# Patient Record
Sex: FEMALE | Race: OTHER | HISPANIC OR LATINO | Employment: FULL TIME | URBAN - METROPOLITAN AREA
[De-identification: names, ages, dates, MRNs, and addresses within clinical notes are randomized per-mention and may not be internally consistent; named-entity substitution may affect disease eponyms.]

---

## 2017-01-06 ENCOUNTER — ALLSCRIPTS OFFICE VISIT (OUTPATIENT)
Dept: OTHER | Facility: OTHER | Age: 42
End: 2017-01-06

## 2017-01-06 ENCOUNTER — TRANSCRIBE ORDERS (OUTPATIENT)
Dept: ADMINISTRATIVE | Facility: HOSPITAL | Age: 42
End: 2017-01-06

## 2017-01-06 DIAGNOSIS — R31.29 OTHER MICROSCOPIC HEMATURIA: ICD-10-CM

## 2017-01-06 DIAGNOSIS — E11.29 TYPE 2 DIABETES MELLITUS WITH OTHER DIABETIC KIDNEY COMPLICATION (CODE): ICD-10-CM

## 2017-01-06 DIAGNOSIS — R31.29 MICROSCOPIC HEMATURIA: Primary | ICD-10-CM

## 2017-01-06 DIAGNOSIS — E03.9 HYPOTHYROIDISM: ICD-10-CM

## 2017-01-06 DIAGNOSIS — R77.8 OTHER SPECIFIED ABNORMALITIES OF PLASMA PROTEINS: ICD-10-CM

## 2017-01-06 DIAGNOSIS — R74.8 ABNORMAL LEVELS OF OTHER SERUM ENZYMES: ICD-10-CM

## 2017-01-06 LAB
BILIRUB UR QL STRIP: NORMAL
CLARITY UR: NORMAL
COLOR UR: YELLOW
GLUCOSE (HISTORICAL): NORMAL
HGB UR QL STRIP.AUTO: NORMAL
KETONES UR STRIP-MCNC: NORMAL MG/DL
LEUKOCYTE ESTERASE UR QL STRIP: NORMAL
NITRITE UR QL STRIP: NORMAL
PH UR STRIP.AUTO: 6 [PH]
PROT UR STRIP-MCNC: NORMAL MG/DL
SP GR UR STRIP.AUTO: 1.01

## 2017-01-09 ENCOUNTER — APPOINTMENT (OUTPATIENT)
Dept: LAB | Facility: HOSPITAL | Age: 42
End: 2017-01-09
Attending: UROLOGY
Payer: COMMERCIAL

## 2017-01-09 DIAGNOSIS — R31.29 OTHER MICROSCOPIC HEMATURIA: ICD-10-CM

## 2017-01-09 PROCEDURE — 87086 URINE CULTURE/COLONY COUNT: CPT

## 2017-01-10 LAB — BACTERIA UR CULT: NORMAL

## 2017-01-12 ENCOUNTER — APPOINTMENT (OUTPATIENT)
Dept: LAB | Facility: CLINIC | Age: 42
End: 2017-01-12
Payer: COMMERCIAL

## 2017-01-12 ENCOUNTER — TRANSCRIBE ORDERS (OUTPATIENT)
Dept: LAB | Facility: CLINIC | Age: 42
End: 2017-01-12

## 2017-01-12 DIAGNOSIS — E66.09 OTHER OBESITY DUE TO EXCESS CALORIES: ICD-10-CM

## 2017-01-12 DIAGNOSIS — Z79.4 ENCOUNTER FOR LONG-TERM (CURRENT) USE OF INSULIN (HCC): ICD-10-CM

## 2017-01-12 DIAGNOSIS — E78.2 MIXED HYPERLIPIDEMIA: ICD-10-CM

## 2017-01-12 DIAGNOSIS — E55.9 UNSPECIFIED VITAMIN D DEFICIENCY: ICD-10-CM

## 2017-01-12 DIAGNOSIS — E03.9 UNSPECIFIED HYPOTHYROIDISM: ICD-10-CM

## 2017-01-12 DIAGNOSIS — E11.649 DIABETIC HYPOGLYCEMIA (HCC): ICD-10-CM

## 2017-01-12 DIAGNOSIS — E11.649 DIABETIC HYPOGLYCEMIA (HCC): Primary | ICD-10-CM

## 2017-01-12 LAB
25(OH)D3 SERPL-MCNC: 29.2 NG/ML (ref 30–100)
ALBUMIN SERPL BCP-MCNC: 3.9 G/DL (ref 3.5–5)
ALP SERPL-CCNC: 120 U/L (ref 46–116)
ALT SERPL W P-5'-P-CCNC: 42 U/L (ref 12–78)
ANION GAP SERPL CALCULATED.3IONS-SCNC: 7 MMOL/L (ref 4–13)
AST SERPL W P-5'-P-CCNC: 11 U/L (ref 5–45)
BILIRUB SERPL-MCNC: 0.44 MG/DL (ref 0.2–1)
BUN SERPL-MCNC: 13 MG/DL (ref 5–25)
CALCIUM SERPL-MCNC: 9.9 MG/DL (ref 8.3–10.1)
CHLORIDE SERPL-SCNC: 101 MMOL/L (ref 100–108)
CO2 SERPL-SCNC: 27 MMOL/L (ref 21–32)
CREAT SERPL-MCNC: 0.62 MG/DL (ref 0.6–1.3)
EST. AVERAGE GLUCOSE BLD GHB EST-MCNC: 120 MG/DL
GFR SERPL CREATININE-BSD FRML MDRD: >60 ML/MIN/1.73SQ M
GLUCOSE SERPL-MCNC: 107 MG/DL (ref 65–140)
HBA1C MFR BLD: 5.8 % (ref 4.2–6.3)
POTASSIUM SERPL-SCNC: 4 MMOL/L (ref 3.5–5.3)
PROT SERPL-MCNC: 8.1 G/DL (ref 6.4–8.2)
SODIUM SERPL-SCNC: 135 MMOL/L (ref 136–145)

## 2017-01-12 PROCEDURE — 82306 VITAMIN D 25 HYDROXY: CPT

## 2017-01-12 PROCEDURE — 83036 HEMOGLOBIN GLYCOSYLATED A1C: CPT

## 2017-01-12 PROCEDURE — 36415 COLL VENOUS BLD VENIPUNCTURE: CPT

## 2017-01-12 PROCEDURE — 80053 COMPREHEN METABOLIC PANEL: CPT

## 2017-01-17 ENCOUNTER — GENERIC CONVERSION - ENCOUNTER (OUTPATIENT)
Dept: OTHER | Facility: OTHER | Age: 42
End: 2017-01-17

## 2017-01-24 ENCOUNTER — ALLSCRIPTS OFFICE VISIT (OUTPATIENT)
Dept: OTHER | Facility: OTHER | Age: 42
End: 2017-01-24

## 2017-01-27 ENCOUNTER — ALLSCRIPTS OFFICE VISIT (OUTPATIENT)
Dept: OTHER | Facility: OTHER | Age: 42
End: 2017-01-27

## 2017-02-23 ENCOUNTER — HOSPITAL ENCOUNTER (OUTPATIENT)
Dept: CT IMAGING | Facility: CLINIC | Age: 42
Discharge: HOME | End: 2017-02-23
Payer: COMMERCIAL

## 2017-02-23 DIAGNOSIS — R31.29 OTHER MICROSCOPIC HEMATURIA: ICD-10-CM

## 2017-02-23 DIAGNOSIS — R31.29 MICROSCOPIC HEMATURIA: ICD-10-CM

## 2017-02-23 PROCEDURE — 74178 CT ABD&PLV WO CNTR FLWD CNTR: CPT

## 2017-02-23 RX ADMIN — IOHEXOL 120 ML: 350 INJECTION, SOLUTION INTRAVENOUS at 10:43

## 2017-03-09 ENCOUNTER — APPOINTMENT (OUTPATIENT)
Dept: LAB | Facility: HOSPITAL | Age: 42
End: 2017-03-09
Attending: UROLOGY
Payer: COMMERCIAL

## 2017-03-09 ENCOUNTER — ALLSCRIPTS OFFICE VISIT (OUTPATIENT)
Dept: OTHER | Facility: OTHER | Age: 42
End: 2017-03-09

## 2017-03-09 PROCEDURE — 88112 CYTOPATH CELL ENHANCE TECH: CPT

## 2017-03-09 PROCEDURE — 87086 URINE CULTURE/COLONY COUNT: CPT | Performed by: UROLOGY

## 2017-03-10 ENCOUNTER — LAB REQUISITION (OUTPATIENT)
Dept: LAB | Facility: HOSPITAL | Age: 42
End: 2017-03-10
Payer: COMMERCIAL

## 2017-03-10 ENCOUNTER — TRANSCRIBE ORDERS (OUTPATIENT)
Dept: LAB | Facility: HOSPITAL | Age: 42
End: 2017-03-10

## 2017-03-10 DIAGNOSIS — R31.9 HEMATURIA SYNDROME: Primary | ICD-10-CM

## 2017-03-10 DIAGNOSIS — R31.9 HEMATURIA: ICD-10-CM

## 2017-03-10 DIAGNOSIS — N32.81 OVERACTIVE BLADDER: ICD-10-CM

## 2017-03-11 LAB — BACTERIA UR CULT: NORMAL

## 2017-03-16 ENCOUNTER — APPOINTMENT (OUTPATIENT)
Dept: LAB | Facility: HOSPITAL | Age: 42
End: 2017-03-16
Attending: UROLOGY
Payer: COMMERCIAL

## 2017-03-16 ENCOUNTER — TRANSCRIBE ORDERS (OUTPATIENT)
Dept: ADMINISTRATIVE | Facility: HOSPITAL | Age: 42
End: 2017-03-16

## 2017-03-16 DIAGNOSIS — Z79.899 ENCOUNTER FOR LONG-TERM (CURRENT) USE OF OTHER MEDICATIONS: ICD-10-CM

## 2017-03-16 DIAGNOSIS — E78.2 MIXED HYPERLIPIDEMIA: ICD-10-CM

## 2017-03-16 DIAGNOSIS — N32.81 OVERACTIVE BLADDER: ICD-10-CM

## 2017-03-16 DIAGNOSIS — E03.9 UNSPECIFIED HYPOTHYROIDISM: Primary | ICD-10-CM

## 2017-03-16 LAB
ALBUMIN SERPL BCP-MCNC: 4.3 G/DL (ref 3.5–5)
ANION GAP SERPL CALCULATED.3IONS-SCNC: 10 MMOL/L (ref 4–13)
APTT PPP: 31 SECONDS (ref 24–36)
BASOPHILS # BLD AUTO: 0.07 THOUSANDS/ΜL (ref 0–0.1)
BASOPHILS NFR BLD AUTO: 1 % (ref 0–1)
BUN SERPL-MCNC: 18 MG/DL (ref 5–25)
CALCIUM ALBUM COR SERPL-MCNC: 10.2 MG/DL (ref 8.3–10.1)
CALCIUM SERPL-MCNC: 10.4 MD/DL (ref 8.3–10.1)
CALCIUM SERPL-MCNC: 10.4 MG/DL (ref 8.3–10.1)
CHLORIDE SERPL-SCNC: 99 MMOL/L (ref 100–108)
CO2 SERPL-SCNC: 25 MMOL/L (ref 21–32)
CREAT SERPL-MCNC: 0.7 MG/DL (ref 0.6–1.3)
EOSINOPHIL # BLD AUTO: 0.18 THOUSAND/ΜL (ref 0–0.61)
EOSINOPHIL NFR BLD AUTO: 2 % (ref 0–6)
ERYTHROCYTE [DISTWIDTH] IN BLOOD BY AUTOMATED COUNT: 13.2 % (ref 11.6–15.1)
GFR SERPL CREATININE-BSD FRML MDRD: >60 ML/MIN/1.73SQ M
GLUCOSE P FAST SERPL-MCNC: 110 MG/DL (ref 65–99)
HCT VFR BLD AUTO: 47.1 % (ref 34.8–46.1)
HGB BLD-MCNC: 15.5 G/DL (ref 11.5–15.4)
INR PPP: 1 (ref 0.86–1.16)
LYMPHOCYTES # BLD AUTO: 2.22 THOUSANDS/ΜL (ref 0.6–4.47)
LYMPHOCYTES NFR BLD AUTO: 24 % (ref 14–44)
MCH RBC QN AUTO: 31.3 PG (ref 26.8–34.3)
MCHC RBC AUTO-ENTMCNC: 32.9 G/DL (ref 31.4–37.4)
MCV RBC AUTO: 95 FL (ref 82–98)
MONOCYTES # BLD AUTO: 0.53 THOUSAND/ΜL (ref 0.17–1.22)
MONOCYTES NFR BLD AUTO: 6 % (ref 4–12)
NEUTROPHILS # BLD AUTO: 6.37 THOUSANDS/ΜL (ref 1.85–7.62)
NEUTS SEG NFR BLD AUTO: 68 % (ref 43–75)
NRBC BLD AUTO-RTO: 0 /100 WBCS
PLATELET # BLD AUTO: 365 THOUSANDS/UL (ref 149–390)
PMV BLD AUTO: 10.6 FL (ref 8.9–12.7)
POTASSIUM SERPL-SCNC: 4.6 MMOL/L (ref 3.5–5.3)
PROTHROMBIN TIME: 13.1 SECONDS (ref 12–14.3)
RBC # BLD AUTO: 4.95 MILLION/UL (ref 3.81–5.12)
SODIUM SERPL-SCNC: 134 MMOL/L (ref 136–145)
WBC # BLD AUTO: 9.41 THOUSAND/UL (ref 4.31–10.16)

## 2017-03-16 PROCEDURE — 85610 PROTHROMBIN TIME: CPT

## 2017-03-16 PROCEDURE — 85025 COMPLETE CBC W/AUTO DIFF WBC: CPT

## 2017-03-16 PROCEDURE — 85730 THROMBOPLASTIN TIME PARTIAL: CPT

## 2017-03-16 PROCEDURE — 82040 ASSAY OF SERUM ALBUMIN: CPT

## 2017-03-16 PROCEDURE — 80048 BASIC METABOLIC PNL TOTAL CA: CPT

## 2017-03-16 PROCEDURE — 36415 COLL VENOUS BLD VENIPUNCTURE: CPT

## 2017-03-17 RX ORDER — ATORVASTATIN CALCIUM 10 MG/1
10 TABLET, FILM COATED ORAL DAILY
COMMUNITY
End: 2018-02-28 | Stop reason: SDUPTHER

## 2017-03-17 RX ORDER — LISINOPRIL 2.5 MG/1
2.5 TABLET ORAL DAILY
COMMUNITY
End: 2018-07-17 | Stop reason: SDUPTHER

## 2017-03-17 RX ORDER — GLIMEPIRIDE 2 MG/1
1 TABLET ORAL
COMMUNITY
End: 2018-02-23 | Stop reason: ALTCHOICE

## 2017-03-17 RX ORDER — METFORMIN HYDROCHLORIDE 750 MG/1
750 TABLET, EXTENDED RELEASE ORAL
COMMUNITY
End: 2018-02-28 | Stop reason: SDUPTHER

## 2017-03-17 RX ORDER — INSULIN GLARGINE 100 [IU]/ML
20 INJECTION, SOLUTION SUBCUTANEOUS DAILY
COMMUNITY
End: 2018-02-23 | Stop reason: ALTCHOICE

## 2017-03-17 RX ORDER — LEVOTHYROXINE SODIUM 0.07 MG/1
75 TABLET ORAL DAILY
COMMUNITY
End: 2021-02-23 | Stop reason: SDUPTHER

## 2017-03-17 RX ORDER — ALBUTEROL SULFATE 90 UG/1
2 AEROSOL, METERED RESPIRATORY (INHALATION) EVERY 6 HOURS PRN
COMMUNITY
End: 2020-01-13 | Stop reason: SDUPTHER

## 2017-03-23 ENCOUNTER — ANESTHESIA (OUTPATIENT)
Dept: PERIOP | Facility: HOSPITAL | Age: 42
End: 2017-03-23
Payer: COMMERCIAL

## 2017-03-23 ENCOUNTER — HOSPITAL ENCOUNTER (OUTPATIENT)
Facility: HOSPITAL | Age: 42
Setting detail: OUTPATIENT SURGERY
Discharge: HOME | End: 2017-03-23
Attending: UROLOGY | Admitting: UROLOGY
Payer: COMMERCIAL

## 2017-03-23 ENCOUNTER — ANESTHESIA EVENT (OUTPATIENT)
Dept: PERIOP | Facility: HOSPITAL | Age: 42
End: 2017-03-23
Payer: COMMERCIAL

## 2017-03-23 VITALS
HEART RATE: 64 BPM | SYSTOLIC BLOOD PRESSURE: 123 MMHG | HEIGHT: 63 IN | DIASTOLIC BLOOD PRESSURE: 72 MMHG | RESPIRATION RATE: 18 BRPM | OXYGEN SATURATION: 100 % | BODY MASS INDEX: 32.34 KG/M2 | WEIGHT: 182.5 LBS | TEMPERATURE: 96.7 F

## 2017-03-23 DIAGNOSIS — N32.89 BLADDER MASS: ICD-10-CM

## 2017-03-23 LAB
GLUCOSE SERPL-MCNC: 127 MG/DL (ref 65–140)
GLUCOSE SERPL-MCNC: 156 MG/DL (ref 65–140)

## 2017-03-23 PROCEDURE — 88305 TISSUE EXAM BY PATHOLOGIST: CPT | Performed by: UROLOGY

## 2017-03-23 PROCEDURE — C1769 GUIDE WIRE: HCPCS | Performed by: UROLOGY

## 2017-03-23 PROCEDURE — 82948 REAGENT STRIP/BLOOD GLUCOSE: CPT

## 2017-03-23 RX ORDER — FENTANYL CITRATE 50 UG/ML
INJECTION, SOLUTION INTRAMUSCULAR; INTRAVENOUS AS NEEDED
Status: DISCONTINUED | OUTPATIENT
Start: 2017-03-23 | End: 2017-03-23 | Stop reason: SURG

## 2017-03-23 RX ORDER — ONDANSETRON 2 MG/ML
INJECTION INTRAMUSCULAR; INTRAVENOUS AS NEEDED
Status: DISCONTINUED | OUTPATIENT
Start: 2017-03-23 | End: 2017-03-23 | Stop reason: SURG

## 2017-03-23 RX ORDER — MIDAZOLAM HYDROCHLORIDE 1 MG/ML
INJECTION INTRAMUSCULAR; INTRAVENOUS AS NEEDED
Status: DISCONTINUED | OUTPATIENT
Start: 2017-03-23 | End: 2017-03-23 | Stop reason: SURG

## 2017-03-23 RX ORDER — ONDANSETRON 2 MG/ML
4 INJECTION INTRAMUSCULAR; INTRAVENOUS ONCE AS NEEDED
Status: DISCONTINUED | OUTPATIENT
Start: 2017-03-23 | End: 2017-03-23 | Stop reason: HOSPADM

## 2017-03-23 RX ORDER — GLYCOPYRROLATE 0.2 MG/ML
INJECTION INTRAMUSCULAR; INTRAVENOUS AS NEEDED
Status: DISCONTINUED | OUTPATIENT
Start: 2017-03-23 | End: 2017-03-23 | Stop reason: SURG

## 2017-03-23 RX ORDER — LIDOCAINE HYDROCHLORIDE 10 MG/ML
INJECTION, SOLUTION INFILTRATION; PERINEURAL AS NEEDED
Status: DISCONTINUED | OUTPATIENT
Start: 2017-03-23 | End: 2017-03-23 | Stop reason: SURG

## 2017-03-23 RX ORDER — FENTANYL CITRATE/PF 50 MCG/ML
25 SYRINGE (ML) INJECTION
Status: DISCONTINUED | OUTPATIENT
Start: 2017-03-23 | End: 2017-03-23 | Stop reason: HOSPADM

## 2017-03-23 RX ORDER — SCOLOPAMINE TRANSDERMAL SYSTEM 1 MG/1
PATCH, EXTENDED RELEASE TRANSDERMAL
Status: DISCONTINUED
Start: 2017-03-23 | End: 2017-03-23 | Stop reason: HOSPADM

## 2017-03-23 RX ORDER — SODIUM CHLORIDE 9 MG/ML
50 INJECTION, SOLUTION INTRAVENOUS CONTINUOUS
Status: DISCONTINUED | OUTPATIENT
Start: 2017-03-23 | End: 2017-03-23 | Stop reason: HOSPADM

## 2017-03-23 RX ORDER — DOCUSATE SODIUM 100 MG/1
100 CAPSULE, LIQUID FILLED ORAL 2 TIMES DAILY
Qty: 30 CAPSULE | Refills: 0 | Status: SHIPPED | OUTPATIENT
Start: 2017-03-23 | End: 2018-02-23 | Stop reason: ALTCHOICE

## 2017-03-23 RX ORDER — PROPOFOL 10 MG/ML
INJECTION, EMULSION INTRAVENOUS AS NEEDED
Status: DISCONTINUED | OUTPATIENT
Start: 2017-03-23 | End: 2017-03-23 | Stop reason: SURG

## 2017-03-23 RX ORDER — CIPROFLOXACIN 2 MG/ML
400 INJECTION, SOLUTION INTRAVENOUS ONCE
Status: COMPLETED | OUTPATIENT
Start: 2017-03-23 | End: 2017-03-23

## 2017-03-23 RX ORDER — MAGNESIUM HYDROXIDE 1200 MG/15ML
LIQUID ORAL AS NEEDED
Status: DISCONTINUED | OUTPATIENT
Start: 2017-03-23 | End: 2017-03-23 | Stop reason: HOSPADM

## 2017-03-23 RX ORDER — HYDROCODONE BITARTRATE AND ACETAMINOPHEN 5; 325 MG/1; MG/1
1 TABLET ORAL EVERY 6 HOURS PRN
Qty: 5 TABLET | Refills: 0 | Status: SHIPPED | OUTPATIENT
Start: 2017-03-23 | End: 2017-04-02

## 2017-03-23 RX ORDER — SODIUM CHLORIDE 9 MG/ML
INJECTION, SOLUTION INTRAVENOUS CONTINUOUS PRN
Status: DISCONTINUED | OUTPATIENT
Start: 2017-03-23 | End: 2017-03-23 | Stop reason: SURG

## 2017-03-23 RX ORDER — PHENAZOPYRIDINE HYDROCHLORIDE 200 MG/1
200 TABLET, FILM COATED ORAL 3 TIMES DAILY PRN
Qty: 10 TABLET | Refills: 0 | Status: SHIPPED | OUTPATIENT
Start: 2017-03-23 | End: 2017-03-26

## 2017-03-23 RX ADMIN — ONDANSETRON 4 MG: 2 INJECTION INTRAMUSCULAR; INTRAVENOUS at 09:22

## 2017-03-23 RX ADMIN — MIDAZOLAM HYDROCHLORIDE 2 MG: 1 INJECTION, SOLUTION INTRAMUSCULAR; INTRAVENOUS at 09:20

## 2017-03-23 RX ADMIN — DEXAMETHASONE SODIUM PHOSPHATE 10 MG: 10 INJECTION INTRAMUSCULAR; INTRAVENOUS at 09:29

## 2017-03-23 RX ADMIN — SODIUM CHLORIDE: 0.9 INJECTION, SOLUTION INTRAVENOUS at 09:20

## 2017-03-23 RX ADMIN — GLYCOPYRROLATE 0.1 MG: 0.2 INJECTION, SOLUTION INTRAMUSCULAR; INTRAVENOUS at 09:22

## 2017-03-23 RX ADMIN — FENTANYL CITRATE 50 MCG: 50 INJECTION, SOLUTION INTRAMUSCULAR; INTRAVENOUS at 09:23

## 2017-03-23 RX ADMIN — FENTANYL CITRATE 50 MCG: 50 INJECTION, SOLUTION INTRAMUSCULAR; INTRAVENOUS at 09:35

## 2017-03-23 RX ADMIN — LIDOCAINE HYDROCHLORIDE 50 MG: 10 INJECTION, SOLUTION INFILTRATION; PERINEURAL at 09:23

## 2017-03-23 RX ADMIN — PROPOFOL 200 MG: 10 INJECTION, EMULSION INTRAVENOUS at 09:23

## 2017-03-23 RX ADMIN — CIPROFLOXACIN: 2 INJECTION INTRAVENOUS at 09:25

## 2017-04-04 ENCOUNTER — ALLSCRIPTS OFFICE VISIT (OUTPATIENT)
Dept: OTHER | Facility: OTHER | Age: 42
End: 2017-04-04

## 2017-04-06 ENCOUNTER — ALLSCRIPTS OFFICE VISIT (OUTPATIENT)
Dept: OTHER | Facility: OTHER | Age: 42
End: 2017-04-06

## 2017-04-13 ENCOUNTER — APPOINTMENT (OUTPATIENT)
Dept: LAB | Facility: CLINIC | Age: 42
End: 2017-04-13
Payer: COMMERCIAL

## 2017-04-13 DIAGNOSIS — R55 SYNCOPE AND COLLAPSE: ICD-10-CM

## 2017-04-13 DIAGNOSIS — E11.9 TYPE 2 DIABETES MELLITUS WITHOUT COMPLICATIONS (HCC): ICD-10-CM

## 2017-04-13 DIAGNOSIS — E03.9 HYPOTHYROIDISM: ICD-10-CM

## 2017-04-13 DIAGNOSIS — R31.29 OTHER MICROSCOPIC HEMATURIA: ICD-10-CM

## 2017-04-13 DIAGNOSIS — E16.2 HYPOGLYCEMIA: ICD-10-CM

## 2017-04-13 DIAGNOSIS — R31.9 HEMATURIA: ICD-10-CM

## 2017-04-13 DIAGNOSIS — R74.01 NONSPECIFIC ELEVATION OF LEVELS OF TRANSAMINASE AND LACTIC ACID DEHYDROGENASE (LDH): ICD-10-CM

## 2017-04-13 DIAGNOSIS — E87.1 HYPO-OSMOLALITY AND HYPONATREMIA: ICD-10-CM

## 2017-04-13 DIAGNOSIS — R74.02 NONSPECIFIC ELEVATION OF LEVELS OF TRANSAMINASE AND LACTIC ACID DEHYDROGENASE (LDH): ICD-10-CM

## 2017-04-13 DIAGNOSIS — E03.9 UNSPECIFIED HYPOTHYROIDISM: ICD-10-CM

## 2017-04-13 DIAGNOSIS — E83.52 HYPERCALCEMIA: ICD-10-CM

## 2017-04-13 DIAGNOSIS — K76.0 FATTY (CHANGE OF) LIVER, NOT ELSEWHERE CLASSIFIED: ICD-10-CM

## 2017-04-13 DIAGNOSIS — J45.20 MILD INTERMITTENT ASTHMA, UNCOMPLICATED: ICD-10-CM

## 2017-04-13 DIAGNOSIS — E78.2 MIXED HYPERLIPIDEMIA: ICD-10-CM

## 2017-04-13 DIAGNOSIS — Z79.899 ENCOUNTER FOR LONG-TERM (CURRENT) USE OF OTHER MEDICATIONS: ICD-10-CM

## 2017-04-13 LAB
25(OH)D3 SERPL-MCNC: 34.8 NG/ML (ref 30–100)
ALBUMIN SERPL BCP-MCNC: 4 G/DL (ref 3.5–5)
ALP SERPL-CCNC: 112 U/L (ref 46–116)
ALT SERPL W P-5'-P-CCNC: 49 U/L (ref 12–78)
ANION GAP SERPL CALCULATED.3IONS-SCNC: 8 MMOL/L (ref 4–13)
AST SERPL W P-5'-P-CCNC: 15 U/L (ref 5–45)
BASOPHILS # BLD AUTO: 0.03 THOUSANDS/ΜL (ref 0–0.1)
BASOPHILS NFR BLD AUTO: 0 % (ref 0–1)
BILIRUB SERPL-MCNC: 0.29 MG/DL (ref 0.2–1)
BUN SERPL-MCNC: 11 MG/DL (ref 5–25)
CA-I BLD-SCNC: 1.27 MMOL/L (ref 1.12–1.32)
CALCIUM SERPL-MCNC: 9.5 MG/DL (ref 8.3–10.1)
CHLORIDE SERPL-SCNC: 100 MMOL/L (ref 100–108)
CHOLEST SERPL-MCNC: 143 MG/DL (ref 50–200)
CO2 SERPL-SCNC: 26 MMOL/L (ref 21–32)
CREAT SERPL-MCNC: 0.61 MG/DL (ref 0.6–1.3)
EOSINOPHIL # BLD AUTO: 0.16 THOUSAND/ΜL (ref 0–0.61)
EOSINOPHIL NFR BLD AUTO: 2 % (ref 0–6)
ERYTHROCYTE [DISTWIDTH] IN BLOOD BY AUTOMATED COUNT: 13.3 % (ref 11.6–15.1)
EST. AVERAGE GLUCOSE BLD GHB EST-MCNC: 126 MG/DL
FERRITIN SERPL-MCNC: 50 NG/ML (ref 8–388)
GFR SERPL CREATININE-BSD FRML MDRD: >60 ML/MIN/1.73SQ M
GLUCOSE P FAST SERPL-MCNC: 107 MG/DL (ref 65–99)
HBA1C MFR BLD: 6 % (ref 4.2–6.3)
HCT VFR BLD AUTO: 43.6 % (ref 34.8–46.1)
HDLC SERPL-MCNC: 40 MG/DL (ref 40–60)
HGB BLD-MCNC: 14.6 G/DL (ref 11.5–15.4)
IRON SATN MFR SERPL: 17 %
IRON SERPL-MCNC: 57 UG/DL (ref 50–170)
LDLC SERPL CALC-MCNC: 86 MG/DL (ref 0–100)
LDLC SERPL DIRECT ASSAY-MCNC: 92 MG/DL (ref 0–100)
LYMPHOCYTES # BLD AUTO: 2.17 THOUSANDS/ΜL (ref 0.6–4.47)
LYMPHOCYTES NFR BLD AUTO: 27 % (ref 14–44)
MCH RBC QN AUTO: 31.9 PG (ref 26.8–34.3)
MCHC RBC AUTO-ENTMCNC: 33.5 G/DL (ref 31.4–37.4)
MCV RBC AUTO: 95 FL (ref 82–98)
MONOCYTES # BLD AUTO: 0.45 THOUSAND/ΜL (ref 0.17–1.22)
MONOCYTES NFR BLD AUTO: 6 % (ref 4–12)
NEUTROPHILS # BLD AUTO: 5.28 THOUSANDS/ΜL (ref 1.85–7.62)
NEUTS SEG NFR BLD AUTO: 65 % (ref 43–75)
NRBC BLD AUTO-RTO: 0 /100 WBCS
PLATELET # BLD AUTO: 356 THOUSANDS/UL (ref 149–390)
PMV BLD AUTO: 11.5 FL (ref 8.9–12.7)
POTASSIUM SERPL-SCNC: 4 MMOL/L (ref 3.5–5.3)
PROT SERPL-MCNC: 8.1 G/DL (ref 6.4–8.2)
PTH-INTACT SERPL-MCNC: 50.3 PG/ML (ref 14–72)
RBC # BLD AUTO: 4.58 MILLION/UL (ref 3.81–5.12)
SODIUM SERPL-SCNC: 134 MMOL/L (ref 136–145)
T4 FREE SERPL-MCNC: 1.02 NG/DL (ref 0.76–1.46)
TIBC SERPL-MCNC: 332 UG/DL (ref 250–450)
TRIGL SERPL-MCNC: 84 MG/DL
TSH SERPL DL<=0.05 MIU/L-ACNC: 1.02 UIU/ML (ref 0.36–3.74)
WBC # BLD AUTO: 8.11 THOUSAND/UL (ref 4.31–10.16)

## 2017-04-13 PROCEDURE — 82330 ASSAY OF CALCIUM: CPT

## 2017-04-13 PROCEDURE — 84443 ASSAY THYROID STIM HORMONE: CPT

## 2017-04-13 PROCEDURE — 87340 HEPATITIS B SURFACE AG IA: CPT

## 2017-04-13 PROCEDURE — 86803 HEPATITIS C AB TEST: CPT

## 2017-04-13 PROCEDURE — 82390 ASSAY OF CERULOPLASMIN: CPT

## 2017-04-13 PROCEDURE — 83721 ASSAY OF BLOOD LIPOPROTEIN: CPT

## 2017-04-13 PROCEDURE — 85025 COMPLETE CBC W/AUTO DIFF WBC: CPT

## 2017-04-13 PROCEDURE — 82306 VITAMIN D 25 HYDROXY: CPT

## 2017-04-13 PROCEDURE — 83550 IRON BINDING TEST: CPT

## 2017-04-13 PROCEDURE — 83970 ASSAY OF PARATHORMONE: CPT

## 2017-04-13 PROCEDURE — 83036 HEMOGLOBIN GLYCOSYLATED A1C: CPT

## 2017-04-13 PROCEDURE — 86706 HEP B SURFACE ANTIBODY: CPT

## 2017-04-13 PROCEDURE — 36415 COLL VENOUS BLD VENIPUNCTURE: CPT

## 2017-04-13 PROCEDURE — 82103 ALPHA-1-ANTITRYPSIN TOTAL: CPT

## 2017-04-13 PROCEDURE — 80061 LIPID PANEL: CPT

## 2017-04-13 PROCEDURE — 86235 NUCLEAR ANTIGEN ANTIBODY: CPT

## 2017-04-13 PROCEDURE — 80053 COMPREHEN METABOLIC PANEL: CPT

## 2017-04-13 PROCEDURE — 86256 FLUORESCENT ANTIBODY TITER: CPT

## 2017-04-13 PROCEDURE — 86704 HEP B CORE ANTIBODY TOTAL: CPT

## 2017-04-13 PROCEDURE — 83540 ASSAY OF IRON: CPT

## 2017-04-13 PROCEDURE — 82728 ASSAY OF FERRITIN: CPT

## 2017-04-13 PROCEDURE — 84439 ASSAY OF FREE THYROXINE: CPT

## 2017-04-14 LAB
A1AT SERPL-MCNC: 121 MG/DL (ref 90–200)
ACTIN IGG SERPL-ACNC: 37 UNITS (ref 0–19)
CERULOPLASMIN SERPL-MCNC: 27.7 MG/DL (ref 19–39)
HBV CORE AB SER QL: NORMAL
HBV SURFACE AB SER-ACNC: <3.1 MIU/ML
HBV SURFACE AG SER QL: NORMAL
HCV AB SER QL: NORMAL
MITOCHONDRIA M2 IGG SER-ACNC: 5.8 UNITS (ref 0–20)

## 2017-04-19 DIAGNOSIS — R74.8 ABNORMAL LEVELS OF OTHER SERUM ENZYMES: ICD-10-CM

## 2017-04-19 DIAGNOSIS — R80.9 PROTEINURIA: ICD-10-CM

## 2017-04-27 ENCOUNTER — ALLSCRIPTS OFFICE VISIT (OUTPATIENT)
Dept: OTHER | Facility: OTHER | Age: 42
End: 2017-04-27

## 2017-06-16 ENCOUNTER — GENERIC CONVERSION - ENCOUNTER (OUTPATIENT)
Dept: OTHER | Facility: OTHER | Age: 42
End: 2017-06-16

## 2017-08-30 ENCOUNTER — TRANSCRIBE ORDERS (OUTPATIENT)
Dept: LAB | Facility: CLINIC | Age: 42
End: 2017-08-30

## 2017-08-30 ENCOUNTER — APPOINTMENT (OUTPATIENT)
Dept: LAB | Facility: CLINIC | Age: 42
End: 2017-08-30
Payer: COMMERCIAL

## 2017-08-30 DIAGNOSIS — K81.1 CHRONIC CHOLECYSTITIS: ICD-10-CM

## 2017-08-30 DIAGNOSIS — K81.1 CHRONIC CHOLECYSTITIS: Primary | ICD-10-CM

## 2017-08-30 LAB
BASOPHILS # BLD AUTO: 0.05 THOUSANDS/ΜL (ref 0–0.1)
BASOPHILS NFR BLD AUTO: 1 % (ref 0–1)
EOSINOPHIL # BLD AUTO: 0.43 THOUSAND/ΜL (ref 0–0.61)
EOSINOPHIL NFR BLD AUTO: 5 % (ref 0–6)
ERYTHROCYTE [DISTWIDTH] IN BLOOD BY AUTOMATED COUNT: 13.6 % (ref 11.6–15.1)
HCT VFR BLD AUTO: 41.5 % (ref 34.8–46.1)
HGB BLD-MCNC: 14 G/DL (ref 11.5–15.4)
LYMPHOCYTES # BLD AUTO: 2.58 THOUSANDS/ΜL (ref 0.6–4.47)
LYMPHOCYTES NFR BLD AUTO: 27 % (ref 14–44)
MCH RBC QN AUTO: 32 PG (ref 26.8–34.3)
MCHC RBC AUTO-ENTMCNC: 33.7 G/DL (ref 31.4–37.4)
MCV RBC AUTO: 95 FL (ref 82–98)
MONOCYTES # BLD AUTO: 0.64 THOUSAND/ΜL (ref 0.17–1.22)
MONOCYTES NFR BLD AUTO: 7 % (ref 4–12)
NEUTROPHILS # BLD AUTO: 5.75 THOUSANDS/ΜL (ref 1.85–7.62)
NEUTS SEG NFR BLD AUTO: 60 % (ref 43–75)
NRBC BLD AUTO-RTO: 0 /100 WBCS
PLATELET # BLD AUTO: 346 THOUSANDS/UL (ref 149–390)
PMV BLD AUTO: 11.3 FL (ref 8.9–12.7)
RBC # BLD AUTO: 4.38 MILLION/UL (ref 3.81–5.12)
WBC # BLD AUTO: 9.49 THOUSAND/UL (ref 4.31–10.16)

## 2017-08-30 PROCEDURE — 36415 COLL VENOUS BLD VENIPUNCTURE: CPT

## 2017-08-30 PROCEDURE — 85025 COMPLETE CBC W/AUTO DIFF WBC: CPT

## 2017-09-06 ENCOUNTER — GENERIC CONVERSION - ENCOUNTER (OUTPATIENT)
Dept: OTHER | Facility: OTHER | Age: 42
End: 2017-09-06

## 2017-10-16 ENCOUNTER — APPOINTMENT (OUTPATIENT)
Dept: LAB | Facility: CLINIC | Age: 42
End: 2017-10-16
Payer: COMMERCIAL

## 2017-10-16 DIAGNOSIS — R74.8 ABNORMAL LEVELS OF OTHER SERUM ENZYMES: ICD-10-CM

## 2017-10-16 LAB
ALBUMIN SERPL BCP-MCNC: 4.3 G/DL (ref 3.5–5)
ALP SERPL-CCNC: 108 U/L (ref 46–116)
ALT SERPL W P-5'-P-CCNC: 46 U/L (ref 12–78)
AST SERPL W P-5'-P-CCNC: 19 U/L (ref 5–45)
BILIRUB DIRECT SERPL-MCNC: 0.17 MG/DL (ref 0–0.2)
BILIRUB SERPL-MCNC: 0.68 MG/DL (ref 0.2–1)
GGT SERPL-CCNC: 94 U/L (ref 5–85)
PROT SERPL-MCNC: 8.6 G/DL (ref 6.4–8.2)

## 2017-10-16 PROCEDURE — 36415 COLL VENOUS BLD VENIPUNCTURE: CPT

## 2017-10-16 PROCEDURE — 84080 ASSAY ALKALINE PHOSPHATASES: CPT

## 2017-10-16 PROCEDURE — 86235 NUCLEAR ANTIGEN ANTIBODY: CPT

## 2017-10-16 PROCEDURE — 84075 ASSAY ALKALINE PHOSPHATASE: CPT

## 2017-10-16 PROCEDURE — 82977 ASSAY OF GGT: CPT

## 2017-10-16 PROCEDURE — 80076 HEPATIC FUNCTION PANEL: CPT

## 2017-10-17 ENCOUNTER — ALLSCRIPTS OFFICE VISIT (OUTPATIENT)
Dept: OTHER | Facility: OTHER | Age: 42
End: 2017-10-17

## 2017-10-17 LAB
ACTIN IGG SERPL-ACNC: 37 UNITS (ref 0–19)
HBA1C MFR BLD HPLC: 6.2 %

## 2017-10-18 LAB
ALP BONE CFR SERPL: 28 % (ref 14–68)
ALP INTEST CFR SERPL: 20 % (ref 0–18)
ALP LIVER CFR SERPL: 52 % (ref 18–85)
ALP SERPL-CCNC: 100 IU/L (ref 39–117)

## 2017-10-18 NOTE — PROGRESS NOTES
Assessment  1  Family history of alcoholism (V17 0) (Z81 1) : Father   2  Family history of substance abuse (V17 0) (Z81 4) : Cousin   3  Family history of Anxiety : Daughter, Mother   3  Family history of depression (V17 0) (Z81 8) : Child, Mother, Son, Maternal Aunt   5  Type 2 diabetes mellitus (250 00) (E11 9)   6  Hypothyroidism (244 9) (E03 9)   7  Vitamin D deficiency (268 9) (E55 9)   8  Diabetes mellitus with proteinuria (250 40,791 0) (E11 29,R80 9)   9  Elevated serum GGT level (790 5) (R74 8)   10  Elevated total protein (790 99) (R77 8)   11  History of Cholecystectomy Laparoscopic   12  Cystocele with uterine prolapse (618 4) (N81 4)   13  Rectocele (618 04) (N81 6)   14  Abnormal liver enzymes (790 5) (R74 8)   15  Hypoglycemia (251 2) (E16 2)   16  Obesity (278 00) (E66 9)    Plan  Abnormal liver enzymes    · (1) ALKALINE PHOSPHATASE ISOENZYMES; Status:Canceled;    · (1) SMOOTH MUSCLE ANTIBODY; Status:Canceled;   Diabetes mellitus with proteinuria, Elevated serum GGT level, Elevated total protein    · (1) CBC/PLT/DIFF; Status:Active; Requested QAM:02EFM1329;    · (1) COMPREHENSIVE METABOLIC PANEL; Status:Active; Requested KHQ:77KBK3412;    · (1) GGT; Status:Active; Requested PTQ:83ULM1448;    · (1) HEMOGLOBIN A1C; Status:Active; Requested PJK:54SWW9906;    · (1) MICROALBUMIN CREATININE RATIO, RANDOM URINE; Status:Active; Requested WXR:43TSW2093;   Diabetes mellitus with proteinuria, Microscopic hematuria    · (1) LIPID PANEL FASTING W DIRECT LDL REFLEX; Status:Active;  Requested ASK:32PTB1474;   Flu vaccine need    · Fluzone Quadrivalent Intramuscular Suspension  Hypoglycemia    · Begin a limited exercise program ; Status:Complete;   Done: 29QDP2988 11:02AM   · Cut your nails straight across ; Status:Complete;   Done: 84JCH5365 11:02AM   · If you have symptoms of being hypoglycemic or your blood sugar is less than 60, you need to eat or  drink a source of sugar ; Status:Complete;   Done: 71RTK7490 11: 02AM   · Inspect your feet and legs daily if you have vascular disease ; Status:Complete;   Done: 50LXY8476  11:02AM   · Inspect your feet daily ; Status:Complete;   Done: 27HGO7274 11:02AM   · It is important to take good care of your feet if you have diabetes ; Status:Complete;   Done:  52BNZ8272 11:02AM   · Limit your use of alcohol to 2 drinks or cans of beer a day ; Status:Complete;   Done: 89OXP9293  11:02AM   · Start eating more fiber ; Status:Complete;   Done: 62UEE7075 11:02AM   · There are many exercise options for seniors ; Status:Complete;   Done: 28RRN6393 11:02AM   · We recommend that you bring your body mass index down to 26 ; Status:Complete;   Done:  29CNS6162 11:02AM   · We recommend that you follow the Mediterranean diet ; Status:Complete;   Done: 40IJK0231  11:02AM   · Wear shoes that give your toes plenty of room ; Status:Complete;   Done: 66PGG2010 11:02AM   · Call [phone] if: Your blood sugar is steadily becoming higher ; Status:Complete;   Done:  64ATL4622 11:02AM   · Call [phone] if: Your blood sugar is still over 300 after taking insulin ; Status:Complete;    Done: 85IBL3735 11:02AM   · Call 911 if: There are symptoms of ketoacidosis  ; Status:Complete;   Done: 35NOV8981 11:02AM   · Call 911 if: You have a seizure ; Status:Complete;   Done: 32XNF8045 11:02AM   · Call 911 if: You have any symptoms of a stroke ; Status:Complete;   Done: 83DHE7729 11:02AM   · Call 911 if: You have fainted or passed out ; Status:Complete;   Done: 41RJA8494 11:02AM   · Seek Immediate Medical Attention if: There are signs that the blood sugar is too high  (hyperglycemia) ; Status:Complete;   Done: 09INN2281 11:02AM   · Seek Immediate Medical Attention if: You become dehydrated ; Status:Complete;   Done: 02ONB4661  11:02AM   · Seek Immediate Medical Attention if: You experience a new kind of chest pain (angina) or pressure ;  Status:Complete;   Done: 02MCA0529 11:02AM   · Seek Immediate Medical Attention if: You notice that breathing is rapid, more than 40 times a minute ;  Status:Complete;   Done: 08KBK0058 11:02AM   · Seek Immediate Medical Attention if: Your blood sugar is higher than 400 ; Status:Complete;   Done:  42IGC5587 11:02AM   · Seek Immediate Medical Attention if: Your eyesight becomes blurry or you have difficulty seeing ;  Status:Complete;   Done: 58ZUR4690 11:02AM  Hypothyroidism    · Levothyroxine Sodium 75 MCG Oral Tablet; take 1 tablet by mouth every day   · (1) T4, FREE; Status:Active; Requested ZNQ:99BIY9144;    · (1) TSH; Status:Active; Requested [id number];   Microscopic hematuria    · (1) URINALYSIS w URINE C/S REFLEX (will reflex a microscopy if leukocytes, occult blood, or  nitrites are not within normal limits); Status:Active; Requested LKP:56IYA2581;    · Follow-up visit in 4 Months Evaluation and Treatment  Follow-up  Status: Hold For - Scheduling   Requested for: 26XUL5550    Discussion/Summary  Discussion Summary:   1 patient's A1c has been under excellent control medications were recently decreased has had episodes of blood sugars under 70 patient was told to call her endocrinologist or myself with any blood sugars under 70 to expedite weaning her off on Lantus and other medicationsobesity patient is doing well with that diet and lifestyle is taking generic Topamax as well as for phentermine for the next few monthspatient with elevated GGTP hepatic enzymes were normal otherwise recent gallbladder surgery recheck in  E Nabil Ayala is at goal recheck in 4 monthspatient with symptomatic rectocele and cystocele does have appointment with Dr Kruger Keys will have surgical revisionhyperlipidemia check lipid panel on her next visit     Counseling Documentation With Imm: The patient was counseled regarding diagnostic results,-- instructions for management,-- risk factor reductions,-- prognosis,-- impressions,-- risks and benefits of treatment options,-- importance of compliance with treatment  total time of encounter was 40 minutes-- and-- 25 minutes was spent counseling  Chief Complaint  Chief Complaint Free Text Note Form: pt presents for f/u and review of labs  History of Present Illness  Hypoglycemia Concerns: The patient is being seen for a routine clinic follow-up of and Has not had a hypoglycemia episode in several months hypoglycemia concerns  The patient is currently asymptomatic  Hypothyroidism (Follow-Up): The patient is being seen for follow-up of hypothyroidism of undetermined etiology  The patient is currently asymptomatic  Associated symptoms: no myalgias,-- no arthralgias,-- no paresthesias,-- no depression,-- no leg swelling,-- no weight loss-- and-- no palpitations  Medications:  the patient is adherent to her medication regimen, but-- she denies medication side effects  Diabetes Type II (Follow-Up): The patient states she has been doing well with her Type II Diabetes control since the last visit  She has no known diabetic complications  Symptoms:   Vitamin D Deficiency: Disease type: vitamin D deficiency  Review of Systems  Complete-Female:   Constitutional: No fever, no chills, feels well, no tiredness, no recent weight gain or weight loss  Eyes: No complaints of eye pain, no red eyes, no eyesight problems, no discharge, no dry eyes, no itching of eyes  ENT: no complaints of earache, no loss of hearing, no nose bleeds, no nasal discharge, no sore throat, no hoarseness  Cardiovascular: No complaints of slow heart rate, no fast heart rate, no chest pain, no palpitations, no leg claudication, no lower extremity edema  Respiratory: No complaints of shortness of breath, no wheezing, no cough, no SOB on exertion, no orthopnea, no PND  Gastrointestinal: as noted in HPI  Genitourinary: as noted in HPI  Musculoskeletal: No complaints of arthralgias, no myalgias, no joint swelling or stiffness, no limb pain or swelling  Integumentary: No complaints of skin rash or lesions, no itching, no skin wounds, no breast pain or lump  Decreased distal   Neurological: as noted in HPI  Active Problems  1  Abnormal liver enzymes (790 5) (R74 8)   2  Abnormal mammogram (793 80) (R92 8)   3  Allergy to sulfa drugs (V14 2) (Z88 2)   4  Asthma, mild intermittent (493 90) (J45 20)   5  Benign essential hypertension (401 1) (I10)   6  Borderline abnormal thyroid function test (794 5) (R94 6)   7  Depression (311) (F32 9)   8  Diabetes (250 00) (E11 9)   9  Dizziness (780 4) (R42)   10  Dysplastic nevi (216 9) (D23 9)   11  Encounter for screening mammogram for malignant neoplasm of breast (V76 12) (Z12 31)   12  Flu vaccine need (V04 81) (Z23)   13  Frontal headache (784 0) (R51)   14  Headache (784 0) (R51)   15  Hematuria (599 70) (R31 9)   16  History of hypercalcemia (V12 29) (Z86 39)   17  History of metrorrhagia (V13 29) (Z87 42)   18  Hyperlipidemia, unspecified (272 4) (E78 5)   19  Hyperproteinemia (273 8) (E88 09)   20  Hypoglycemia (251 2) (E16 2)   21  Hypothyroidism (244 9) (E03 9)   22  Impacted cerumen of right ear (380 4) (H61 21)   23  Lactic acidosis (276 2) (E87 2)   24  Microscopic hematuria (599 72) (R31 29)   25  Multiple benign nevi (216 9) (D22 9)   26  Neuropathy, peripheral (356 9) (G62 9)   27  Night sweats (780 8) (R61)   28  Nonalcoholic fatty liver disease (571 8) (K76 0)   29  Numbness and tingling in right hand (782 0) (R20 0,R20 2)   30  Numbness in feet (782 0) (R20 0)   31  OAB (overactive bladder) (596 51) (N32 81)   32  Obesity (278 00) (E66 9)   33  Pancreas, 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)   34  Paresthesia (782 0) (R20 2)   35  Prolapse of female pelvic organs (618 9) (N81 9)   36  Screening for skin condition (V82 0) (Z13 89)   37  Slurred speech (784 59) (R47 81)   38  Syncope (780 2) (R55)   39  Transient ischemic attack (435 9) (G45 9)   40  Type 2 diabetes mellitus (250 00) (E11 9)   41   Type 2 diabetes mellitus with microalbuminuria (250 40,791 0) (E11 29,R80 9)   42  Urinary, incontinence, stress female (625 6) (N39 3)   43  Vagina bleeding (623 8) (N93 9)   44  Vitamin D deficiency (268 9) (E55 9)   45  Word finding difficulty (V40 1) (R47 89)    Past Medical History  1  History of Abdominal pain, LLQ (left lower quadrant) (789 04) (R10 32)   2  History of Abdominal tenderness, right upper quadrant (789 61) (R10 811)   3  History of Acute UTI (599 0) (N39 0)   4  History of Atypical chest pain (786 59) (R07 89)   5  History of Cut of finger (883 0) (S61 209A)   6  History of Foreign body in ear, left, initial encounter (436 2743) (T16 2XXA)   7  History of endometriosis (V13 29) (Z87 42)   8  History of hypercalcemia (V12 29) (Z86 39)   9  History of metrorrhagia (V13 29) (Z87 42)   10  History of syncope (V15 89) (Z87 898)   11  History of Hyponatremia (276 1) (E87 1)   12  History of Hyponatremia (276 1) (E87 1)   13  History of Microscopic hematuria (599 72) (R31 29)  Active Problems And Past Medical History Reviewed: The active problems and past medical history were reviewed and updated today  Surgical History  1  History of Cholecystectomy Laparoscopic   2  History of Hysterectomy   3  History of Oophorectomy - Unilateral (Removal Of One Ovary)  Surgical History Reviewed: The surgical history was reviewed and updated today  Family History  Mother    1  Family history of Anxiety   2  Family history of arthritis (V17 7) (Z82 61)   3  Family history of depression (V17 0) (Z81 8)   4  Family history of diabetes mellitus (V18 0) (Z83 3)   5  Family history of hypertension (V17 49) (Z82 49)   6  Family history of myocardial infarction (V17 3) (Z82 49)  Father    7  Family history of hypertension (V17 49) (Z82 49)  Child    8  Family history of depression (V17 0) (Z81 8)  Daughter    5  Family history of Anxiety  Son    10  Family history of depression (V17 0) (Z81 8)  Sister    6   Family history of asthma (V17 5) (Z82 5)   12  Family history of diabetes mellitus (V18 0) (Z83 3)   13  Family history of malignant neoplasm of breast (V16 3) (Z80 3)   14  Family history of melanoma (V16 8) (Z80 8)  Maternal Grandmother    13  Family history of colon cancer (V16 0) (Z80 0)  Paternal Grandmother    12  Family history of colon cancer (V16 0) (Z80 0)  Maternal Aunt    16  Family history of arthritis (V17 7) (Z82 61)   18  Family history of depression (V17 0) (Z81 8)   19  Family history of diabetes mellitus (V18 0) (Z83 3)   20  Family history of hypertension (V17 49) (Z82 49)   21  Family history of malignant neoplasm of breast (V16 3) (Z80 3)  Paternal Aunt    25  Family history of arthritis (V17 7) (Z82 61)   23  Family history of diabetes mellitus (V18 0) (Z83 3)   24  Family history of hypertension (V17 49) (Z82 49)   25  Family history of malignant neoplasm of breast (V16 3) (Z80 3)  Maternal Uncle    26  Family history of diabetes mellitus (V18 0) (Z83 3)  Paternal Uncle    32  Family history of diabetes mellitus (V18 0) (Z83 3)  Cousin    29  Family history of arthritis (V17 7) (Z82 61)   29  Family history of hypertension (V17 49) (Z82 49)  Family History Reviewed: The family history was reviewed and updated today  Social History   · Caffeine use (V49 89) (F15 90)   ·    · Never a smoker   · No drug use   · Social alcohol use (Z78 9)  Social History Reviewed: The social history was reviewed and updated today  The social history was reviewed and is unchanged  Current Meds   1  Accu-Chek Melisa Plus In Vitro Strip; TEST 4 TIMES A DAY; Therapy: 87Rey0761 to (Evaluate:64Kty3960)  Requested for: 08Xtf7077; Last Rx:84Pxo6442   Ordered   2  Atorvastatin Calcium 10 MG Oral Tablet; TAKE 1 TABLET DAILY; Therapy: (Recorded:67Suu4828) to Recorded   3  BD Pen Needle Marina U/F 32G X 4 MM Miscellaneous; USE AS DIRECTED FOR INJECTING INSULIN- 2   TIMES DAILY;    Therapy: 58CDZ3246 to (HMRBNQFH:48XUP5761)  Requested for: 00ATL2747; Last Rx:41Gei8151   Ordered   4  Glucagon Emergency 1 MG Injection Kit; USE AS DIRECTED; Therapy: 64KZO9618 to (Last Rx:27Onu8560)  Requested for: 58JUY9476 Ordered   5  Jardiance 10 MG Oral Tablet; TAKE 1 TABLET BY MOUTH ONCE DAILY; Therapy: 12GDV4981 to Recorded   6  Lantus SoloStar 100 UNIT/ML Subcutaneous Solution Pen-injector; INJECT 18 UNIT Daily; Therapy: (Recorded:67Krx7997) to Recorded   7  Levothyroxine Sodium 75 MCG Oral Tablet; take 1 tablet by mouth every day; Therapy: 36QCW9826 to (Aishwarya Perez)  Requested for: 74Dwu3632; Last Rx:34Fhy5719   Ordered   8  Lisinopril 2 5 MG Oral Tablet; TAKE 1 TABLET BY MOUTH ONCE A DAY; Therapy: 59FPH6224 to (Evaluate:22Apr2018)  Requested for: 27Apr2017; Last Rx:57Ujv7443   Ordered   9  MetFORMIN HCl  MG Oral Tablet Extended Release 24 Hour; Take 1 tablet twice daily; Therapy: (Recorded:31Czx6155) to Recorded   10  Ventolin  (90 Base) MCG/ACT Inhalation Aerosol Solution; USE 2 PUFFS EVERY 6 HOURS    AS DIRECTED  Requested for: 76Tda2289; Last PU:29DWZ4585 Ordered   11  Victoza 18 MG/3ML Subcutaneous Solution Pen-injector; INJECT 1 8 MG Daily; Therapy: (Recorded:10Aug2016) to Recorded  Medication List Reviewed: The medication list was reviewed and updated today  Allergies  1  Cephalosporins   2  Norflex   3  Ultracet TABS   4  Rocephin SOLR   5  Sulfa Drugs    Vitals  Vital Signs    Recorded: 96MUN5662 10:51AM Recorded: 94KLB6175 09:59AM   Temperature  98 7 F   Heart Rate  090   Systolic 482, LUE, Sitting    Diastolic 70, LUE, Sitting    Height  5 ft 3 in   Weight  176 lb 8 oz   BMI Calculated  31 27   BSA Calculated  1 83   O2 Saturation  98     Physical Exam    Constitutional   General appearance: Abnormal   appears healthy-- and-- overweight  Neck   Neck: Supple, symmetric, trachea midline, no masses  Thyroid: Normal, no thyromegaly      Pulmonary   Respiratory effort: No increased work of breathing or signs of respiratory distress  Auscultation of lungs: Clear to auscultation  Cardiovascular   Auscultation of heart: Normal rate and rhythm, normal S1 and S2, no murmurs  Carotid pulses: 2+ bilaterally  Examination of extremities for edema and/or varicosities: Normal     Abdomen   Abdomen: Non-tender, no masses  Liver and spleen: No hepatomegaly or splenomegaly  Skin Multiple dysplastic nevi on neck and back  Neurologic   Cranial nerves: Cranial nerves II-XII intact  Cortical function: Normal mental status  Reflexes: 2+ and symmetric  Sensation: No sensory loss  Coordination: Normal finger to nose and heel to shin  Psychiatric   Judgment and insight: Normal     Orientation to person, place, and time: Normal     Recent and remote memory: Intact  Mood and affect: Normal        Results/Data  (1) GGT 16Oct2017 10:15AM [facility] Order Number: [id number]     Test Name Result Flag Reference   GGT 94 U/L H 5-85       Future Appointments    Date/Time Provider Specialty Site   05/03/2018 11:00 AM STELLA Granados  Nephrology [address]   11/16/2017 09:15 AM STELLA [place]   Dermatology [facility]     Signatures   Electronically signed by : Ronaldo Najera DO; Oct 17 2017 11:02AM EST                       (Author)

## 2017-11-07 ENCOUNTER — ALLSCRIPTS OFFICE VISIT (OUTPATIENT)
Dept: OTHER | Facility: OTHER | Age: 42
End: 2017-11-07

## 2017-11-07 ENCOUNTER — TRANSCRIBE ORDERS (OUTPATIENT)
Dept: MAMMOGRAPHY | Facility: CLINIC | Age: 42
End: 2017-11-07

## 2017-11-07 ENCOUNTER — APPOINTMENT (OUTPATIENT)
Dept: LAB | Facility: CLINIC | Age: 42
End: 2017-11-07
Payer: COMMERCIAL

## 2017-11-07 DIAGNOSIS — E78.5 HYPERLIPIDEMIA: ICD-10-CM

## 2017-11-07 DIAGNOSIS — R74.8 ACID PHOSPHATASE ELEVATED: ICD-10-CM

## 2017-11-07 DIAGNOSIS — R74.8 ABNORMAL LEVELS OF OTHER SERUM ENZYMES: ICD-10-CM

## 2017-11-07 DIAGNOSIS — R74.8 ACID PHOSPHATASE ELEVATED: Primary | ICD-10-CM

## 2017-11-07 LAB
ALBUMIN SERPL BCP-MCNC: 4.3 G/DL (ref 3.5–5)
ALP SERPL-CCNC: 112 U/L (ref 46–116)
ALT SERPL W P-5'-P-CCNC: 42 U/L (ref 12–78)
ANION GAP SERPL CALCULATED.3IONS-SCNC: 8 MMOL/L (ref 4–13)
AST SERPL W P-5'-P-CCNC: 13 U/L (ref 5–45)
BILIRUB SERPL-MCNC: 0.38 MG/DL (ref 0.2–1)
BUN SERPL-MCNC: 13 MG/DL (ref 5–25)
CALCIUM ALBUM COR SERPL-MCNC: 10.2 MG/DL (ref 8.3–10.1)
CALCIUM SERPL-MCNC: 10.4 MG/DL (ref 8.3–10.1)
CHLORIDE SERPL-SCNC: 106 MMOL/L (ref 100–108)
CO2 SERPL-SCNC: 22 MMOL/L (ref 21–32)
CREAT SERPL-MCNC: 0.68 MG/DL (ref 0.6–1.3)
FERRITIN SERPL-MCNC: 71 NG/ML (ref 8–388)
GFR SERPL CREATININE-BSD FRML MDRD: 109 ML/MIN/1.73SQ M
GLUCOSE P FAST SERPL-MCNC: 108 MG/DL (ref 65–99)
IRON SATN MFR SERPL: 17 %
IRON SERPL-MCNC: 55 UG/DL (ref 50–170)
POTASSIUM SERPL-SCNC: 3.9 MMOL/L (ref 3.5–5.3)
PROT SERPL-MCNC: 8.7 G/DL (ref 6.4–8.2)
SODIUM SERPL-SCNC: 136 MMOL/L (ref 136–145)
TIBC SERPL-MCNC: 332 UG/DL (ref 250–450)
TSH SERPL DL<=0.05 MIU/L-ACNC: 2.76 UIU/ML (ref 0.36–3.74)

## 2017-11-07 PROCEDURE — 83516 IMMUNOASSAY NONANTIBODY: CPT

## 2017-11-07 PROCEDURE — 83550 IRON BINDING TEST: CPT

## 2017-11-07 PROCEDURE — 86704 HEP B CORE ANTIBODY TOTAL: CPT

## 2017-11-07 PROCEDURE — 86235 NUCLEAR ANTIGEN ANTIBODY: CPT

## 2017-11-07 PROCEDURE — 86803 HEPATITIS C AB TEST: CPT

## 2017-11-07 PROCEDURE — 82784 ASSAY IGA/IGD/IGG/IGM EACH: CPT | Performed by: INTERNAL MEDICINE

## 2017-11-07 PROCEDURE — 87340 HEPATITIS B SURFACE AG IA: CPT

## 2017-11-07 PROCEDURE — 84443 ASSAY THYROID STIM HORMONE: CPT

## 2017-11-07 PROCEDURE — 80053 COMPREHEN METABOLIC PANEL: CPT

## 2017-11-07 PROCEDURE — 82728 ASSAY OF FERRITIN: CPT

## 2017-11-07 PROCEDURE — 86256 FLUORESCENT ANTIBODY TITER: CPT

## 2017-11-07 PROCEDURE — 86038 ANTINUCLEAR ANTIBODIES: CPT

## 2017-11-07 PROCEDURE — 86705 HEP B CORE ANTIBODY IGM: CPT

## 2017-11-07 PROCEDURE — 83540 ASSAY OF IRON: CPT

## 2017-11-07 PROCEDURE — 36415 COLL VENOUS BLD VENIPUNCTURE: CPT

## 2017-11-08 LAB
ACTIN IGG SERPL-ACNC: 32 UNITS (ref 0–19)
HBV CORE AB SER QL: NORMAL
HBV CORE IGM SER QL: NORMAL
HBV SURFACE AG SER QL: NORMAL
HCV AB SER QL: NORMAL
IGA SERPL-MCNC: 278 MG/DL (ref 70–400)
IGG SERPL-MCNC: 1270 MG/DL (ref 700–1600)
IGM SERPL-MCNC: 101 MG/DL (ref 40–230)
MITOCHONDRIA M2 IGG SER-ACNC: 7.3 UNITS (ref 0–20)
RYE IGE QN: NEGATIVE
TTG IGA SER-ACNC: <2 U/ML (ref 0–3)

## 2017-11-08 NOTE — CONSULTS
Assessment  1  Abnormal liver enzymes (790 5) (R74 8)   2  Abdominal pain, chronic, epigastric (277 10,323 14) (R10 13,G89 29)    Plan  Abnormal liver enzymes    · Pantoprazole Sodium 40 MG Oral Tablet Delayed Release; TAKE 1 TABLET DAILY   Rx By: Imelda Mccollum; Dispense: 30 Days ; #:30 Tablet Delayed Release; Refill: 3;For: Abnormal liver enzymes; ANIBAL = N; Verified Transmission to [facility]Y #[id number] Last Updated By: System, Sure[employer]; 2017 10:28:10 AM   · (1) CARL SCREEN W/REFLEX TO TITER/PATTERN; Status:Active; Requested  [id number];    Perform:[facility]; DVM:25SQY9713; Ordered; For:Abnormal liver enzymes; Ordered By:Don Houston;   · (1) CHRONIC HEPATITIS PANEL; Status:Active; Requested JVO:10JUP9486;    Perform:[facility]; MAP:33OAF0055; Ordered; For:Abnormal liver enzymes; Ordered By:Don Houston;   · (1) COMPREHENSIVE METABOLIC PANEL; Status:Active; Requested SDC:00USX6118;    Perform:[facility]; VXY:51TSR7426; Ordered; For:Abnormal liver enzymes; Ordered By:Don Houston;   · (1) FERRITIN; Status:Active; Requested IBR:[id number];    Perform:[facility]; LZA:12PYC7827; Ordered; For:Abnormal liver enzymes; Ordered By:Don Houston;   · (1) IRON SATURATION %, TIBC; Status:Active; Requested UWO:[id number];    Perform:[facility]; YYS:85GER1022; Ordered; For:Abnormal liver enzymes; Ordered By:Don Houston;   · (1) MITOCHONDRIAL ANTIBODY; Status:Active; Requested UAJ:[id number];    Perform:[facility]; VCW:57PPL5622; Ordered; For:Abnormal liver enzymes; Ordered By:Don Houston;   · (1) SMOOTH MUSCLE ANTIBODY; Status:Active; Requested RVC:[id number];    Perform:[facility]; SQV:63GXR0400; Ordered; For:Abnormal liver enzymes; Ordered By:Don Houston;   · (1) TISSUE TRANSGLUTAMINASE IGA; Status:Active; Requested NURYS:[id number];    Perform:[facility]; ZSX:76WJL6587; Ordered; For:Abnormal liver enzymes;  Ordered By:Cris Houston;   · (Q) IMMUNOGLOBULIN G; Status:Active; Requested GVB:16DGR1185;    Perform:[facility]; DYH:72UXM2348; Ordered; For:Abnormal liver enzymes; Ordered By:Cris Houston;   · (Q) IMMUNOGLOBULINS; Status:Active; Requested PPJ:72FFJ3639;    Perform:[facility]; JLN:98OWW1020; Ordered; For:Abnormal liver enzymes; Ordered By:Cris Houston;   · EGD; Status:Hold For - Scheduling; Requested YOS:43CWS0964;    Perform:[facility]; RGO:47BYH6312;CBKQANU; For:Abnormal liver enzymes; Ordered By:Cris Houston; Abnormal liver enzymes, Hyperlipidemia, unspecified    · (1) TSH; Status:Active; Requested RLZ:[id number];    Perform:[facility]; BCW:09QQY1529; Ordered; For:Abnormal liver enzymes, Hyperlipidemia, unspecified; Ordered By:Don Houston; Discussion/Summary  Discussion Summary:   She is a 60-year-old female with abnormal LFTs  She was told that she has fatty liver disease  She has a elevated alkaline phosphatase and total protein with a smooth muscle antibody of 39  addition he has postprandial epigastric abdominal pain which did not improve after gallbladder removal a few months ago  She's never had an antiacid trial and she had endoscopy colonoscopy at [facility] with no biopsy of her stomach   we'll then send lab work for abnormal LFTs  She may need a liver biopsy if the diagnosis of autoimmune hepatitis is in question  it is acceptable risk for her to continue on her atorvastatin at present  she will have an endoscopy scheduled  will give PPI trial       Counseling Documentation With Imm: The patient was counseled regarding diagnostic results,-- prognosis,-- risks and benefits of treatment options  History of Present Illness  HPI: She is a 60-year-old female with abnormal LFTs  She is a slightly elevated alkaline phosphatase and her smooth muscle antibody is 39  Her total protein is elevated as well   The patient has been told that she has fatty liver she has other manifestations of the metabolic syndrome she has diabetes and central adiposity  She reports some weight loss  She does not drink alcohol  She is no family history of liver diseases or autoimmune diseases  She 6 no herbal medications  She's been on atorvastatin for 2 years without problems  she's been having postprandial epigastric abdominal pain intermittently  She was told this was from her gallbladder in September at [facility] and she got her gallbladder removed but her pain is persisted  She reports that she had endoscopy colonoscopy with no biopsy for H  pylori prior to this  She is no heartburn no nausea vomiting no regurgitation  Review of Systems  Complete-Female GI Adult:   Constitutional: No fever, no chills, feels well, no tiredness, no recent weight gain or weight loss  Eyes: No complaints of eye pain, no red eyes, no eyesight problems, no discharge, no dry eyes, no itching of eyes  ENT: no complaints of earache, no loss of hearing, no nose bleeds, no nasal discharge, no sore throat, no hoarseness  Cardiovascular: No complaints of slow heart rate, no fast heart rate, no chest pain, no palpitations, no leg claudication, no lower extremity edema  Respiratory: No complaints of shortness of breath, no wheezing, no cough, no SOB on exertion, no orthopnea, no PND  Gastrointestinal: No complaints of abdominal pain, no constipation, no nausea or vomiting, no diarrhea, no bloody stools  Genitourinary: No complaints of dysuria, no incontinence, no pelvic pain, no dysmenorrhea, no vaginal discharge or bleeding  Musculoskeletal: No complaints of arthralgias, no myalgias, no joint swelling or stiffness, no limb pain or swelling  Integumentary: No complaints of skin rash or lesions, no itching, no skin wounds, no breast pain or lump  Neurological: No complaints of headache, no confusion, no convulsions, no numbness, no dizziness or fainting, no tingling, no limb weakness, no difficulty walking  Psychiatric: Not suicidal, no sleep disturbance, no anxiety or depression, no change in personality, no emotional problems  Endocrine: No complaints of proptosis, no hot flashes, no muscle weakness, no deepening of the voice, no feelings of weakness  Hematologic/Lymphatic: No complaints of swollen glands, no swollen glands in the neck, does not bleed easily, does not bruise easily  ROS Reviewed:   ROS reviewed  Active Problems  1  Abnormal liver enzymes (790 5) (R74 8)   2  Abnormal mammogram (793 80) (R92 8)   3  Allergy to sulfa drugs (V14 2) (Z88 2)   4  Asthma, mild intermittent (493 90) (J45 20)   5  Benign essential hypertension (401 1) (I10)   6  Borderline abnormal thyroid function test (794 5) (R94 6)   7  Cystocele with uterine prolapse (618 4) (N81 4)   8  Depression (311) (F32 9)   9  Diabetes (250 00) (E11 9)   10  Diabetes mellitus with proteinuria (250 40,791 0) (E11 29,R80 9)   11  Dizziness (780 4) (R42)   12  Dysplastic nevi (216 9) (D23 9)   13  Elevated serum GGT level (790 5) (R74 8)   14  Elevated total protein (790 99) (R77 8)   15  Encounter for screening mammogram for malignant neoplasm of breast (V76 12)    (Z12 31)   16  Flu vaccine need (V04 81) (Z23)   17  Frontal headache (784 0) (R51)   18  Headache (784 0) (R51)   19  Hematuria (599 70) (R31 9)   20  History of hypercalcemia (V12 29) (Z86 39)   21  History of metrorrhagia (V13 29) (Z87 42)   22  Hyperlipidemia, unspecified (272 4) (E78 5)   23  Hyperproteinemia (273 8) (E88 09)   24  Hypoglycemia (251 2) (E16 2)   25  Hypothyroidism (244 9) (E03 9)   26  Impacted cerumen of right ear (380 4) (H61 21)   27  Lactic acidosis (276 2) (E87 2)   28  Microscopic hematuria (599 72) (R31 29)   29  Multiple benign nevi (216 9) (D22 9)   30  Neuropathy, peripheral (356 9) (G62 9)   31  Night sweats (780 8) (R61)   32  Nonalcoholic fatty liver disease (571 8) (K76 0)   33   Numbness and tingling in right hand (782 0) (R20 0,R20 2) 34  Numbness in feet (782 0) (R20 0)   35  OAB (overactive bladder) (596 51) (N32 81)   36  Obesity (278 00) (E66 9)   37  Pancreas, 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)   38  Paresthesia (782 0) (R20 2)   39  Prolapse of female pelvic organs (618 9) (N81 9)   40  Rectocele (618 04) (N81 6)   41  Screening for skin condition (V82 0) (Z13 89)   42  Slurred speech (784 59) (R47 81)   43  Syncope (780 2) (R55)   44  Transient ischemic attack (435 9) (G45 9)   45  Type 2 diabetes mellitus (250 00) (E11 9)   46  Type 2 diabetes mellitus with microalbuminuria (250 40,791 0) (E11 29,R80 9)   47  Urinary, incontinence, stress female (625 6) (N39 3)   48  Vagina bleeding (623 8) (N93 9)   49  Vitamin D deficiency (268 9) (E55 9)   50  Word finding difficulty (V40 1) (R47 89)    Past Medical History  1  History of Abdominal pain, LLQ (left lower quadrant) (789 04) (R10 32)   2  History of Abdominal tenderness, right upper quadrant (789 61) (R10 811)   3  History of Acute UTI (599 0) (N39 0)   4  History of Atypical chest pain (786 59) (R07 89)   5  History of Cut of finger (883 0) (S61 209A)   6  History of Foreign body in ear, left, initial encounter (436 5978) (T16 2XXA)   7  History of endometriosis (V13 29) (Z87 42)   8  History of hypercalcemia (V12 29) (Z86 39)   9  History of metrorrhagia (V13 29) (Z87 42)   10  History of syncope (V15 89) (Z87 898)   11  History of Hyponatremia (276 1) (E87 1)   12  History of Hyponatremia (276 1) (E87 1)   13  History of Microscopic hematuria (599 72) (R31 29)  Active Problems And Past Medical History Reviewed: The active problems and past medical history were reviewed and updated today  Surgical History  1  History of Cholecystectomy Laparoscopic   2  History of Hysterectomy   3  History of Oophorectomy - Unilateral (Removal Of One Ovary)  Surgical History Reviewed: The surgical history was reviewed and updated today  Family History  Mother    1  Family history of Anxiety   2  Family history of arthritis (V17 7) (Z82 61)   3  Family history of depression (V17 0) (Z81 8)   4  Family history of diabetes mellitus (V18 0) (Z83 3)   5  Family history of hypertension (V17 49) (Z82 49)   6  Family history of myocardial infarction (V17 3) (Z82 49)  Father    7  Family history of alcoholism (V17 0) (Z81 1)   8  Family history of hypertension (V17 49) (Z82 49)  Child    9  Family history of depression (V17 0) (Z81 8)  Daughter    8  Family history of Anxiety  Son    6  Family history of depression (V17 0) (Z81 8)  Sister    15  Family history of asthma (V17 5) (Z82 5)   13  Family history of diabetes mellitus (V18 0) (Z83 3)   14  Family history of malignant neoplasm of breast (V16 3) (Z80 3)   15  Family history of melanoma (V16 8) (Z80 8)  Maternal Grandmother    12  Family history of colon cancer (V16 0) (Z80 0)  Paternal Grandmother    16  Family history of colon cancer (V16 0) (Z80 0)  Maternal Aunt    25  Family history of arthritis (V17 7) (Z82 61)   19  Family history of depression (V17 0) (Z81 8)   20  Family history of diabetes mellitus (V18 0) (Z83 3)   21  Family history of hypertension (V17 49) (Z82 49)   22  Family history of malignant neoplasm of breast (V16 3) (Z80 3)  Paternal Aunt    21  Family history of arthritis (V17 7) (Z82 61)   24  Family history of diabetes mellitus (V18 0) (Z83 3)   25  Family history of hypertension (V17 49) (Z82 49)   26  Family history of malignant neoplasm of breast (V16 3) (Z80 3)  Maternal Uncle    32  Family history of diabetes mellitus (V18 0) (Z83 3)  Paternal Uncle    29  Family history of diabetes mellitus (V18 0) (Z83 3)  Cousin    34  Family history of arthritis (V17 7) (Z82 61)   30  Family history of hypertension (V17 49) (Z82 49)   31  Family history of substance abuse (V17 0) (Z81 4)  Family History Reviewed: The family history was reviewed and updated today         Social History   · Caffeine use (V49 89) (F15 90)   ·    · Never a smoker   · No drug use   · Social alcohol use (Z78 9)  Social History Reviewed: The social history was reviewed and updated today  The social history was reviewed and is unchanged  Current Meds   1  Accu-Chek Melisa Plus In Vitro Strip; TEST 4 TIMES A DAY; Therapy: 28Abw0261 to (Evaluate:12Ywj9879)  Requested for: 25Adr6279; Last   Rx:95Njq2096 Ordered   2  Atorvastatin Calcium 10 MG Oral Tablet; TAKE 1 TABLET DAILY; Therapy: (Recorded:10Aug2016) to Recorded   3  BD Pen Needle Marina U/F 32G X 4 MM Miscellaneous; USE AS DIRECTED FOR   INJECTING INSULIN- 2 TIMES DAILY; Therapy: 46HIX1506 to ([phone])  Requested for: 04Apr2017; Last   Rx:04Apr2017 Ordered   4  Glucagon Emergency 1 MG Injection Kit; USE AS DIRECTED; Therapy: 17XUB4888 to (Last Rx:18Xxj4290)  Requested for: 88PKI9441 Ordered   5  Jardiance 10 MG Oral Tablet; TAKE 1 TABLET BY MOUTH ONCE DAILY; Therapy: 44JGH4986 to Recorded   6  Lantus SoloStar 100 UNIT/ML Subcutaneous Solution Pen-injector; INJECT 18 UNIT   Daily; Therapy: (Recorded:17Oct2017) to Recorded   7  Levothyroxine Sodium 75 MCG Oral Tablet; take 1 tablet by mouth every day; Therapy: 12GGF1062 to (Evaluate:15Jan2018)  Requested for: 34EVB9477; Last   Rx:17Oct2017 Ordered   8  Lisinopril 2 5 MG Oral Tablet; TAKE 1 TABLET BY MOUTH ONCE A DAY; Therapy: 34KQR9186 to (Evaluate:22Apr2018)  Requested for: 27Apr2017; Last   Rx:27Apr2017 Ordered   9  MetFORMIN HCl  MG Oral Tablet Extended Release 24 Hour; Take 1 tablet twice   daily; Therapy: (Recorded:10Aug2016) to Recorded   10  Oxybutynin Chloride 5 MG Oral Tablet; Therapy: (679 738 973) to Recorded   11  Phentermine HCl - 37 5 MG Oral Tablet; Therapy: (679 738 973) to Recorded   12  Topamax 25 MG Oral Tablet; Therapy: (679 738 973) to Recorded   13   Ventolin  (90 Base) MCG/ACT Inhalation Aerosol Solution; USE 2 PUFFS EVERY    6 HOURS AS DIRECTED  Requested for: 02QMY0103; Last Rx:38Fpy9685 Ordered   14  Victoza 18 MG/3ML Subcutaneous Solution Pen-injector; INJECT 1 8 MG Daily; Therapy: (Recorded:10Aug2016) to Recorded  Medication List Reviewed: The medication list was reviewed and updated today  Allergies  1  Cephalosporins   2  Norflex   3  Ultracet TABS   4  Rocephin SOLR   5  Sulfa Drugs    Vitals  Vital Signs    Recorded: 20OSO0548 10:08AM   Heart Rate 84   Systolic 552   Diastolic 84   Height 5 ft 3 in   Weight 169 lb 6 oz   BMI Calculated 30   BSA Calculated 1 8     Physical Exam    Constitutional   General appearance: No acute distress, well appearing and well nourished  Eyes   Conjunctiva and lids: No swelling, erythema or discharge  Pupils and irises: Equal, round and reactive to light  Ears, Nose, Mouth, and Throat   External inspection of ears and nose: Normal     Otoscopic examination: Tympanic membranes translucent with normal light reflex  Canals patent without erythema  Nasal mucosa, septum, and turbinates: Normal without edema or erythema  Oropharynx: Normal with no erythema, edema, exudate or lesions  Pulmonary   Respiratory effort: No increased work of breathing or signs of respiratory distress  Auscultation of lungs: Clear to auscultation  Cardiovascular   Palpation of heart: Normal PMI, no thrills  Auscultation of heart: Normal rate and rhythm, normal S1 and S2, without murmurs  Examination of extremities for edema and/or varicosities: Normal     Carotid pulses: Normal     Abdomen   Abdomen: Non-tender, no masses  Liver and spleen: No hepatomegaly or splenomegaly  Lymphatic   Palpation of lymph nodes in neck: No lymphadenopathy  Musculoskeletal   Gait and station: Normal     Digits and nails: Normal without clubbing or cyanosis  Inspection/palpation of joints, bones, and muscles: Normal     Skin   Skin and subcutaneous tissue: Normal without rashes or lesions      Neurologic   Cranial nerves: Cranial nerves 2-12 intact  Reflexes: 2+ and symmetric  Sensation: No sensory loss  Future Appointments    Date/Time Provider Specialty Site   05/03/2018 11:00 AM STELLA Corrigan  Nephrology  [address] [facility] [place]   02/28/2018 10:30 AM Mercedes Figueroa DO Internal Medicine [facility] INTERNAL MED [facility]   11/16/2017 09:15 AM STELLA Cyr   Dermatology [facility]PATIENT RE[facility]     Signatures   Electronically signed by : John Cisse MD; Nov 7 2017 10:35AM EST                       (Author)

## 2017-11-09 ENCOUNTER — GENERIC CONVERSION - ENCOUNTER (OUTPATIENT)
Dept: OTHER | Facility: OTHER | Age: 42
End: 2017-11-09

## 2017-11-09 ENCOUNTER — APPOINTMENT (OUTPATIENT)
Dept: LAB | Facility: CLINIC | Age: 42
End: 2017-11-09
Payer: COMMERCIAL

## 2017-11-09 DIAGNOSIS — R74.8 ABNORMAL LEVELS OF OTHER SERUM ENZYMES: ICD-10-CM

## 2017-11-09 PROCEDURE — 83883 ASSAY NEPHELOMETRY NOT SPEC: CPT

## 2017-11-09 PROCEDURE — 36415 COLL VENOUS BLD VENIPUNCTURE: CPT

## 2017-11-10 LAB
KAPPA LC FREE SER-MCNC: 17.8 MG/L (ref 3.3–19.4)
KAPPA LC FREE/LAMBDA FREE SER: 1.06 {RATIO} (ref 0.26–1.65)
LAMBDA LC FREE SERPL-MCNC: 16.8 MG/L (ref 5.7–26.3)

## 2017-11-29 ENCOUNTER — ALLSCRIPTS OFFICE VISIT (OUTPATIENT)
Dept: OTHER | Facility: OTHER | Age: 42
End: 2017-11-29

## 2017-11-30 NOTE — PROGRESS NOTES
Assessment    1  Abdominal pain, chronic, epigastric (401 19,766 13) (R10 13,G89 29)   2  Abnormal liver enzymes (790 5) (R74 8)   3  Hyperproteinemia (273 8) (E88 09)    Discussion/Summary  Discussion Summary:   Ms Reshma Coombs is a 39 yo F presenting for follow up of epigastric pain and elevated antismooth muscle antibody  Epigastric Pain- Mostly resolved since starting PPI course 3 weeks ago  Continue once daily protonix for 8 weeks at least  Discussed long term PPI use and side effects  No evidence of Maki's, H  pylori, Celiac on EGD biopsies  Will transition to H2 blocker after 8-12 weeks of PPI  Elevated Anti-smooth muscle antibody- Persistently elevated on repeated bloodwork over the past year  Her LFTs are normal  Her other liver testing is negative including AMA, CARL, chronic hep panel, ferritin, TSH, celiac, immunoglobulins  She will have repeat LFT testing in January with her PCP  We discussed need for possible liver biopsy to determine the significance of the elevated anti-smooth muscle antibody  We also may need a rheumatology evaluation  She doesn't have any family history of autoimmune disease except for a cousin who has lupus  Will discuss case further with Dr Ganga Julian regarding need for further testing/liver biopsy at this time  May need to consider anti-smooth muscle ab titer  Counseling Documentation With Imm: The patient was counseled regarding  Medication SE Review and Pt Understands Tx: The treatment plan was reviewed with the patient/guardian  The patient/guardian understands and agrees with the treatment plan      Chief Complaint  Chief Complaint Free Text Note Form: Follow-up for epigastric pain and positive anti smooth muscle antibody      History of Present Illness  HPI: Ms Reshma Coombs is a 28-year-old female presenting for follow-up of epigastric pain as well as a mildly elevated alk-phos earlier this year with persistently elevated anti smooth muscle antibody   In terms of the liver testing, she had extensive laboratory workup which showed normal immunoglobulins, negative AMA, negative celiac, negative CARL, negative hepatitis panel, negative iron panel, normal TSH, and persistently elevated anti smooth muscle antibody in the 30s  she was tested for free light chains due to elevated calcium and protein her blood work and this was negative  She is not having any jaundice or itching  In terms of the epigastric pain, she had an EGD on November 9th the mucosa appeared normal biopsies were taken and were negative for celiac or H pylori  She has been on Protonix for 3-4 weeks and the pain is mostly resolved  She denies any nausea, vomiting, reflux, diarrhea, constipation  She is very happy with the results  She is losing weight but this is intentional and is due to diet modification due to her diabetes and trying to get off of insulin  History Reviewed: The history was obtained today from the patient and I agree with the documented history  Review of Systems  Complete-Female GI Adult:  Constitutional: no fever,-- no chills-- and-- no recent weight loss  Cardiovascular: no chest pain-- and-- no palpitations  Respiratory: no shortness of breath-- and-- no cough  Gastrointestinal: no abdominal pain,-- no nausea,-- no vomiting,-- no constipation,-- no diarrhea-- and-- no blood in stools  Genitourinary: no dysuria  Musculoskeletal: no arthralgias  Integumentary: no rashes  Neurological: no headache-- and-- no confusion  Other Symptoms: The remainder of the ten ROS was negative  ROS Reviewed:   ROS reviewed  Active Problems    1  Abdominal pain, chronic, epigastric (544 55,276 64) (R10 13,G89 29)   2  Abnormal liver enzymes (790 5) (R74 8)   3  Abnormal mammogram (793 80) (R92 8)   4  Allergy to sulfa drugs (V14 2) (Z88 2)   5  Asthma, mild intermittent (493 90) (J45 20)   6  Benign essential hypertension (401 1) (I10)   7   Borderline abnormal thyroid function test (794 5) (R94 6) 8  Cystocele with uterine prolapse (618 4) (N81 4)   9  Depression (311) (F32 9)   10  Diabetes (250 00) (E11 9)   11  Diabetes mellitus with proteinuria (250 40,791 0) (E11 29,R80 9)   12  Dizziness (780 4) (R42)   13  Dysplastic nevi (216 9) (D23 9)   14  Elevated serum GGT level (790 5) (R74 8)   15  Elevated total protein (790 99) (R77 8)   16  Encounter for screening mammogram for malignant neoplasm of breast (V76 12)  (Z12 31)   17  Flu vaccine need (V04 81) (Z23)   18  Frontal headache (784 0) (R51)   19  Headache (784 0) (R51)   20  Hematuria (599 70) (R31 9)   21  History of hypercalcemia (V12 29) (Z86 39)   22  History of metrorrhagia (V13 29) (Z87 42)   23  Hyperlipidemia, unspecified (272 4) (E78 5)   24  Hyperproteinemia (273 8) (E88 09)   25  Hypoglycemia (251 2) (E16 2)   26  Hypothyroidism (244 9) (E03 9)   27  Impacted cerumen of right ear (380 4) (H61 21)   28  Lactic acidosis (276 2) (E87 2)   29  Microscopic hematuria (599 72) (R31 29)   30  Multiple benign nevi (216 9) (D22 9)   31  Neuropathy, peripheral (356 9) (G62 9)   32  Night sweats (780 8) (R61)   33  Nonalcoholic fatty liver disease (571 8) (K76 0)   34  Numbness and tingling in right hand (782 0) (R20 0,R20 2)   35  Numbness in feet (782 0) (R20 0)   36  OAB (overactive bladder) (596 51) (N32 81)   37  Obesity (278 00) (E66 9)   38  Pancreas, 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)   39  Paresthesia (782 0) (R20 2)   40  Prolapse of female pelvic organs (618 9) (N81 9)   41  Rectocele (618 04) (N81 6)   42  Screening for skin condition (V82 0) (Z13 89)   43  Slurred speech (784 59) (R47 81)   44  Syncope (780 2) (R55)   45  Transient ischemic attack (435 9) (G45 9)   46  Type 2 diabetes mellitus (250 00) (E11 9)   47  Type 2 diabetes mellitus with microalbuminuria (250 40,791 0) (E11 29,R80 9)   48  Urinary, incontinence, stress female (625 6) (N39 3)   49  Vagina bleeding (623 8) (N93 9)   50  Vitamin D deficiency (268 9) (E55 9)   51   Word finding difficulty (V40 1) (R47 89)    Past Medical History  1  History of Abdominal pain, LLQ (left lower quadrant) (789 04) (R10 32)   2  History of Abdominal tenderness, right upper quadrant (789 61) (R10 811)   3  History of Acute UTI (599 0) (N39 0)   4  History of Atypical chest pain (786 59) (R07 89)   5  History of Cut of finger (883 0) (S61 209A)   6  History of Foreign body in ear, left, initial encounter (436 2743) (T16 2XXA)   7  History of endometriosis (V13 29) (Z87 42)   8  History of hypercalcemia (V12 29) (Z86 39)   9  History of metrorrhagia (V13 29) (Z87 42)   10  History of syncope (V15 89) (Z87 898)   11  History of Hyponatremia (276 1) (E87 1)   12  History of Hyponatremia (276 1) (E87 1)   13  History of Microscopic hematuria (599 72) (R31 29)  Active Problems And Past Medical History Reviewed: The active problems and past medical history were reviewed and updated today  Surgical History    1  History of Cholecystectomy Laparoscopic   2  History of Hysterectomy   3  History of Oophorectomy - Unilateral (Removal Of One Ovary)  Surgical History Reviewed: The surgical history was reviewed and updated today  Family History  Mother    1  Family history of Anxiety   2  Family history of arthritis (V17 7) (Z82 61)   3  Family history of depression (V17 0) (Z81 8)   4  Family history of diabetes mellitus (V18 0) (Z83 3)   5  Family history of hypertension (V17 49) (Z82 49)   6  Family history of myocardial infarction (V17 3) (Z82 49)  Father    7  Family history of alcoholism (V17 0) (Z81 1)   8  Family history of hypertension (V17 49) (Z82 49)  Child    9  Family history of depression (V17 0) (Z81 8)  Daughter    8  Family history of Anxiety  Son    6  Family history of depression (V17 0) (Z81 8)  Sister    15  Family history of asthma (V17 5) (Z82 5)   13  Family history of diabetes mellitus (V18 0) (Z83 3)   14  Family history of malignant neoplasm of breast (V16 3) (Z80 3)   15   Family history of melanoma (V16 8) (Z80 8)  Maternal Grandmother    16  Family history of colon cancer (V16 0) (Z80 0)  Paternal Grandmother    16  Family history of colon cancer (V16 0) (Z80 0)  Maternal Aunt    25  Family history of arthritis (V17 7) (Z82 61)   19  Family history of depression (V17 0) (Z81 8)   20  Family history of diabetes mellitus (V18 0) (Z83 3)   21  Family history of hypertension (V17 49) (Z82 49)   22  Family history of malignant neoplasm of breast (V16 3) (Z80 3)  Paternal Aunt    21  Family history of arthritis (V17 7) (Z82 61)   24  Family history of diabetes mellitus (V18 0) (Z83 3)   25  Family history of hypertension (V17 49) (Z82 49)   26  Family history of malignant neoplasm of breast (V16 3) (Z80 3)  Maternal Uncle    32  Family history of diabetes mellitus (V18 0) (Z83 3)  Paternal Uncle    29  Family history of diabetes mellitus (V18 0) (Z83 3)  Cousin    34  Family history of arthritis (V17 7) (Z82 61)   30  Family history of hypertension (V17 49) (Z82 49)   31  Family history of substance abuse (V17 0) (Z81 4)  Family History Reviewed: The family history was reviewed and updated today  Social History     · Caffeine use (V49 89) (F15 90)   ·    · Never a smoker   · No drug use   · Social alcohol use (Z78 9)  Social History Reviewed: The social history was reviewed and updated today  The social history was reviewed and is unchanged  Current Meds   1  Accu-Chek Melisa Plus In Vitro Strip; TEST 4 TIMES A DAY; Therapy: 68Ycx6719 to (Evaluate:39Stg1150)  Requested for: 11Ufk1526; Last Rx:35Gec1575 Ordered   2  Atorvastatin Calcium 10 MG Oral Tablet; TAKE 1 TABLET DAILY; Therapy: (Recorded:15Non0943) to Recorded   3  BD Pen Needle Marina U/F 32G X 4 MM Miscellaneous; USE AS DIRECTED FOR INJECTING INSULIN- 2 TIMES DAILY; Therapy: 04ZBD7362 to [phone])  Requested for: 04Apr2017; Last Rx:04Apr2017 Ordered   4   Glucagon Emergency 1 MG Injection Kit; USE AS DIRECTED; Therapy: 75PYV8098 to (Last Rx:58Ywd5921)  Requested for: 70CME1201 Ordered   5  Jardiance 10 MG Oral Tablet; TAKE 1 TABLET BY MOUTH ONCE DAILY; Therapy: 08CEQ4251 to Recorded   6  Lantus SoloStar 100 UNIT/ML Subcutaneous Solution Pen-injector; INJECT 18 UNIT Daily; Therapy: (Recorded:17Oct2017) to Recorded   7  Levothyroxine Sodium 75 MCG Oral Tablet; take 1 tablet by mouth every day; Therapy: 44XFM6728 to (Evaluate:15Jan2018)  Requested for: 28TQW9125; Last Rx:17Oct2017 Ordered   8  Lisinopril 2 5 MG Oral Tablet; TAKE 1 TABLET BY MOUTH ONCE A DAY; Therapy: 28MII3615 to (Evaluate:22Apr2018)  Requested for: 27Apr2017; Last Rx:63Kqf0723 Ordered   9  MetFORMIN HCl  MG Oral Tablet Extended Release 24 Hour; Take 1 tablet twice daily; Therapy: (Recorded:10Aug2016) to Recorded   10  Oxybutynin Chloride 5 MG Oral Tablet; Therapy: (450 45 295) to Recorded   11  Pantoprazole Sodium 40 MG Oral Tablet Delayed Release; TAKE 1 TABLET DAILY; Therapy: 42WKV9923 to (Eugene Yoo)  Requested for: 28XYF4554; Last  Rx:07Nov2017 Ordered   12  Phentermine HCl - 37 5 MG Oral Tablet; Therapy: (450 45 295) to Recorded   13  Topamax 25 MG Oral Tablet; Therapy: (450 45 295) to Recorded   14  Ventolin  (90 Base) MCG/ACT Inhalation Aerosol Solution; USE 2 PUFFS EVERY  6 HOURS AS DIRECTED  Requested for: 70Rqc5852; Last YO:88HJL0077 Ordered   15  Victoza 18 MG/3ML Subcutaneous Solution Pen-injector; INJECT 1 8 MG Daily; Therapy: (Recorded:10Aug2016) to Recorded  Medication List Reviewed: The medication list was reviewed and updated today  Allergies  1  Cephalosporins   2  Norflex   3  Ultracet TABS   4  Rocephin SOLR   5   Sulfa Drugs    Vitals  Vital Signs    Recorded: 02BPT7915 08:20AM   Heart Rate 72   Systolic 363   Diastolic 70   Height 5 ft 3 in   Weight 166 lb 2 08 oz   BMI Calculated 29 43   BSA Calculated 1 79       Physical Exam   Constitutional  General appearance: No acute distress, well appearing and well nourished  Eyes  Conjunctiva and lids: No swelling, erythema or discharge  Ears, Nose, Mouth, and Throat  Oropharynx: Normal with no erythema, edema, exudate or lesions  Pulmonary  Respiratory effort: No increased work of breathing or signs of respiratory distress  Auscultation of lungs: Clear to auscultation  Cardiovascular  Auscultation of heart: Normal rate and rhythm, normal S1 and S2, without murmurs  Abdomen  Abdomen: Non-tender, no masses  Skin  Skin and subcutaneous tissue: Normal without rashes or lesions  Psychiatric  Orientation to person, place, and time: Normal          Future Appointments    Date/Time Provider Specialty Site   05/03/2018 11:00 AM STELLA Rob   Nephrology [address]   02/28/2018 10:30 AM Juanpablo Kellogg DO Internal Medicine [address]   Electronically signed by : Sonny Barragan, [facility]; Nov 29 2017  9:25AM EST                       (Author)    Electronically signed by : Ca Perez MD; Nov 29 2017  1:53PM EST                       (Author)

## 2017-12-05 ENCOUNTER — ALLSCRIPTS OFFICE VISIT (OUTPATIENT)
Dept: OTHER | Facility: OTHER | Age: 42
End: 2017-12-05

## 2017-12-06 NOTE — PROGRESS NOTES
Assessment    1  Type 2 diabetes mellitus (250 00) (E11 9)   2  Asthma, mild intermittent (493 90) (J45 20)    Plan  Asthma, mild intermittent    · Montelukast Sodium 10 MG Oral Tablet (Singulair); TAKE 1 TABLET AT BEDTIME   · Follow Up if Not Better Evaluation and Treatment  Follow-up  Status: Complete  Done: [id number]    Discussion/Summary  Discussion Summary:   1  Mild exacerbation of asthma will add generic singular to take at night 1-2 weeks use her Ventolin inhaler 2 puffs 4 times a day until cough has resolved exam did not reveal any wheezing uses her rescue inhaler has flare ups about 2 times per year will see her back if not improved may consider pulmonary function testsPatient with rectocele and cystocele going for surgery with Dr Long Medrano in [facility] is under control  Counseling Documentation With [facility]: The patient was counseled regarding diagnostic results,-- instructions for management,-- risk factor reductions,-- prognosis,-- impressions,-- risks and benefits of treatment options,-- importance of compliance with treatment  total time of encounter was minutes-- and-- minutes was spent counseling  Chief Complaint  Chief Complaint Free Text Note Form: Patient presents for f/u offers c/o wheezing, severe cough  History of Present Illness  HPI: 3 days cough, no fever wheezing using rescue 2 puffs bid mostly sx improved since moved from [place],flare ups asthma 2 times per year  surgery Dr Long Medrano      Review of Systems  Complete-Female:  Constitutional: No fever, no chills, feels well, no tiredness, no recent weight gain or weight loss  Eyes: No complaints of eye pain, no red eyes, no eyesight problems, no discharge, no dry eyes, no itching of eyes  ENT: no complaints of earache, no loss of hearing, no nose bleeds, no nasal discharge, no sore throat, no hoarseness    Cardiovascular: No complaints of slow heart rate, no fast heart rate, no chest pain, no palpitations, no leg claudication, no lower extremity edema  Respiratory: No complaints of shortness of breath, no wheezing, no cough, no SOB on exertion, no orthopnea, no PND  Gastrointestinal: as noted in HPI  Genitourinary: as noted in HPI  Musculoskeletal: No complaints of arthralgias, no myalgias, no joint swelling or stiffness, no limb pain or swelling  Integumentary: No complaints of skin rash or lesions, no itching, no skin wounds, no breast pain or lump  Decreased distal  Neurological: as noted in HPI  Active Problems  1  Abdominal pain, chronic, epigastric (680 17,599 12) (R10 13,G89 29)   2  Abnormal liver enzymes (790 5) (R74 8)   3  Abnormal mammogram (793 80) (R92 8)   4  Allergy to sulfa drugs (V14 2) (Z88 2)   5  Asthma, mild intermittent (493 90) (J45 20)   6  Benign essential hypertension (401 1) (I10)   7  Borderline abnormal thyroid function test (794 5) (R94 6)   8  Cystocele with uterine prolapse (618 4) (N81 4)   9  Depression (311) (F32 9)   10  Diabetes (250 00) (E11 9)   11  Diabetes mellitus with proteinuria (250 40,791 0) (E11 29,R80 9)   12  Dizziness (780 4) (R42)   13  Dysplastic nevi (216 9) (D23 9)   14  Elevated serum GGT level (790 5) (R74 8)   15  Elevated total protein (790 99) (R77 8)   16  Encounter for screening mammogram for malignant neoplasm of breast (V76 12) (Z12 31)   17  Flu vaccine need (V04 81) (Z23)   18  Frontal headache (784 0) (R51)   19  Headache (784 0) (R51)   20  Hematuria (599 70) (R31 9)   21  History of hypercalcemia (V12 29) (Z86 39)   22  History of metrorrhagia (V13 29) (Z87 42)   23  Hyperlipidemia, unspecified (272 4) (E78 5)   24  Hyperproteinemia (273 8) (E88 09)   25  Hypoglycemia (251 2) (E16 2)   26  Hypothyroidism (244 9) (E03 9)   27  Impacted cerumen of right ear (380 4) (H61 21)   28  Lactic acidosis (276 2) (E87 2)   29  Microscopic hematuria (599 72) (R31 29)   30  Multiple benign nevi (216 9) (D22 9)   31  Neuropathy, peripheral (356 9) (G62 9)   32   Night sweats (780  8) (R61)   33  Nonalcoholic fatty liver disease (571 8) (K76 0)   34  Numbness and tingling in right hand (782 0) (R20 0,R20 2)   35  Numbness in feet (782 0) (R20 0)   36  OAB (overactive bladder) (596 51) (N32 81)   37  Obesity (278 00) (E66 9)   38  Pancreas, 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)   39  Paresthesia (782 0) (R20 2)   40  Prolapse of female pelvic organs (618 9) (N81 9)   41  Rectocele (618 04) (N81 6)   42  Screening for skin condition (V82 0) (Z13 89)   43  Slurred speech (784 59) (R47 81)   44  Syncope (780 2) (R55)   45  Transient ischemic attack (435 9) (G45 9)   46  Type 2 diabetes mellitus (250 00) (E11 9)   47  Type 2 diabetes mellitus with microalbuminuria (250 40,791 0) (E11 29,R80 9)   48  Urinary, incontinence, stress female (625 6) (N39 3)   49  Vagina bleeding (623 8) (N93 9)   50  Vitamin D deficiency (268 9) (E55 9)   51  Word finding difficulty (V40 1) (R47 89)    Past Medical History  1  History of Abdominal pain, LLQ (left lower quadrant) (789 04) (R10 32)   2  History of Abdominal tenderness, right upper quadrant (789 61) (R10 811)   3  History of Acute UTI (599 0) (N39 0)   4  History of Atypical chest pain (786 59) (R07 89)   5  History of Cut of finger (883 0) (S61 209A)   6  History of Foreign body in ear, left, initial encounter (436 2543) (T16 2XXA)   7  History of endometriosis (V13 29) (Z87 42)   8  History of hypercalcemia (V12 29) (Z86 39)   9  History of metrorrhagia (V13 29) (Z87 42)   10  History of syncope (V15 89) (Z87 898)   11  History of Hyponatremia (276 1) (E87 1)   12  History of Hyponatremia (276 1) (E87 1)   13  History of Microscopic hematuria (599 72) (R35 28)  Active Problems And Past Medical History Reviewed: The active problems and past medical history were reviewed and updated today  Surgical History    1  History of Cholecystectomy Laparoscopic   2  History of Hysterectomy   3   History of Oophorectomy - Unilateral (Removal Of One Ovary)  Surgical History Reviewed: The surgical history was reviewed and updated today  Family History  Mother    1  Family history of Anxiety   2  Family history of arthritis (V17 7) (Z82 61)   3  Family history of depression (V17 0) (Z81 8)   4  Family history of diabetes mellitus (V18 0) (Z83 3)   5  Family history of hypertension (V17 49) (Z82 49)   6  Family history of myocardial infarction (V17 3) (Z82 49)  Father    7  Family history of alcoholism (V17 0) (Z81 1)   8  Family history of hypertension (V17 49) (Z82 49)  Child    9  Family history of depression (V17 0) (Z81 8)  Daughter    8  Family history of Anxiety  Son    6  Family history of depression (V17 0) (Z81 8)  Sister    15  Family history of asthma (V17 5) (Z82 5)   13  Family history of diabetes mellitus (V18 0) (Z83 3)   14  Family history of malignant neoplasm of breast (V16 3) (Z80 3)   15  Family history of melanoma (V16 8) (Z80 8)  Maternal Grandmother    12  Family history of colon cancer (V16 0) (Z80 0)  Paternal Grandmother    16  Family history of colon cancer (V16 0) (Z80 0)  Maternal Aunt    25  Family history of arthritis (V17 7) (Z82 61)   19  Family history of depression (V17 0) (Z81 8)   20  Family history of diabetes mellitus (V18 0) (Z83 3)   21  Family history of hypertension (V17 49) (Z82 49)   22  Family history of malignant neoplasm of breast (V16 3) (Z80 3)  Paternal Aunt    21  Family history of arthritis (V17 7) (Z82 61)   24  Family history of diabetes mellitus (V18 0) (Z83 3)   25  Family history of hypertension (V17 49) (Z82 49)   26  Family history of malignant neoplasm of breast (V16 3) (Z80 3)  Maternal Uncle    32  Family history of diabetes mellitus (V18 0) (Z83 3)  Paternal Uncle    29  Family history of diabetes mellitus (V18 0) (Z83 3)  Cousin    34  Family history of arthritis (V17 7) (Z82 61)   30  Family history of hypertension (V17 49) (Z82 49)   31   Family history of substance abuse (V17 0) (Z81 4)  Family History Reviewed: The family history was reviewed and updated today  Social History     · Caffeine use (V49 89) (F15 90)   ·    · Never a smoker   · No drug use   · Social alcohol use (Z78 9)  Social History Reviewed: The social history was reviewed and updated today  The social history was reviewed and is unchanged  Current Meds   1  Accu-Chek Melisa Plus In Vitro Strip; TEST 4 TIMES A DAY; Therapy: 79Nxn8114 to (Evaluate:71Edj1121)  Requested for: 66Ajt2409; Last Rx:24Gdm1324 Ordered   2  Atorvastatin Calcium 10 MG Oral Tablet; TAKE 1 TABLET DAILY; Therapy: (Recorded:10Aug2016) to Recorded   3  BD Pen Needle Marina U/F 32G X 4 MM Miscellaneous; USE AS DIRECTED FOR INJECTING INSULIN- 2 TIMES DAILY; Therapy: 58YEW0811 to (97 099227)  Requested for: 04Apr2017; Last Rx:04Apr2017 Ordered   4  Glucagon Emergency 1 MG Injection Kit; USE AS DIRECTED; Therapy: 80BSG5988 to (Last Rx:94Tip7531)  Requested for: 58LQZ4738 Ordered   5  Jardiance 10 MG Oral Tablet; TAKE 1 TABLET BY MOUTH ONCE DAILY; Therapy: 19FMP5685 to Recorded   6  Lantus SoloStar 100 UNIT/ML Subcutaneous Solution Pen-injector; INJECT 18 UNIT Daily; Therapy: (Recorded:17Oct2017) to Recorded   7  Levothyroxine Sodium 75 MCG Oral Tablet; take 1 tablet by mouth every day; Therapy: 28GQJ9195 to (Evaluate:15Jan2018)  Requested for: 73PUV0031; Last Rx:17Oct2017 Ordered   8  Lisinopril 2 5 MG Oral Tablet; TAKE 1 TABLET BY MOUTH ONCE A DAY; Therapy: 00OME1451 to (Evaluate:22Apr2018)  Requested for: 27Apr2017; Last Rx:27Apr2017 Ordered   9  MetFORMIN HCl  MG Oral Tablet Extended Release 24 Hour; Take 1 tablet twice daily; Therapy: (Recorded:10Aug2016) to Recorded   10  Oxybutynin Chloride 5 MG Oral Tablet; Therapy: (0472 51 11 42) to Recorded   11  Pantoprazole Sodium 40 MG Oral Tablet Delayed Release; TAKE 1 TABLET DAILY; Therapy: 28GEP6051 to (Dominique Prost)  Requested for: 61PUE9853; Last Rx:07Nov2017  Ordered   12   Phentermine HCl - 37 5 MG Oral Tablet; Therapy: (0472 51 11 42) to Recorded   13  Topamax 25 MG Oral Tablet; Therapy: (0472 51 11 42) to Recorded   14  Ventolin  (90 Base) MCG/ACT Inhalation Aerosol Solution; USE 2 PUFFS EVERY 6 HOURS  AS DIRECTED  Requested for: 07Sep2017; Last FP:90AXQ7583 Ordered   15  Victoza 18 MG/3ML Subcutaneous Solution Pen-injector; INJECT 1 8 MG Daily; Therapy: (Recorded:10Aug2016) to Recorded  Medication List Reviewed: The medication list was reviewed and updated today  Allergies  1  Cephalosporins   2  Norflex   3  Ultracet TABS   4  Rocephin SOLR   5  Sulfa Drugs    Vitals  Vital Signs    Recorded: 33PNO0897 09:45AM Recorded: 58WBO2927 09:31AM   Temperature  98 6 F   Heart Rate  96   Systolic 786, LUE, Sitting    Diastolic 65, LUE, Sitting    Height  5 ft 3 in   Weight  165 lb    BMI Calculated  29 23   BSA Calculated  1 78   O2 Saturation  99       Physical Exam   Constitutional  General appearance: Abnormal   appears healthy-- and-- overweight  Neck  Neck: Supple, symmetric, trachea midline, no masses  Thyroid: Normal, no thyromegaly  Pulmonary  Respiratory effort: No increased work of breathing or signs of respiratory distress  Auscultation of lungs: Clear to auscultation  Cardiovascular  Auscultation of heart: Normal rate and rhythm, normal S1 and S2, no murmurs  Carotid pulses: 2+ bilaterally  Examination of extremities for edema and/or varicosities: Normal    Abdomen  Abdomen: Non-tender, no masses  Liver and spleen: No hepatomegaly or splenomegaly  Skin Multiple dysplastic nevi on neck and back  Neurologic  Cranial nerves: Cranial nerves II-XII intact  Cortical function: Normal mental status  Reflexes: 2+ and symmetric  Sensation: No sensory loss  Coordination: Normal finger to nose and heel to shin  Psychiatric  Judgment and insight: Normal    Orientation to person, place, and time: Normal    Recent and remote memory: Intact     Mood and affect: Normal        Future Appointments    Date/Time Provider Specialty Site   05/03/2018 11:00 AM STELLA Burks   Nephrology [address] [facility]   02/28/2018 10:30 AM Rubi Martines DO Internal Medicine [facility] [address]       Signatures   Electronically signed by : Prince Dionicio DO; Dec  5 2017  9:55AM EST                       (Author)

## 2018-01-09 NOTE — PROCEDURES
Results/Data    Procedure: Electromyogram and Nerve Conduction Study  Indication: Left Upper and Lower Extremity   Referred by Dr Adalgisa Hill   The procedure's were discussed with the patient  Written consent was obtained prior to the procedure and is detailed in the patient's record  Prior to the start of the procedure a time out was taken and the identity of the patient was confirmed via name and date of birth with the patient  The correct site and the procedure to be performed were confirmed  The correct side was confirmed if applicable  The positioning of the patient was verified  The availability of the correct equipment was verified  Procedure Start Time: 9:00    Technique: A sterile concentric needle electrode was used  The patient tolerated the procedure well  There were no complications  Results EMG LEFT UPPER/LOWER EXTREMITY    Motor and sensory conduction studies were performed on the left median, ulnar, peroneal, tibial and sural nerves  The distal motor latencies were normal  The motor action potential amplitudes were normal  Motor conduction velocity of the peroneal nerve below the fibular head was slow at 41 m/s  The other motor conduction velocities were normal including conduction velocity of the ulnar nerve across the elbow and peroneal nerve across the fibular head  Peroneal F wave was prolonged while the other F waves were normal     The left median, ulnar and sural distal sensory latencies were normal with normal palmar conduction with median stimulation  Action potential amplitudes were normal     H  reflexes were not obtainable  Concentric needle EMG was performed in the left APB, FDI, EDC, brachial radialis, biceps, triceps, EDB, tibialis anterior, gastrocnemius medius, vastus lateralis, biceps femoralis short head in the  low cervical and lumbar paraspinal regions  There was no evidence of spontaneous activity seen   The compound motor unit potentials were of normal configuration and interference patterns were full were full for effort  IMPRESSION: This is an abnormal EMG of the left upper and lower extremity due to a localized neuropathic process involving the left peroneal nerve between the fibular head and ankle with predominantly demyelinative change  A more diffuse small fiber peripheral neuropathy cannot be excluded  STELLA Adler        Signatures   Electronically signed by : Tremayne Guadarrama MD; Aug 18 2016  9:51AM EST                       (Author)

## 2018-01-11 NOTE — PROCEDURES
Assessment    1  Microscopic hematuria (599 72) (R31 29)   2  Urinary, incontinence, stress female (625 6) (N39 3)   3  OAB (overactive bladder) (596 51) (N32 81)    Plan  Depression, Health Maintenance, Foreign body in ear, left, initial encounter, Impacted  cerumen of right ear    · Debrox 6 5 % Otic Solution   Rx By: Yvonne Evans; Dispense: 0 Days ; #:15 ML; Refill: 0; For: Depression, Health Maintenance, Foreign body in ear, left, initial encounter, Impacted cerumen of right ear; ANIBAL = N; Record; Last Updated By: Nury De Leon; 3/9/2017 2:16:26 PM  OAB (overactive bladder)    · Myrbetriq 25 MG Oral Tablet Extended Release 24 Hour; Take 1 tablet daily   Rx By: Bri Wilkes; Dispense: 30 Days ; #:30 Tablet Extended Release 24 Hour; Refill: 6; For: OAB (overactive bladder); ANIBAL = N; Sent To: [facility]/PHARMACY #[id number]  · Schedule Surgery Treatment  Procedure: cysto bladder biopsy  miranda    no med  clearance needed  Status: Hold For - Scheduling  Requested for: 63BXZ5033   Ordered; For: OAB (overactive bladder); Ordered By: Bri Wilkes Performed:  Due: 14BSQ6490    Discussion/Summary  Discussion Summary:   Asif Flores is a 42-year-old female with microscopic hematuria, mixed urinary incontinence as well as an abnormal lesion present on cystoscopy by the trigone of the bladder near the left ureteral orifice  We discussed management options for the bladder lesion  I recommended proceeding to the operating room for a bladder biopsy  We discussed that this is the standard procedure and is both diagnostic, and therapeutic  We discussed the procedure in detail and the normal postoperative course was presented  The patient understood the main risks to be bleeding, infection, bladder perforation, and the need for a catheter or stent  The patient asked questions and all of them were answered        After this discussion the patient signed informed consented to Bladder biopsy This will be scheduled in the near future  We will address her mixed urinary incontinence by focusing on the urge component first  I have prescribed a beta 3 agonists and given samples  Assuming the biopsy is negative, once her urgency symptoms are improved with the medication we will discuss a mid urethral sling  I suspect that this will be required in order to fully treat her incontinence and she is a good candidate for this, once her bladder overactivity can be treated medically first       Chief Complaint  Chief Complaint Free Text Note Form: Patient presents for cysto h/o microhematuria and urinary urgency  History of Present Illness  HPI: Mrs Kirit Gaines returns in consultation for microscopic hematuria and mixed urinary incontinence  She is completed a contrasted CT scan which is negative for any upper tract lesions  She presents today for cystoscopy having previously been counseled by me on the procedure in detail  She continues to have significant mixed urinary incontinence, leaking with cough Leverson these requiring pads  She also has bothersome urinary frequency and urgency and occasional urge urinary incontinence  Review of Systems  Complete-Female Urology:   Constitutional: No fever, no chills, feels well, no tiredness, no recent weight gain or weight loss  Respiratory: No complaints of shortness of breath, no wheezing, no cough, no SOB on exertion, no orthopnea, no PND  Cardiovascular: No complaints of slow heart rate, no fast heart rate, no chest pain, no palpitations, no leg claudication, no lower extremity edema  Gastrointestinal: No complaints of abdominal pain, no constipation, no nausea or vomiting, no diarrhea, no bloody stools  Genitourinary: feelings of urinary urgency, Empty sensation, incontinence, nocturia and stream quality good, but as noted in HPI, no dysuria, no urinary hesitancy and no hematuria     Musculoskeletal: No complaints of arthralgias, no myalgias, no joint swelling or stiffness, no limb pain or swelling  Integumentary: No complaints of skin rash or lesions, no itching, no skin wounds, no breast pain or lump  Hematologic/Lymphatic: No complaints of swollen glands, no swollen glands in the neck, does not bleed easily, does not bruise easily  Neurological: No complaints of headache, no confusion, no convulsions, no numbness, no dizziness or fainting, no tingling, no limb weakness, no difficulty walking  Active Problems    1  Abdominal pain, LLQ (left lower quadrant) (789 04) (R10 32)   2  Abdominal tenderness, right upper quadrant (789 61) (R10 811)   3  Abnormal mammogram (793 80) (R92 8)   4  Acute UTI (599 0) (N39 0)   5  Allergy to sulfa drugs (V14 2) (Z88 2)   6  Asthma, mild intermittent (493 90) (J45 20)   7  Atypical chest pain (786 59) (R07 89)   8  Benign essential hypertension (401 1) (I10)   9  Borderline abnormal thyroid function test (794 5) (R94 6)   10  Depression (311) (F32 9)   11  Diabetes (250 00) (E11 9)   12  Dizziness (780 4) (R42)   13  Dysplastic nevi (216 9) (D23 9)   14  Elevated ALT measurement (790 4) (R74 0)   15  Encounter for screening mammogram for malignant neoplasm of breast (V76 12)    (Z12 31)   16  Flu vaccine need (V04 81) (Z23)   17  Foreign body in ear, left, initial encounter (436 2743) (T16 2XXA)   18  Frontal headache (784 0) (R51)   19  Headache (784 0) (R51)   20  Hematuria (599 70) (R31 9)   21  History of metrorrhagia (V13 29) (Z87 42)   22  Hypercalcemia (275 42) (E83 52)   23  Hyperlipidemia, unspecified (272 4) (E78 5)   24  Hyperproteinemia (273 8) (E88 09)   25  Hypoglycemia (251 2) (E16 2)   26  Hypothyroidism (244 9) (E03 9)   27  Impacted cerumen of right ear (380 4) (H61 21)   28  Lactic acidosis (276 2) (E87 2)   29  Microscopic hematuria (599 72) (R31 29)   30  Multiple benign nevi (216 9) (D22 9)   31  Neuropathy, peripheral (356 9) (G62 9)   32  Night sweats (780 8) (R61)   33   Nonalcoholic fatty liver disease (571 8) (K76 0)   34  Numbness and tingling in right hand (782 0) (R20 2)   35  Numbness in feet (782 0) (R20 0)   36  Obesity (278 00) (E66 9)   37  Pancreas, 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)   38  Paresthesia (782 0) (R20 2)   39  Proteinuria (791 0) (R80 9)   40  Screening for skin condition (V82 0) (Z13 89)   41  Slurred speech (784 59) (R47 81)   42  Syncope (780 2) (R55)   43  Transient ischemic attack (435 9) (G45 9)   44  Type 2 diabetes mellitus (250 00) (E11 9)   45  Type 2 diabetes mellitus with microalbuminuria (250 40,791 0) (E11 29,R80 9)   46  Urinary, incontinence, stress female (625 6) (N39 3)   47  Vagina bleeding (623 8) (N93 9)   48  Vitamin D deficiency (268 9) (E55 9)   49  Word finding difficulty (V40 1) (R47 89)    Past Medical History    1  History of Cut of finger (883 0) (S61 209A)   2  History of endometriosis (V13 29) (Z87 42)   3  History of metrorrhagia (V13 29) (Z87 42)   4  History of syncope (V15 89) (Z87 898)   5  History of Hyponatremia (276 1) (E87 1)   6  History of Hyponatremia (276 1) (E87 1)   7  History of Microscopic hematuria (599 72) (R31 29)  Active Problems And Past Medical History Reviewed: The active problems and past medical history were reviewed and updated today  Surgical History    1  History of Hysterectomy   2  History of Oophorectomy - Unilateral (Removal Of One Ovary)  Surgical History Reviewed: The surgical history was reviewed and updated today  Family History  Mother    1  Family history of arthritis (V17 7) (Z82 61)   2  Family history of diabetes mellitus (V18 0) (Z83 3)   3  Family history of hypertension (V17 49) (Z82 49)   4  Family history of myocardial infarction (V17 3) (Z82 49)  Father    5  Family history of hypertension (V17 49) (Z82 49)  Child    6  Family history of depression (V17 0) (Z81 8)  Daughter    9  Family history of Anxiety  Sister    8  Family history of asthma (V17 5) (Z82 5)   9   Family history of diabetes mellitus (V18 0) (Z83 3) 10  Family history of malignant neoplasm of breast (V16 3) (Z80 3)   11  Family history of melanoma (V16 8) (Z80 8)  Maternal Grandmother    15  Family history of colon cancer (V16 0) (Z80 0)  Paternal Grandmother    15  Family history of colon cancer (V16 0) (Z80 0)  Maternal Aunt    15  Family history of arthritis (V17 7) (Z82 61)   15  Family history of diabetes mellitus (V18 0) (Z83 3)   16  Family history of hypertension (V17 49) (Z82 49)   17  Family history of malignant neoplasm of breast (V16 3) (Z80 3)  Paternal Aunt    25  Family history of arthritis (V17 7) (Z82 61)   19  Family history of diabetes mellitus (V18 0) (Z83 3)   20  Family history of hypertension (V17 49) (Z82 49)   21  Family history of malignant neoplasm of breast (V16 3) (Z80 3)  Maternal Uncle    25  Family history of diabetes mellitus (V18 0) (Z83 3)  Paternal Uncle    21  Family history of diabetes mellitus (V18 0) (Z83 3)  Cousin    25  Family history of arthritis (V17 7) (Z82 61)   25  Family history of hypertension (V17 49) (Z82 49)  Family History Reviewed: The family history was reviewed and updated today  Social History    · Caffeine use (V49 89) (F15 90)   ·    · Never a smoker   · No drug use   · Social alcohol use (Z78 9)  Social History Reviewed: The social history was reviewed and updated today  Current Meds   1  Aspirin 81 MG TABS; Take 1 tablet daily; Therapy: (Recorded:24Jan2017) to Recorded   2  Atorvastatin Calcium 10 MG Oral Tablet; TAKE 1 TABLET DAILY; Therapy: (Recorded:10Aug2016) to Recorded   3  Debrox 6 5 % Otic Solution; Instill 5-10 drops twice daily for up to 4 days; Therapy: 39BXY8192 to (Last Rx:27Jan2017) Ordered   4  Glimepiride 2 MG Oral Tablet; TAKE 1 TABLET DAILY AS DIRECTED; Therapy: (Recorded:10Aug2016) to Recorded   5  Glucagon Emergency 1 MG Injection Kit; USE AS DIRECTED; Therapy: 88WJI0622 to (Last Rx:62Hul5088)  Requested for: 85FHI9549 Ordered   6   Erasmo Schofield 100 UNIT/ML Subcutaneous Solution Pen-injector; INJECT 28 UNIT   Daily; Therapy: (Shiva Gomez) to Recorded   7  Levothyroxine Sodium 75 MCG Oral Tablet; TAKE 1 TABLET DAILY; Therapy: 52ONI4388 to (Evaluate:04Oct2016)  Requested for: 16FBL2922; Last   Rx:49Mek5624 Ordered   8  Lisinopril 5 MG Oral Tablet; TAKE 1 TABLET DAILY; Therapy: 84RDC6946 to (Evaluate:12Mar2017)  Requested for: 04Grd6304; Last   Rx:36Nzj7936 Ordered   9  MetFORMIN HCl  MG Oral Tablet Extended Release 24 Hour; Take 1 tablet twice   daily; Therapy: (Recorded:88Lvk4445) to Recorded   10  Ventolin  (90 Base) MCG/ACT Inhalation Aerosol Solution; As needed; Therapy: (Recorded:38Suq4601) to Recorded   11  Victoza 18 MG/3ML Subcutaneous Solution Pen-injector; INJECT 1 8 MG Daily; Therapy: (Recorded:22Ezx9929) to Recorded   12  Vitamin D3 5000 UNIT Oral Capsule; TAKE AS DIRECTED; Therapy: 53QJC0246 to (Last Rx:89Cqn6442)  Requested for: 21ZBP6465 Ordered  Medication List Reviewed: The medication list was reviewed and updated today  Allergies    1  Cephalosporins   2  Norflex   3  Ultracet TABS   4  Rocephin SOLR   5  Sulfa Drugs    Vitals  Vital Signs    Recorded: 67HQI8354 74:53IE   Systolic 308   Diastolic 80   Height 5 ft 3 in   Weight 177 lb    BMI Calculated 31 35   BSA Calculated 1 84     Physical Exam    Constitutional   General appearance: No acute distress, well appearing and well nourished  Pulmonary   Respiratory effort: No increased work of breathing or signs of respiratory distress  Cardiovascular   Examination of extremities for edema and/or varicosities: Normal     Abdomen   Abdomen: Non-tender, no masses  Genitourinary Negative CVA tenderness  Low back pain tender to palpation on the right  Musculoskeletal   Gait and station: Normal     Skin   Skin and subcutaneous tissue: Normal without rashes or lesions  Neurologic   Cranial nerves: Cranial nerves 2-12 intact  Results/Data  Urine Dip Non-Automated- POC 47UIX8542 02:22PM Mammshannen Rump     Test Name Result Flag Reference   Color Yellow     Clarity Transparent     Leukocytes neg     Nitrite neg     Blood large     Bilirubin neg     Protein large     Ph 6 0     Specific Gravity 1 010     Ketone neg     Glucose large         Procedure    Procedure: Diagnostic cystourethroscopy  Indications for the procedure include hematuria  Risks, benefits, alternatives, risk of bleeding, infection risks and possible injury to the urethra, bladder and/or ureters were discussed with the patient  Consent was obtained prior to the procedure and is detailed in the patient's record  Anesthesia: the cystoscope was lubricated with 2% lidocaine gel  Procedure Note:     The patient was prepped and draped in the usual sterile fashion using betadine  The periurethral area was exposed and a lubricated 16 [place] flexible cystoscope was introduced into the urethral meatus  The urethra was normal  The cystoscope was advanced to the urethrovesical junction and the bladder was distended with saline  All regions of the bladder were systematically inspected and the bladder appeared abnormal and There is some abnormal-appearing urothelium overlying the left ureteral orifice  This may be nonspecific trigonitis but carcinoma in situ is not excluded  Biopsies required  Post-Procedure: The bladder was drained and the cystoscope was removed      Future Appointments    Date/Time Provider Specialty Site   04/27/2017 10:00 AM STELLA Bae  Nephrology [address]   04/04/2017 09:30 AM Trinity Evans DO Internal Medicine [facility] INTERNAL MED Steven Parra   11/16/2017 09:15 AM STELLA Metcalf   Dermatology [facility]     Signatures   Electronically signed by : Kathy Flores MD; Mar  9 2017  2:51PM EST                       (Author)

## 2018-01-13 VITALS
OXYGEN SATURATION: 98 % | HEART RATE: 85 BPM | SYSTOLIC BLOOD PRESSURE: 130 MMHG | HEIGHT: 63 IN | WEIGHT: 180 LBS | DIASTOLIC BLOOD PRESSURE: 78 MMHG | TEMPERATURE: 98.4 F | BODY MASS INDEX: 31.89 KG/M2

## 2018-01-13 VITALS
SYSTOLIC BLOOD PRESSURE: 90 MMHG | HEART RATE: 88 BPM | WEIGHT: 178.5 LBS | DIASTOLIC BLOOD PRESSURE: 60 MMHG | RESPIRATION RATE: 16 BRPM | HEIGHT: 63 IN | TEMPERATURE: 98 F | BODY MASS INDEX: 31.63 KG/M2

## 2018-01-13 VITALS
HEIGHT: 63 IN | SYSTOLIC BLOOD PRESSURE: 124 MMHG | BODY MASS INDEX: 31.36 KG/M2 | DIASTOLIC BLOOD PRESSURE: 80 MMHG | WEIGHT: 177 LBS

## 2018-01-13 VITALS
WEIGHT: 176 LBS | RESPIRATION RATE: 16 BRPM | HEART RATE: 74 BPM | HEIGHT: 64 IN | SYSTOLIC BLOOD PRESSURE: 92 MMHG | DIASTOLIC BLOOD PRESSURE: 70 MMHG | BODY MASS INDEX: 30.05 KG/M2

## 2018-01-13 VITALS
BODY MASS INDEX: 30.73 KG/M2 | HEART RATE: 80 BPM | DIASTOLIC BLOOD PRESSURE: 64 MMHG | TEMPERATURE: 97.7 F | WEIGHT: 180 LBS | SYSTOLIC BLOOD PRESSURE: 110 MMHG | HEIGHT: 64 IN | OXYGEN SATURATION: 97 %

## 2018-01-13 VITALS
HEIGHT: 63 IN | DIASTOLIC BLOOD PRESSURE: 70 MMHG | HEART RATE: 72 BPM | SYSTOLIC BLOOD PRESSURE: 112 MMHG | WEIGHT: 166.13 LBS | BODY MASS INDEX: 29.44 KG/M2

## 2018-01-13 VITALS
BODY MASS INDEX: 30.01 KG/M2 | WEIGHT: 169.38 LBS | HEIGHT: 63 IN | DIASTOLIC BLOOD PRESSURE: 84 MMHG | HEART RATE: 84 BPM | SYSTOLIC BLOOD PRESSURE: 130 MMHG

## 2018-01-13 NOTE — RESULT NOTES
Verified Results  (1) CARL SCREEN W/REFLEX TO TITER/PATTERN 66EVN6867 10:56AM [employer] Order Number: [id number]     Test Name Result Flag Reference   CARL SCREEN  Negative  Negative     (1) CHRONIC HEPATITIS PANEL 60UML8084 10:56AM [employer] Order Number: [id number]     Test Name Result Flag Reference   HEPATITIS B SURFACE ANTIGEN Non-reactive  Non-reactive, NonReactive - Confirmed   HEPATITIS C ANTIBODY Non-reactive  Non-reactive   HEPATITIS B CORE IGM ANTIBODY Non-reactive  Non-reactive   HEPATITIS B CORE TOTAL ANTIBODY Non-reactive  Non-reactive     (1) COMPREHENSIVE METABOLIC PANEL 83LCF0583 22:27YN [employer] Order Number: [id number]     Test Name Result Flag Reference   SODIUM 136 mmol/L  136-145   POTASSIUM 3 9 mmol/L  3 5-5 3   CHLORIDE 106 mmol/L  100-108   CARBON DIOXIDE 22 mmol/L  21-32   ANION GAP (CALC) 8 mmol/L  4-13   BLOOD UREA NITROGEN 13 mg/dL  5-25   CREATININE 0 68 mg/dL  0 60-1 30   Standardized to IDMS reference method   CALCIUM 10 4 mg/dL H 8 3-10 1   BILI, TOTAL 0 38 mg/dL  0 20-1 00   ALK PHOSPHATAS 112 U/L     ALT (SGPT) 42 U/L  12-78   Specimen collection should occur prior to Sulfasalazine and/or Sulfapyridine administration due to the potential for falsely depressed results  AST(SGOT) 13 U/L  5-45   Specimen collection should occur prior to Sulfasalazine administration due to the potential for falsely depressed results  ALBUMIN 4 3 g/dL  3 5-5 0   TOTAL PROTEIN 8 7 g/dL H 6 4-8 2   CORRECTED CALCIUM 10 2 mg/dL H 8 3-10 1   eGFR 109 ml/min/1 73sq m     [facility] Disease Education Program recommendations are as follows:  GFR calculation is accurate only with a steady state creatinine  Chronic Kidney disease less than 60 ml/min/1 73 sq  meters  Kidney failure less than 15 ml/min/1 73 sq  meters     GLUCOSE FASTING 108 mg/dL H 65-99   Specimen collection should occur prior to Sulfasalazine administration due to the potential for falsely depressed results  Specimen collection should occur prior to Sulfapyridine administration due to the potential for falsely elevated results  (1) FERRITIN 07PRA6389 10:56[facility] Order Number: [id number]     Test Name Result Flag Reference   FERRITIN 71 ng/mL  8-388     (1) IRON SATURATION %, TIBC 43NTV9995 10:56[facility] Order Number: [id number]     Test Name Result Flag Reference   IRON SATURATION 17 %     TOTAL IRON BINDING CAPACITY 332 ug/dL  250-450   IRON 55 ug/dL     Patients treated with metal-binding drugs (ie  Deferoxamine) may have depressed iron values  (1) TSH 91SBL7414 10:56[facility] Order Number: [id number]     Test Name Result Flag Reference   TSH 2 760 uIU/mL  0 358-3 740   Patients undergoing fluorescein dye angiography may retain small amounts of fluorescein in the body for 48-72 hours post procedure  Samples containing fluorescein can produce falsely depressed TSH values  If the patient had this procedure,a specimen should be resubmitted post fluorescein clearance  The recommended reference ranges for TSH during pregnancy are as follows:  First trimester 0 1 to 2 5 uIU/mL  Second trimester  0 2 to 3 0 uIU/mL  Third trimester 0 3 to 3 0 uIU/m       Plan  Abnormal liver enzymes    · (1) FREE LIGHT CHAINS, SERUM; Status:Active; Requested for:08Nov2017;    · PTH, INTACT WITHOUT CALCIUM; Status:Active;  Requested FLW:59YDU5247;

## 2018-01-14 VITALS
DIASTOLIC BLOOD PRESSURE: 80 MMHG | BODY MASS INDEX: 31.89 KG/M2 | SYSTOLIC BLOOD PRESSURE: 122 MMHG | HEIGHT: 63 IN | WEIGHT: 180 LBS

## 2018-01-14 VITALS
TEMPERATURE: 98.7 F | BODY MASS INDEX: 31.27 KG/M2 | OXYGEN SATURATION: 98 % | SYSTOLIC BLOOD PRESSURE: 140 MMHG | HEART RATE: 100 BPM | HEIGHT: 63 IN | WEIGHT: 176.5 LBS | DIASTOLIC BLOOD PRESSURE: 70 MMHG

## 2018-01-15 NOTE — MISCELLANEOUS
Discussion/Summary  Discussion Summary:   1 patient with syncope 6 or 7 times has not checked her blood sugar when she's had these symptoms we'll refer her to Dr Kashif Nj as well as Dr Javed Hodge to rule out neurologic and cardiac causes patient was given a glucometer importance of checking her blood sugar regularly stressed especially after any syncopal episodes to check her blood sugars 2 times per day but at different times of the day to get her blood sugars before every meal 2 hours after every meal and at bedtime to call me with any blood sugars less than 80 her A1c is 9 5, mild LFT abnormalities at [facility] continue to hold metformin will add Victoza and increase her Lantus to 15 units a day importance of a snack before bedtime stressed  2 hypertension is at goal  3 hypothyroidism TSH is elevated we'll increase levothyroxine to 75 Âµg recheck in 2 months  4 hyponatremia etiology uncertain we'll check blood work and urine now  5 patient with a UTI with Escherichia coli was on Cipro for 2 days with microscopic hematuria we'll recheck urine culture  6 hyperlipidemia her LDL was at goal at [facility] triglycerides were elevated  7 increased LFTs to avoid alcohol recheck in 2 months may consider ultrasound of the liver  slurred speech will check mra of brain, has not had any repeat symptoms possible TIA Dr Beatrice Corado help will be appreciated  We'll be referring her to diabetic education as well as dietitian  Due to syncopal events patient was advised not to drive until no syncopal events for 6 months  Counseling Documentation With Imm: The patient was counseled regarding diagnostic results, instructions for management, risk factor reductions, impressions, risks and benefits of treatment options, importance of compliance with treatment  total time of encounter was 40 minutes and 25 minutes was spent counseling     Medication SE Review and Pt Understands Tx: Possible side effects of new medications were reviewed with the patient/guardian today  The treatment plan was reviewed with the patient/guardian  The patient/guardian understands and agrees with the treatment plan      Chief Complaint  Chief Complaint Free Text Note Form: pt presents for f/u hospital      History of Present Illness  TCM Communication [facility] 8173 records were reviewed  She was hospitalized at [facility]  The date of admission: 06/30/2016, date of discharge: 07/01/2016  Diagnosis: b/s  She was discharged to home  Symptoms: dizziness and headache  Counseling was provided to the patient  Topics counseled included importance of compliance with treatment  Communication performed and completed by man   HPI: 3 weeks ago syncope 3 times episodes of slurred speech did not check her blood sugar at that time her A1c was elevated at the hospital her blood sugars by checking her, and her with her sisters has been 2 to 300s over the past several weeks      Review of Systems  Complete-Female:   Constitutional: No fever, no chills, feels well, no tiredness, no recent weight gain or weight loss  Eyes: No complaints of eye pain, no red eyes, no eyesight problems, no discharge, no dry eyes, no itching of eyes  ENT: no complaints of earache, no loss of hearing, no nose bleeds, no nasal discharge, no sore throat, no hoarseness  Cardiovascular: No complaints of slow heart rate, no fast heart rate, no chest pain, no palpitations, no leg claudication, no lower extremity edema  Respiratory: No complaints of shortness of breath, no wheezing, no cough, no SOB on exertion, no orthopnea, no PND  Gastrointestinal: as noted in HPI  Genitourinary: as noted in HPI  Musculoskeletal: No complaints of arthralgias, no myalgias, no joint swelling or stiffness, no limb pain or swelling  Integumentary: No complaints of skin rash or lesions, no itching, no skin wounds, no breast pain or lump  Neurological: as noted in HPI        Active Problems    1  Abdominal pain, LLQ (left lower quadrant) (789 04) (R10 32)   2  Abdominal tenderness, right upper quadrant (789 61) (R10 811)   3  Acute UTI (599 0) (N39 0)   4  Asthma, mild intermittent (493 90) (J45 20)   5  Atypical chest pain (786 59) (R07 89)   6  Benign essential hypertension (401 1) (I10)   7  Borderline abnormal thyroid function test (794 5) (R94 6)   8  Cut of finger (883 0) (S61 209A)   9  Diabetes (250 00) (E11 9)   10  Dizziness (780 4) (R42)   11  Dysplastic nevi (216 9) (D23 9)   12  Encounter for screening mammogram for malignant neoplasm of breast (V76 12)    (Z12 31)   13  Flu vaccine need (V04 81) (Z23)   14  Hyperglycemia (790 29) (R73 9)   15  Hyperlipidemia, unspecified (272 4) (E78 5)   16  Hyperproteinemia (273 8) (E88 09)   17  Hypoglycemia (251 2) (E16 2)   18  Hyponatremia (276 1) (E87 1)   19  Hypothyroidism (244 9) (E03 9)   20  Lactic acidosis (276 2) (E87 2)   21  Metrorrhagia (626 6) (N92 1)   22  Night sweats (780 8) (R61)   23  Nonalcoholic fatty liver disease (571 8) (K76 0)   24  Numbness and tingling in right hand (782 0) (R20 2)   25  Numbness in feet (782 0) (R20 0)   26  Obesity (278 00) (E66 9)   27  Pancreas, 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)   28  Slurred speech (784 59) (R47 81)   29  Vagina bleeding (623 8) (N93 9)   30  Vitamin D deficiency (268 9) (E55 9)    Surgical History    1  History of Hysterectomy  Surgical History Reviewed: The surgical history was reviewed and updated today  Family History  Mother    1  Family history of arthritis (V17 7) (Z82 61)   2  Family history of diabetes mellitus (V18 0) (Z83 3)   3  Family history of hypertension (V17 49) (Z82 49)   4  Family history of myocardial infarction (V17 3) (Z82 49)  Father    5  Family history of hypertension (V17 49) (Z82 49)  Sister    10  Family history of asthma (V17 5) (Z82 5)   7  Family history of diabetes mellitus (V18 0) (Z83 3)   8   Family history of malignant neoplasm of breast (V16 3) (Z80 3)   9  Family history of melanoma (V16 8) (Z80 8)  Maternal Grandmother    10  Family history of colon cancer (V16 0) (Z80 0)  Paternal Grandmother    6  Family history of colon cancer (V16 0) (Z80 0)  Maternal Aunt    12  Family history of arthritis (V17 7) (Z82 61)   13  Family history of diabetes mellitus (V18 0) (Z83 3)   14  Family history of hypertension (V17 49) (Z82 49)   15  Family history of malignant neoplasm of breast (V16 3) (Z80 3)  Paternal Aunt    12  Family history of arthritis (V17 7) (Z82 61)   17  Family history of diabetes mellitus (V18 0) (Z83 3)   18  Family history of hypertension (V17 49) (Z82 49)   19  Family history of malignant neoplasm of breast (V16 3) (Z80 3)  Maternal Uncle    20  Family history of diabetes mellitus (V18 0) (Z83 3)  Paternal Uncle    24  Family history of diabetes mellitus (V18 0) (Z83 3)  Cousin    25  Family history of arthritis (V17 7) (Z82 61)   23  Family history of hypertension (V17 49) (Z82 49)  Family History Reviewed: The family history was reviewed and updated today  Social History    · Caffeine use (V49 89) (F15 90)   · Never a smoker   · Social alcohol use (Z78 9)  Social History Reviewed: The social history was reviewed and updated today  The social history was reviewed and is unchanged  Current Meds   1  Aspirin 81 MG Oral Tablet Delayed Release; take 2 tablet daily; Therapy: 74SDO6857 to Recorded   2  Levothyroxine Sodium 50 MCG Oral Tablet; TAKE 1 TABLET DAILY AS DIRECTED; Therapy: 15LMG8144 to (Chrissy Prophet)  Requested for: 51ZDN0803; Last   Rx:[id number] Ordered   3  Lisinopril 5 MG Oral Tablet; TAKE 1 TABLET DAILY; Therapy: 92ALP5273 to (Chrissy Prophet)  Requested for: 61Ntm9215; Last   Rx:[id number] Ordered   4  Omega 3 1200 MG Oral Capsule; Take 1 capsule twice daily; Therapy: 75ZIE1118 to (Mykel Waite)  Requested for: 47PXX9112; Last   Rx:[id number] Ordered   5   ProAir  (90 Base) MCG/ACT Inhalation Aerosol Solution; INHALE 1 TO 2 PUFFS   EVERY 4 TO 6 HOURS AS NEEDED  Requested for: 41WJS4865; Last Rx:[id number]   Ordered   6  Simvastatin 5 MG Oral Tablet; TAKE 1 TABLET DAILY AT BEDTIME; Therapy: 78TUX6012 to (Baby Ralph)  Requested for: 25FCU1271; Last   Rx:[id number]; Status: ACTIVE - Renewal Denied Ordered  Medication List Reviewed: The medication list was reviewed and updated today  Allergies    1  Norflex   2  Sulfonamide Derivatives   3  Ultracet TABS    Physical Exam    Constitutional   General appearance: Abnormal   appears healthy and overweight  Neck   Neck: Supple, symmetric, trachea midline, no masses  Thyroid: Normal, no thyromegaly  Pulmonary   Respiratory effort: No increased work of breathing or signs of respiratory distress  Auscultation of lungs: Clear to auscultation  Cardiovascular   Auscultation of heart: Normal rate and rhythm, normal S1 and S2, no murmurs  Carotid pulses: 2+ bilaterally  Examination of extremities for edema and/or varicosities: Normal     Abdomen   Abdomen: Non-tender, no masses  Liver and spleen: No hepatomegaly or splenomegaly  Neurologic   Cranial nerves: Cranial nerves II-XII intact  Cortical function: Normal mental status  Reflexes: 2+ and symmetric  Sensation: No sensory loss  Coordination: Normal finger to nose and heel to shin  Psychiatric   Judgment and insight: Normal     Orientation to person, place, and time: Normal     Recent and remote memory: Intact      Mood and affect: Normal        Signatures   Electronically signed by : Gustavo Goncalves, ; Jul 5 2016 12:58PM EST                       (Author)    Electronically signed by : Aidee Garland DO; Jul 6 2016  4:39PM EST                       (Author)

## 2018-01-17 NOTE — PROCEDURES
Results/Data  Procedure: Electroencephalography (EEG)   Indications for the procedure include syncope  Were discussed with the patient and parent  Written consent was obtained prior to the procedure and is detailed in the patient's record  Prior to the start of the procedure, a time out was taken and the identity of the patient was confirmed via name and date of birth with the patient  The correct site(s) and the procedure to be performed were confirmed and the site(s) were marked appropriately  The positioning of the patient and the availabilty of the correct equipment were verified  Certain medications (such as anticonvulsants and tranquilizers), stimulants, and alcohol were avoided for at least 24-48 hours prior to the procedure  Procedure Note:   Performed by: Luis Choudhary  Start Time: 11:30   End Time: 12:00   Electrode(s) Placement: Fp1, Fp2, F7, F3, Fz, F4, F8, T3, C3, CZ, C4, T4, T5, T6, P3, PZ, P4, O1, O2, A1 and A2  They were placed in a bipolar montage, referential montage, average reference montage, laplacian montage  The EEG was performed while the patient was exposed to of photic stimulation and awake and drowsy, but not hyperventilated and not sedated  Findings: EEG    This is a routine 18 channel EEG recording performed on a 52-year-old woman with a history of syncope    Background activities during wakefulness consist of low to mid amplitude 10 cycle per second activities emanating from the posterior head region  These activities are reactive to eye opening  Intermingled with background activities are a moderate amount of lower amplitude beta activities emanating from the frontocentral head regions  Episodes of drowsiness are manifest by attenuation of background activities  Deeper stages of sleep are not seen    Photic stimulation was performed over a wide range of flash frequencies and produced a symmetrical driving response   Hyperventilation was not obtained    Concomitant EKG revealed a sinus rhythm  IMPRESSION:This is a normal routine EEG  If a seizure disorder is considered clinically a repeat tracing with sleep deprivation may be of additional diagnostic value    STELLA Yu  Impression:    Post-Procedure:   the patient tolerated the procedure well  Complications: There were no complications        Signatures   Electronically signed by : Harvey Gustafson MD; Aug 29 2016  1:28PM EST                       (Author)

## 2018-01-22 VITALS
OXYGEN SATURATION: 99 % | HEIGHT: 63 IN | WEIGHT: 165 LBS | DIASTOLIC BLOOD PRESSURE: 65 MMHG | BODY MASS INDEX: 29.23 KG/M2 | TEMPERATURE: 98.6 F | SYSTOLIC BLOOD PRESSURE: 100 MMHG | HEART RATE: 96 BPM

## 2018-02-20 ENCOUNTER — TELEPHONE (OUTPATIENT)
Dept: INTERNAL MEDICINE CLINIC | Facility: CLINIC | Age: 43
End: 2018-02-20

## 2018-02-20 ENCOUNTER — DOCUMENTATION (OUTPATIENT)
Dept: INTERNAL MEDICINE CLINIC | Facility: CLINIC | Age: 43
End: 2018-02-20

## 2018-02-20 NOTE — TELEPHONE ENCOUNTER
S/W  Pt stated she is SOB with dyspnea and chest pain since yesterday  Advised to go to ER for eval and tx  Pt verbalized understanding of same and agreed to go to ER  Will call for f/u  Dr Novoa aware

## 2018-02-21 ENCOUNTER — TELEPHONE (OUTPATIENT)
Dept: INTERNAL MEDICINE CLINIC | Facility: CLINIC | Age: 43
End: 2018-02-21

## 2018-02-21 NOTE — TELEPHONE ENCOUNTER
FYI    Patient was referred to ER yesterday for Chest Pain and SOB  Patient went to ER and waited 6 hrs and left without being seen  Patient was given an apt this Friday with Tianna  It was explained to patient that sx require ER visit and if worsen to return

## 2018-02-23 ENCOUNTER — OFFICE VISIT (OUTPATIENT)
Dept: INTERNAL MEDICINE CLINIC | Facility: CLINIC | Age: 43
End: 2018-02-23
Payer: COMMERCIAL

## 2018-02-23 VITALS
HEIGHT: 63 IN | BODY MASS INDEX: 28.7 KG/M2 | HEART RATE: 78 BPM | RESPIRATION RATE: 14 BRPM | TEMPERATURE: 98.7 F | WEIGHT: 162 LBS | DIASTOLIC BLOOD PRESSURE: 80 MMHG | OXYGEN SATURATION: 99 % | SYSTOLIC BLOOD PRESSURE: 120 MMHG

## 2018-02-23 DIAGNOSIS — R07.89 ATYPICAL CHEST PAIN: ICD-10-CM

## 2018-02-23 DIAGNOSIS — E66.3 OVERWEIGHT: Primary | ICD-10-CM

## 2018-02-23 DIAGNOSIS — F32.9 REACTIVE DEPRESSION (SITUATIONAL): ICD-10-CM

## 2018-02-23 DIAGNOSIS — J45.909 UNCOMPLICATED ASTHMA, UNSPECIFIED ASTHMA SEVERITY, UNSPECIFIED WHETHER PERSISTENT: ICD-10-CM

## 2018-02-23 DIAGNOSIS — J45.901 EXACERBATION OF ASTHMA, UNSPECIFIED ASTHMA SEVERITY, UNSPECIFIED WHETHER PERSISTENT: ICD-10-CM

## 2018-02-23 PROBLEM — R10.13 ABDOMINAL PAIN, CHRONIC, EPIGASTRIC: Status: ACTIVE | Noted: 2017-11-07

## 2018-02-23 PROBLEM — R74.8 ELEVATED SERUM GGT LEVEL: Status: ACTIVE | Noted: 2017-10-17

## 2018-02-23 PROBLEM — R31.0 GROSS HEMATURIA: Status: ACTIVE | Noted: 2017-12-05

## 2018-02-23 PROBLEM — N32.81 OVERACTIVE BLADDER: Status: ACTIVE | Noted: 2017-12-05

## 2018-02-23 PROBLEM — R10.32 ABDOMINAL PAIN, LEFT LOWER QUADRANT: Status: ACTIVE | Noted: 2017-12-05

## 2018-02-23 PROBLEM — G89.29 ABDOMINAL PAIN, CHRONIC, EPIGASTRIC: Status: ACTIVE | Noted: 2017-11-07

## 2018-02-23 PROBLEM — Z48.816 AFTERCARE FOLLOWING SURGERY OF THE GENITOURINARY SYSTEM: Status: ACTIVE | Noted: 2018-02-15

## 2018-02-23 PROBLEM — N39.3 URINARY, INCONTINENCE, STRESS FEMALE: Status: ACTIVE | Noted: 2017-01-06

## 2018-02-23 PROBLEM — R77.8 ELEVATED TOTAL PROTEIN: Status: ACTIVE | Noted: 2017-10-17

## 2018-02-23 PROBLEM — R74.8 ABNORMAL LIVER ENZYMES: Status: ACTIVE | Noted: 2017-04-19

## 2018-02-23 PROBLEM — E11.29 DIABETES MELLITUS WITH PROTEINURIA (HCC): Status: ACTIVE | Noted: 2017-10-17

## 2018-02-23 PROBLEM — R80.9 DIABETES MELLITUS WITH PROTEINURIA (HCC): Status: ACTIVE | Noted: 2017-10-17

## 2018-02-23 PROBLEM — N95.1 MENOPAUSAL SYMPTOMS: Status: ACTIVE | Noted: 2017-12-05

## 2018-02-23 PROCEDURE — 93000 ELECTROCARDIOGRAM COMPLETE: CPT | Performed by: NURSE PRACTITIONER

## 2018-02-23 PROCEDURE — 3008F BODY MASS INDEX DOCD: CPT | Performed by: NURSE PRACTITIONER

## 2018-02-23 PROCEDURE — 99214 OFFICE O/P EST MOD 30 MIN: CPT | Performed by: NURSE PRACTITIONER

## 2018-02-23 RX ORDER — PREDNISONE 10 MG/1
TABLET ORAL
Qty: 30 TABLET | Refills: 0 | Status: SHIPPED | OUTPATIENT
Start: 2018-02-23 | End: 2018-12-11

## 2018-02-23 RX ORDER — PANTOPRAZOLE SODIUM 40 MG/1
40 TABLET, DELAYED RELEASE ORAL DAILY
Qty: 30 TABLET | Refills: 0
Start: 2018-02-23 | End: 2018-02-28 | Stop reason: SDUPTHER

## 2018-02-23 RX ORDER — TOPIRAMATE 25 MG/1
25 CAPSULE, COATED PELLETS ORAL 2 TIMES DAILY
Qty: 30 CAPSULE | Refills: 0
Start: 2018-02-23 | End: 2018-12-11

## 2018-02-23 RX ORDER — MONTELUKAST SODIUM 10 MG/1
10 TABLET ORAL
Qty: 30 TABLET | Refills: 0
Start: 2018-02-23 | End: 2018-04-13 | Stop reason: SDUPTHER

## 2018-02-23 NOTE — PROGRESS NOTES
Assessment/Plan:      1  Asthma exacerbation-will start tapering doses of Prednisone and continue Ventolin inhaler as needed  2  Depression-not interested in medication at this time, will look into counseling  3  Hypertension at goal  4  Chest pain-EKG done in office today and NSR   5  Overweight-seeing weight management, on Topamax and Phentermine, doing well  Diagnoses and all orders for this visit:    Overweight  -     topiramate (TOPAMAX) 25 mg sprinkle capsule; Take 1 capsule (25 mg total) by mouth 2 (two) times a day  -     pantoprazole (PROTONIX) 40 mg tablet; Take 1 tablet (40 mg total) by mouth daily    Uncomplicated asthma, unspecified asthma severity, unspecified whether persistent  -     montelukast (SINGULAIR) 10 mg tablet; Take 1 tablet (10 mg total) by mouth daily at bedtime    Exacerbation of asthma, unspecified asthma severity, unspecified whether persistent  -     predniSONE 10 mg tablet; Take 4 tabs by mouth x 3 days, then 3 tabs by mouth x 3 days, then 2 tabs by mouth x 3 days, then 1 tab by mouth x 3 days    Reactive depression (situational)    Atypical chest pain  -     POCT ECG        The patient was counseled regarding instructions for management, risk factor reductions, patient and family education,impressions, risks and benefits of treatment options, side effects of medications, importance of compliance with treatment  The treatment plan was reviewed with the patient/guardian and patient/guardian understands and agrees with the treatment plan  Subjective:      Patient ID: Farzad Zayas is a 40 y o  female  Patient with asthma exacerbation started approx one week ago, was using Ventolin inhaler multiple times throughout the day (+) SOB, (+) wheezing  Is now down to using Ventolin twice per day, but si still wheezing at nighttime  Has dry cough  Had occasional chest tightness radiating to her back last week, when she took a deep breath   Went to [facility] ER at that time and ended up leaving as she waited for six hours  The following portions of the patient's history were reviewed and updated as appropriate:   She has a past medical history of Angina pectoris ([address] ); Anxiety; Diabetes mellitus ([address] ); Disease of thyroid gland; Hyperlipidemia; Hyponatremia; Obesity; PONV (postoperative nausea and vomiting); Proteinuria; Shortness of breath; and TIA (transient ischemic attack)  ,   does not have any pertinent problems on file  ,   has a past surgical history that includes Ovarian cyst removal (Right); Hysterectomy; and pr cystourethroscopy,fulgur <0 5 cm sarika (N/A, 3/23/2017)  ,  family history includes Diabetes in her mother and sister; Heart disease in her mother; Hyperlipidemia in her father and mother; Hypertension in her father and mother  ,   reports that she has never smoked  She has never used smokeless tobacco  She reports that she does not drink alcohol or use drugs  ,  is allergic to cephalosporins; norflex [orphenadrine]; rocephin [ceftriaxone]; sulfa antibiotics; and ultracet [tramadol-acetaminophen]       Review of Systems   Constitutional: Negative  Respiratory: Positive for chest tightness, shortness of breath and wheezing  Cardiovascular: Negative  Musculoskeletal: Negative  Psychiatric/Behavioral: Negative  Objective:     Physical Exam   Constitutional: She is oriented to person, place, and time  She appears well-developed and well-nourished  Cardiovascular: Normal rate, regular rhythm, normal heart sounds and intact distal pulses  Pulmonary/Chest: Effort normal and breath sounds normal    Musculoskeletal: Normal range of motion  Neurological: She is alert and oriented to person, place, and time  She has normal reflexes  Psychiatric: She has a normal mood and affect   Her behavior is normal  Judgment and thought content normal

## 2018-02-23 NOTE — PATIENT INSTRUCTIONS
1  Asthma exacerbation-will start tapering doses of Prednisone and continue Ventolin inhaler as needed  2  Depression-not interested in medication at this time, will look into counseling  3  Hypertension at goal  4  Chest pain-EKG done in office today and NSR   5  Overweight-seeing weight management, on Topamax and Phentermine, doing well

## 2018-02-26 ENCOUNTER — TRANSCRIBE ORDERS (OUTPATIENT)
Dept: LAB | Facility: CLINIC | Age: 43
End: 2018-02-26

## 2018-02-26 ENCOUNTER — APPOINTMENT (OUTPATIENT)
Dept: LAB | Facility: CLINIC | Age: 43
End: 2018-02-26
Payer: COMMERCIAL

## 2018-02-26 DIAGNOSIS — R31.29 MICROSCOPIC HEMATURIA: ICD-10-CM

## 2018-02-26 DIAGNOSIS — E11.22 TYPE 2 DIABETES MELLITUS WITH DIABETIC CHRONIC KIDNEY DISEASE, UNSPECIFIED CKD STAGE, UNSPECIFIED LONG TERM INSULIN USE STATUS: ICD-10-CM

## 2018-02-26 DIAGNOSIS — R77.8 ELEVATED TOTAL PROTEIN: ICD-10-CM

## 2018-02-26 DIAGNOSIS — R31.29 MICROSCOPIC HEMATURIA: Primary | ICD-10-CM

## 2018-02-26 LAB
ALBUMIN SERPL BCP-MCNC: 3.8 G/DL (ref 3.5–5)
ALP SERPL-CCNC: 117 U/L (ref 46–116)
ALT SERPL W P-5'-P-CCNC: 81 U/L (ref 12–78)
ANION GAP SERPL CALCULATED.3IONS-SCNC: 7 MMOL/L (ref 4–13)
AST SERPL W P-5'-P-CCNC: 28 U/L (ref 5–45)
BACTERIA UR QL AUTO: ABNORMAL /HPF
BASOPHILS # BLD AUTO: 0.05 THOUSANDS/ΜL (ref 0–0.1)
BASOPHILS NFR BLD AUTO: 0 % (ref 0–1)
BILIRUB SERPL-MCNC: 0.43 MG/DL (ref 0.2–1)
BILIRUB UR QL STRIP: NEGATIVE
BUN SERPL-MCNC: 6 MG/DL (ref 5–25)
CALCIUM SERPL-MCNC: 9.5 MG/DL (ref 8.3–10.1)
CHLORIDE SERPL-SCNC: 107 MMOL/L (ref 100–108)
CHOLEST SERPL-MCNC: 173 MG/DL (ref 50–200)
CLARITY UR: CLEAR
CO2 SERPL-SCNC: 26 MMOL/L (ref 21–32)
COLOR UR: YELLOW
CREAT SERPL-MCNC: 0.54 MG/DL (ref 0.6–1.3)
CREAT UR-MCNC: 193 MG/DL
EOSINOPHIL # BLD AUTO: 0.16 THOUSAND/ΜL (ref 0–0.61)
EOSINOPHIL NFR BLD AUTO: 1 % (ref 0–6)
ERYTHROCYTE [DISTWIDTH] IN BLOOD BY AUTOMATED COUNT: 13.4 % (ref 11.6–15.1)
EST. AVERAGE GLUCOSE BLD GHB EST-MCNC: 117 MG/DL
GFR SERPL CREATININE-BSD FRML MDRD: 118 ML/MIN/1.73SQ M
GGT SERPL-CCNC: 123 U/L (ref 5–85)
GLUCOSE P FAST SERPL-MCNC: 120 MG/DL (ref 65–99)
GLUCOSE UR STRIP-MCNC: ABNORMAL MG/DL
HBA1C MFR BLD: 5.7 % (ref 4.2–6.3)
HCT VFR BLD AUTO: 41.4 % (ref 34.8–46.1)
HDLC SERPL-MCNC: 45 MG/DL (ref 40–60)
HGB BLD-MCNC: 13.7 G/DL (ref 11.5–15.4)
HGB UR QL STRIP.AUTO: ABNORMAL
KETONES UR STRIP-MCNC: NEGATIVE MG/DL
LDLC SERPL CALC-MCNC: 112 MG/DL (ref 0–100)
LEUKOCYTE ESTERASE UR QL STRIP: ABNORMAL
LYMPHOCYTES # BLD AUTO: 2.43 THOUSANDS/ΜL (ref 0.6–4.47)
LYMPHOCYTES NFR BLD AUTO: 17 % (ref 14–44)
MCH RBC QN AUTO: 31.4 PG (ref 26.8–34.3)
MCHC RBC AUTO-ENTMCNC: 33.1 G/DL (ref 31.4–37.4)
MCV RBC AUTO: 95 FL (ref 82–98)
MICROALBUMIN UR-MCNC: 41 MG/L (ref 0–20)
MICROALBUMIN/CREAT 24H UR: 21 MG/G CREATININE (ref 0–30)
MONOCYTES # BLD AUTO: 0.98 THOUSAND/ΜL (ref 0.17–1.22)
MONOCYTES NFR BLD AUTO: 7 % (ref 4–12)
NEUTROPHILS # BLD AUTO: 10.65 THOUSANDS/ΜL (ref 1.85–7.62)
NEUTS SEG NFR BLD AUTO: 75 % (ref 43–75)
NITRITE UR QL STRIP: NEGATIVE
NON-SQ EPI CELLS URNS QL MICRO: ABNORMAL /HPF
NRBC BLD AUTO-RTO: 0 /100 WBCS
PH UR STRIP.AUTO: 6 [PH] (ref 4.5–8)
PLATELET # BLD AUTO: 388 THOUSANDS/UL (ref 149–390)
PMV BLD AUTO: 11 FL (ref 8.9–12.7)
POTASSIUM SERPL-SCNC: 3.8 MMOL/L (ref 3.5–5.3)
PROT SERPL-MCNC: 7.6 G/DL (ref 6.4–8.2)
PROT UR STRIP-MCNC: ABNORMAL MG/DL
RBC # BLD AUTO: 4.37 MILLION/UL (ref 3.81–5.12)
RBC #/AREA URNS AUTO: ABNORMAL /HPF
SODIUM SERPL-SCNC: 140 MMOL/L (ref 136–145)
SP GR UR STRIP.AUTO: 1.03 (ref 1–1.03)
T4 FREE SERPL-MCNC: 0.75 NG/DL (ref 0.76–1.46)
TRIGL SERPL-MCNC: 80 MG/DL
TSH SERPL DL<=0.05 MIU/L-ACNC: 3.35 UIU/ML (ref 0.36–3.74)
UROBILINOGEN UR QL STRIP.AUTO: 0.2 E.U./DL
WBC # BLD AUTO: 14.35 THOUSAND/UL (ref 4.31–10.16)
WBC #/AREA URNS AUTO: ABNORMAL /HPF

## 2018-02-26 PROCEDURE — 82570 ASSAY OF URINE CREATININE: CPT | Performed by: INTERNAL MEDICINE

## 2018-02-26 PROCEDURE — 80053 COMPREHEN METABOLIC PANEL: CPT

## 2018-02-26 PROCEDURE — 36415 COLL VENOUS BLD VENIPUNCTURE: CPT

## 2018-02-26 PROCEDURE — 81001 URINALYSIS AUTO W/SCOPE: CPT | Performed by: INTERNAL MEDICINE

## 2018-02-26 PROCEDURE — 84439 ASSAY OF FREE THYROXINE: CPT

## 2018-02-26 PROCEDURE — 3061F NEG MICROALBUMINURIA REV: CPT | Performed by: NURSE PRACTITIONER

## 2018-02-26 PROCEDURE — 85025 COMPLETE CBC W/AUTO DIFF WBC: CPT

## 2018-02-26 PROCEDURE — 82977 ASSAY OF GGT: CPT

## 2018-02-26 PROCEDURE — 84443 ASSAY THYROID STIM HORMONE: CPT

## 2018-02-26 PROCEDURE — 80061 LIPID PANEL: CPT | Performed by: INTERNAL MEDICINE

## 2018-02-26 PROCEDURE — 83036 HEMOGLOBIN GLYCOSYLATED A1C: CPT

## 2018-02-26 PROCEDURE — 82043 UR ALBUMIN QUANTITATIVE: CPT | Performed by: INTERNAL MEDICINE

## 2018-02-27 PROBLEM — R74.01 ALT (SGPT) LEVEL RAISED: Status: ACTIVE | Noted: 2018-02-27

## 2018-02-27 PROBLEM — R74.8 ELEVATED ALKALINE PHOSPHATASE LEVEL: Status: ACTIVE | Noted: 2018-02-27

## 2018-02-28 ENCOUNTER — OFFICE VISIT (OUTPATIENT)
Dept: INTERNAL MEDICINE CLINIC | Facility: CLINIC | Age: 43
End: 2018-02-28
Payer: COMMERCIAL

## 2018-02-28 VITALS
HEART RATE: 71 BPM | OXYGEN SATURATION: 99 % | HEIGHT: 63 IN | BODY MASS INDEX: 28.53 KG/M2 | SYSTOLIC BLOOD PRESSURE: 120 MMHG | DIASTOLIC BLOOD PRESSURE: 75 MMHG | WEIGHT: 161 LBS | TEMPERATURE: 96.9 F | RESPIRATION RATE: 18 BRPM

## 2018-02-28 DIAGNOSIS — R74.8 ELEVATED SERUM GGT LEVEL: ICD-10-CM

## 2018-02-28 DIAGNOSIS — J45.20 MILD INTERMITTENT ASTHMA, UNSPECIFIED WHETHER COMPLICATED: ICD-10-CM

## 2018-02-28 DIAGNOSIS — E03.9 HYPOTHYROIDISM, UNSPECIFIED TYPE: ICD-10-CM

## 2018-02-28 DIAGNOSIS — E78.5 HYPERLIPIDEMIA, UNSPECIFIED HYPERLIPIDEMIA TYPE: ICD-10-CM

## 2018-02-28 DIAGNOSIS — E11.29 DIABETES MELLITUS WITH PROTEINURIA (HCC): Primary | ICD-10-CM

## 2018-02-28 DIAGNOSIS — E66.3 OVERWEIGHT: ICD-10-CM

## 2018-02-28 DIAGNOSIS — R80.9 DIABETES MELLITUS WITH PROTEINURIA (HCC): Primary | ICD-10-CM

## 2018-02-28 DIAGNOSIS — D72.823 LEUKEMOID REACTION: ICD-10-CM

## 2018-02-28 DIAGNOSIS — R74.01 ALT (SGPT) LEVEL RAISED: ICD-10-CM

## 2018-02-28 DIAGNOSIS — R74.8 ELEVATED ALKALINE PHOSPHATASE LEVEL: ICD-10-CM

## 2018-02-28 DIAGNOSIS — G45.8 OTHER SPECIFIED TRANSIENT CEREBRAL ISCHEMIAS: ICD-10-CM

## 2018-02-28 PROBLEM — R07.89 ATYPICAL CHEST PAIN: Status: RESOLVED | Noted: 2018-02-23 | Resolved: 2018-02-28

## 2018-02-28 LAB — OTHER: 450

## 2018-02-28 PROCEDURE — 94150 VITAL CAPACITY TEST: CPT | Performed by: INTERNAL MEDICINE

## 2018-02-28 PROCEDURE — 99214 OFFICE O/P EST MOD 30 MIN: CPT | Performed by: INTERNAL MEDICINE

## 2018-02-28 RX ORDER — ATORVASTATIN CALCIUM 10 MG/1
20 TABLET, FILM COATED ORAL DAILY
Qty: 30 TABLET | Refills: 5 | Status: SHIPPED | OUTPATIENT
Start: 2018-02-28 | End: 2018-12-11 | Stop reason: SDUPTHER

## 2018-02-28 RX ORDER — METFORMIN HYDROCHLORIDE 750 MG/1
TABLET, EXTENDED RELEASE ORAL
Qty: 90 TABLET | Refills: 3 | Status: SHIPPED | OUTPATIENT
Start: 2018-02-28 | End: 2018-08-06 | Stop reason: SDUPTHER

## 2018-02-28 RX ORDER — PANTOPRAZOLE SODIUM 40 MG/1
TABLET, DELAYED RELEASE ORAL
Qty: 30 TABLET | Refills: 3 | Status: SHIPPED | OUTPATIENT
Start: 2018-02-28 | End: 2018-06-17 | Stop reason: SDUPTHER

## 2018-02-28 NOTE — PROGRESS NOTES
Assessment/Plan:    1  Asthma has had an exacerbation for the past 3 weeks patient is improving has not had a flare-up of her asthma for several years she does have cats patient was given a prescription for peak flow meter her estimated peak flow maximum is 477 which is not having any symptoms of asthma such as cough shortness of breath or wheezing she will use her rescue inhaler Ventolin 2 puffs 3 times a day for 3-4 days and find out what her personal maximum peak flow is and then occasionally check her peak flows but certainly start to check her peak flows if she develops cough shortness of breath or wheezing and to use her rescue inhaler until her peak flow was back at her maximum if she has more than 1 exacerbation of asthma per year or uses her rescue inhaler more than 8 times a month she should go on a preventative inhaler to take daily to prevent exacerbations  2  Patient with type 2 diabetes is followed by endocrinologist recently taken off of Lantus her A1c is 5 7 despite having been on prednisone for exacerbation of asthma continue to monitor with her endocrinologist Lawson Plan  3  Hyperlipidemia not at goal specially with history of TIA will increase atorvastatin to 20 mg a day has a history of hysterectomy pregnancy is not risk  4  Patient with hematuria has had this in the past has had extensive evaluation by urogynecologist including bladder biopsy continued follow-up with your gynecologist  5  Mild proteinuria continue lisinopril  6  Patient with elevated white count probably secondary to prednisone will recheck in a month  7  Patient with elevated alkaline phosphatase and GGTP minimally elevated ALT will recheck in a month as well as in 6 months  8  Patient with thyroid nodule recent ultrasound of the thyroid being followed by Endocrinology  9    Patient with hypothyroidism TSH and T4 are at goal recheck in 6 months     Diagnoses and all orders for this visit:    Diabetes mellitus with proteinuria (HCC)  -     metFORMIN (GLUCOPHAGE-XR) 750 mg 24 hr tablet; Three times a day after meals  -     HEMOGLOBIN A1C W/ EAG ESTIMATION; Future  -     Microalbumin / creatinine urine ratio; Future  -     Comprehensive metabolic panel; Future    Mild intermittent asthma, unspecified whether complicated  -     Peak Flow Meter  -     POCT peak flow  -     [facility] Allergy Panel, Adult; Future  -     Food Allergy Profile; Future    Hypothyroidism, unspecified type  -     TSH, 3rd generation; Future  -     T4, free; Future    Other specified transient cerebral ischemias    ALT (SGPT) level raised  -     Hepatic function panel; Future    Leukemoid reaction  -     CBC and differential; Future  -     CBC and differential; Future    Elevated alkaline phosphatase level  -     Hepatic function panel; Future    Elevated serum GGT level  -     Gamma GT; Future    Hyperlipidemia, unspecified hyperlipidemia type  -     Lipid Panel with Direct LDL reflex; Future  -     atorvastatin (LIPITOR) 10 mg tablet; Take 2 tablets (20 mg total) by mouth daily        The patient was counseled regarding instructions for management, risk factor reductions, patient and family education,impressions, risks and benefits of treatment options, side effects of medications, importance of compliance with treatment  The treatment plan was reviewed with the patient/guardian and patient/guardian understands and agrees with the treatment plan  Subjective:      Patient ID: Sheldon Moore is a 40 y o  female  Using rescue inhaler for 3 weeks cats        The following portions of the patient's history were reviewed and updated as appropriate:   She has a past medical history of Angina pectoris ([address] ); Anxiety; Atypical chest pain; Diabetes mellitus ([address] ); Disease of thyroid gland; Endometriosis; Hypercalcemia; Hyperlipidemia; Hyponatremia; Metrorrhagia;  Microscopic hematuria; Obesity; PONV (postoperative nausea and vomiting); Proteinuria; Shortness of breath; and TIA (transient ischemic attack)  ,   does not have any pertinent problems on file  ,   has a past surgical history that includes Ovarian cyst removal (Right); Hysterectomy; pr cystourethroscopy,fulgur <0 5 cm sarika (N/A, 3/23/2017); Laparoscopic cholecystectomy; and Oophorectomy  ,  family history includes Alcohol abuse in her father; Anxiety disorder in her daughter and mother; Arthritis in her cousin, maternal aunt, mother, and paternal aunt; Asthma in her sister; Breast cancer in her maternal aunt, paternal aunt, and sister; Colon cancer in her maternal grandmother and paternal grandmother; Depression in her child, maternal aunt, mother, and son; Diabetes in her maternal aunt, maternal uncle, mother, paternal aunt, paternal uncle, and sister; Heart attack in her mother; Heart disease in her mother; Hyperlipidemia in her father and mother; Hypertension in her cousin, father, maternal aunt, mother, and paternal aunt; Melanoma in her sister; Substance Abuse in her cousin  ,   reports that she has never smoked  She has never used smokeless tobacco  She reports that she does not drink alcohol or use drugs  ,  is allergic to cephalosporins; norflex [orphenadrine]; rocephin [ceftriaxone]; sulfa antibiotics; and ultracet [tramadol-acetaminophen]       Review of Systems   Constitutional: Negative for appetite change, chills, fatigue, fever and unexpected weight change  HENT: Negative for congestion, ear pain, facial swelling, hearing loss, mouth sores, nosebleeds, postnasal drip, rhinorrhea, sinus pain, sore throat, trouble swallowing and voice change  Eyes: Negative for pain, discharge, redness and visual disturbance  Respiratory: Positive for shortness of breath  Negative for apnea, chest tightness, wheezing and stridor  Cardiovascular: Negative for chest pain, palpitations and leg swelling     Gastrointestinal: Negative for abdominal distention, abdominal pain, blood in stool, constipation, diarrhea and vomiting  Endocrine: Negative for cold intolerance, heat intolerance, polydipsia, polyphagia and polyuria  Genitourinary: Negative for difficulty urinating, dysuria, flank pain, frequency, genital sores, hematuria and urgency  Musculoskeletal: Negative for arthralgias and back pain  Skin: Negative for rash and wound  Allergic/Immunologic: Negative for environmental allergies, food allergies and immunocompromised state  Neurological: Negative for dizziness, tremors, seizures, syncope, facial asymmetry, speech difficulty, weakness, light-headedness, numbness and headaches  Hematological: Negative for adenopathy  Does not bruise/bleed easily  Psychiatric/Behavioral: Negative for agitation, behavioral problems, dysphoric mood, hallucinations, self-injury, sleep disturbance and suicidal ideas  The patient is not hyperactive  Objective:     Physical Exam   Constitutional: She is oriented to person, place, and time  She appears well-developed  HENT:   Right Ear: External ear normal    Left Ear: External ear normal    Eyes: Right eye exhibits no discharge  Left eye exhibits no discharge  No scleral icterus  Neck: Carotid bruit is not present  No tracheal deviation present  No thyroid mass and no thyromegaly present  Cardiovascular: Normal rate, regular rhythm, normal heart sounds and intact distal pulses  Exam reveals no gallop and no friction rub  No murmur heard  Pulmonary/Chest: No respiratory distress  She has no wheezes  She has no rales  Musculoskeletal: She exhibits no edema  Lymphadenopathy:     She has no cervical adenopathy  Neurological: She is alert and oriented to person, place, and time  Coordination normal    Psychiatric: She has a normal mood and affect  Her behavior is normal  Judgment and thought content normal    Nursing note and vitals reviewed

## 2018-02-28 NOTE — PATIENT INSTRUCTIONS
Asthma   WHAT YOU NEED TO KNOW:   What is asthma? Asthma is a lung disease that makes breathing difficult  Chronic inflammation and reactions to triggers narrow the airways in the lungs  Asthma can become life-threatening if it is not managed  What is cough-variant asthma? Cough-variant asthma is a type of asthma that causes a dry cough that keeps coming back  A dry cough may be your only symptom, or you may also have chest tightness  These symptoms may be caused by exercise or exposure to odors, allergens, or respiratory tract infections  Cough-variant asthma is treated the same way as typical asthma  What are the signs and symptoms of asthma? · Coughing     · Wheezing     · Shortness of breath     · Chest tightness  What may trigger an asthma attack? · A cold, the flu, or a sinus infection     · Exercise     · Weather changes, especially cold, dry air    · Smoking or secondhand smoke    · Fumes from chemicals, dust, air pollution, or other small particles in the air    · Pets, pollen, dust mites, or cockroaches       How is asthma diagnosed? Your healthcare provider will examine you and listen to your lungs  He or she will ask how often you have symptoms and what makes them worse  Tell him or her if you have trouble sleeping, exercising, or doing other activities because of shortness of breath  Your provider will ask about your allergies and past colds, and if anyone in your family has allergies or asthma  Tell your healthcare provider about medicines you take, including over-the-counter drugs and herbal supplements  You may need a chest x-ray to check for lung problems, or a lung function test  Lung function tests show how well you can breathe  How is asthma treated? · Medicines  decrease inflammation, open airways, and make it easier to breathe  Medicines may be inhaled, taken as a pill, or injected  Short-term medicines relieve your symptoms quickly   Long-term medicines are used to prevent future attacks  You may also need medicine to help control your allergies  · Allergy testing  may find allergies that trigger an asthma attack  You may need allergy shots or medicine to control allergies that make your asthma worse  How can I manage my symptoms and prevent future attacks? · Follow your Asthma Action Plan (AAP)  This is a written plan that you and your healthcare provider create  It explains which medicine you need and when to change doses if necessary  It also explains how you can monitor symptoms and use a peak flow meter  The meter measures how well your lungs are working  · Manage other health conditions , such as allergies, acid reflux, and sleep apnea  · Identify and avoid triggers  These may include pets, dust mites, mold, and cockroaches  · Do not smoke or be around others who smoke  Nicotine and other chemicals in cigarettes and cigars can cause lung damage  Ask your healthcare provider for information if you currently smoke and need help to quit  E-cigarettes or smokeless tobacco still contain nicotine  Talk to your healthcare provider before you use these products  · Ask about the flu vaccine  The flu can make your asthma worse  You may need a yearly flu shot  When should I seek immediate care? · You have severe shortness of breath  · Your lips or nails turn blue or gray  · The skin around your neck and ribs pulls in with each breath  · You have shortness of breath, even after you take your short-term medicine as directed  · Your peak flow numbers are in the red zone of your AAP  When should I contact my healthcare provider? · You run out of medicine before your next refill is due  · Your symptoms get worse  · You need to take more medicine than usual to control your symptoms  · You have questions or concerns about your condition or care  CARE AGREEMENT:   You have the right to help plan your care   Learn about your health condition and how it may be treated  Discuss treatment options with your caregivers to decide what care you want to receive  You always have the right to refuse treatment  The above information is an  only  It is not intended as medical advice for individual conditions or treatments  Talk to your doctor, nurse or pharmacist before following any medical regimen to see if it is safe and effective for you  © 2017 [employer] Information is for End User's use only and may not be sold, redistributed or otherwise used for commercial purposes  All illustrations and images included in CareNotes® are the copyrighted property of A D A M , Inc  or Gregorio Lowery

## 2018-02-28 NOTE — LETTER
February 28, 2018     Javed David MD  [address] [address] [place]     Patient: Emilee Li   YOB: 1975   Date of Visit: 2/28/2018       Dear Dr Lakia Cosby: Thank you for referring Nayeli Roe to me for evaluation  Below are my notes for this consultation  If you have questions, please do not hesitate to call me  I look forward to following your patient along with you  Sincerely,        Stella Candelaria DO        CC: No Recipients  Stella Candelaria DO  2/28/2018 12:06 PM  Sign at close encounter  Assessment/Plan:    1  Asthma has had an exacerbation for the past 3 weeks patient is improving has not had a flare-up of her asthma for several years she does have cats patient was given a prescription for peak flow meter her estimated peak flow maximum is 477 which is not having any symptoms of asthma such as cough shortness of breath or wheezing she will use her rescue inhaler Ventolin 2 puffs 3 times a day for 3-4 days and find out what her personal maximum peak flow is and then occasionally check her peak flows but certainly start to check her peak flows if she develops cough shortness of breath or wheezing and to use her rescue inhaler until her peak flow was back at her maximum if she has more than 1 exacerbation of asthma per year or uses her rescue inhaler more than 8 times a month she should go on a preventative inhaler to take daily to prevent exacerbations  2  Patient with type 2 diabetes is followed by endocrinologist recently taken off of Lantus her A1c is 5 7 despite having been on prednisone for exacerbation of asthma continue to monitor with her endocrinologist Zander Arreola  3  Hyperlipidemia not at goal specially with history of TIA will increase atorvastatin to 20 mg a day has a history of hysterectomy pregnancy is not risk  4    Patient with hematuria has had this in the past has had extensive evaluation by urogynecologist including bladder biopsy continued follow-up with your gynecologist  5  Mild proteinuria continue lisinopril  6  Patient with elevated white count probably secondary to prednisone will recheck in a month  7  Patient with elevated alkaline phosphatase and GGTP minimally elevated ALT will recheck in a month as well as in 6 months  8  Patient with thyroid nodule recent ultrasound of the thyroid being followed by Endocrinology  9  Patient with hypothyroidism TSH and T4 are at goal recheck in 6 months     Diagnoses and all orders for this visit:    Diabetes mellitus with proteinuria ([address] )  -     metFORMIN (GLUCOPHAGE-XR) 750 mg 24 hr tablet; Three times a day after meals  -     HEMOGLOBIN A1C W/ EAG ESTIMATION; Future  -     Microalbumin / creatinine urine ratio; Future  -     Comprehensive metabolic panel; Future    Mild intermittent asthma, unspecified whether complicated  -     Peak Flow Meter  -     POCT peak flow  -     [facility] Allergy Panel, Adult; Future  -     Food Allergy Profile; Future    Hypothyroidism, unspecified type  -     TSH, 3rd generation; Future  -     T4, free; Future    Other specified transient cerebral ischemias    ALT (SGPT) level raised  -     Hepatic function panel; Future    Leukemoid reaction  -     CBC and differential; Future  -     CBC and differential; Future    Elevated alkaline phosphatase level  -     Hepatic function panel; Future    Elevated serum GGT level  -     Gamma GT; Future    Hyperlipidemia, unspecified hyperlipidemia type  -     Lipid Panel with Direct LDL reflex; Future  -     atorvastatin (LIPITOR) 10 mg tablet; Take 2 tablets (20 mg total) by mouth daily        The patient was counseled regarding instructions for management, risk factor reductions, patient and family education,impressions, risks and benefits of treatment options, side effects of medications, importance of compliance with treatment   The treatment plan was reviewed with the patient/guardian and patient/guardian understands and agrees with the treatment plan  Subjective:      Patient ID: Leonie Ritter is a 40 y o  female  Using rescue inhaler for 3 weeks cats        The following portions of the patient's history were reviewed and updated as appropriate:   She has a past medical history of Angina pectoris ([facility] [address] ); Anxiety; Atypical chest pain; Diabetes mellitus ([facility] [address] ); Disease of thyroid gland; Endometriosis; Hypercalcemia; Hyperlipidemia; Hyponatremia; Metrorrhagia; Microscopic hematuria; Obesity; PONV (postoperative nausea and vomiting); Proteinuria; Shortness of breath; and TIA (transient ischemic attack)  ,   does not have any pertinent problems on file  ,   has a past surgical history that includes Ovarian cyst removal (Right); Hysterectomy; pr cystourethroscopy,fulgur <0 5 cm lesn (N/A, 3/23/2017); Laparoscopic cholecystectomy; and Oophorectomy  ,  family history includes Alcohol abuse in her father; Anxiety disorder in her daughter and mother; Arthritis in her cousin, maternal aunt, mother, and paternal aunt; Asthma in her sister; Breast cancer in her maternal aunt, paternal aunt, and sister; Colon cancer in her maternal grandmother and paternal grandmother; Depression in her child, maternal aunt, mother, and son; Diabetes in her maternal aunt, maternal uncle, mother, paternal aunt, paternal uncle, and sister; Heart attack in her mother; Heart disease in her mother; Hyperlipidemia in her father and mother; Hypertension in her cousin, father, maternal aunt, mother, and paternal aunt; Melanoma in her sister; Substance Abuse in her cousin  ,   reports that she has never smoked  She has never used smokeless tobacco  She reports that she does not drink alcohol or use drugs  ,  is allergic to cephalosporins; norflex [orphenadrine]; rocephin [ceftriaxone]; sulfa antibiotics; and ultracet [tramadol-acetaminophen]       Review of Systems   Constitutional: Negative for appetite change, chills, fatigue, fever and unexpected weight change  HENT: Negative for congestion, ear pain, facial swelling, hearing loss, mouth sores, nosebleeds, postnasal drip, rhinorrhea, sinus pain, sore throat, trouble swallowing and voice change  Eyes: Negative for pain, discharge, redness and visual disturbance  Respiratory: Positive for shortness of breath  Negative for apnea, chest tightness, wheezing and stridor  Cardiovascular: Negative for chest pain, palpitations and leg swelling  Gastrointestinal: Negative for abdominal distention, abdominal pain, blood in stool, constipation, diarrhea and vomiting  Endocrine: Negative for cold intolerance, heat intolerance, polydipsia, polyphagia and polyuria  Genitourinary: Negative for difficulty urinating, dysuria, flank pain, frequency, genital sores, hematuria and urgency  Musculoskeletal: Negative for arthralgias and back pain  Skin: Negative for rash and wound  Allergic/Immunologic: Negative for environmental allergies, food allergies and immunocompromised state  Neurological: Negative for dizziness, tremors, seizures, syncope, facial asymmetry, speech difficulty, weakness, light-headedness, numbness and headaches  Hematological: Negative for adenopathy  Does not bruise/bleed easily  Psychiatric/Behavioral: Negative for agitation, behavioral problems, dysphoric mood, hallucinations, self-injury, sleep disturbance and suicidal ideas  The patient is not hyperactive  Objective:     Physical Exam   Constitutional: She is oriented to person, place, and time  She appears well-developed  HENT:   Right Ear: External ear normal    Left Ear: External ear normal    Eyes: Right eye exhibits no discharge  Left eye exhibits no discharge  No scleral icterus  Neck: Carotid bruit is not present  No tracheal deviation present  No thyroid mass and no thyromegaly present  Cardiovascular: Normal rate, regular rhythm, normal heart sounds and intact distal pulses    Exam reveals no gallop and no friction rub  No murmur heard  Pulmonary/Chest: No respiratory distress  She has no wheezes  She has no rales  Musculoskeletal: She exhibits no edema  Lymphadenopathy:     She has no cervical adenopathy  Neurological: She is alert and oriented to person, place, and time  Coordination normal    Psychiatric: She has a normal mood and affect  Her behavior is normal  Judgment and thought content normal    Nursing note and vitals reviewed

## 2018-03-05 ENCOUNTER — TELEPHONE (OUTPATIENT)
Dept: INTERNAL MEDICINE CLINIC | Facility: CLINIC | Age: 43
End: 2018-03-05

## 2018-03-05 NOTE — TELEPHONE ENCOUNTER
Metformin  mg needs to be prior auth per pharmacist, dosage is not in pt's forumlary    Prior auth# [id number]

## 2018-03-07 NOTE — PROCEDURES
Current Meds   1  Aspirin 81 MG Oral Tablet Delayed Release; take 2 tablet daily; Therapy: 10KFO0893 to (Last Rx:[id number])  Requested for: [id number] Ordered   2  Atorvastatin Calcium 10 MG Oral Tablet; TAKE 1 TABLET DAILY; Therapy: (Recorded:10Aug2016) to Recorded   3  Glimepiride 2 MG Oral Tablet; TAKE 1 TABLET DAILY AS DIRECTED; Therapy: (Recorded:10Aug2016) to Recorded   4  Glucagon Emergency 1 MG Injection Kit; USE AS DIRECTED; Therapy: 72MPV0302 to (Last Rx:[id number])  Requested for: 43NRV8617 Ordered   5  Lantus SoloStar 100 UNIT/ML Subcutaneous Solution Pen-injector; INJECT 30 UNIT   Daily; Therapy: (Recorded:10Aug2016) to Recorded   6  Levothyroxine Sodium 75 MCG Oral Tablet; TAKE 1 TABLET DAILY; Therapy: 32BDY8600 to ([phone])  Requested for: 43JBM9027; Last   Rx:[id number] Ordered   7  Lisinopril 5 MG Oral Tablet; TAKE 1 TABLET DAILY; Therapy: 39VGS8303 to (Elvie Fields)  Requested for: 04Zkr4087; Last   Rx:[id number] Ordered   8  MetFORMIN HCl  MG Oral Tablet Extended Release 24 Hour; Take 1 tablet twice   daily; Therapy: (Recorded:10Aug2016) to Recorded   9  ProAir  (90 Base) MCG/ACT Inhalation Aerosol Solution; INHALE 1 TO 2 PUFFS   EVERY 4 TO 6 HOURS AS NEEDED  Requested for: [id number]; Last Rx:08Jun2016   Ordered   10  Victoza 18 MG/3ML Subcutaneous Solution Pen-injector; INJECT 1 8 MG Daily; Therapy: (Recorded:10Aug2016) to Recorded   11  Vitamin D3 5000 UNIT Oral Capsule; TAKE AS DIRECTED; Therapy: 26HME8018 to (Last Rx:[id number])  Requested for: 10Aug2016 Ordered    Allergies    1  Norflex   2  Sulfonamide Derivatives   3  Ultracet TABS    Procedure    Procedure: Alexys Bullock was assessed with a Holter monitor  Indication: fainting  Symptoms during recording : Nausea, dizziness and vomiting  Duration: approximately 24 hours  Findings: This Holter monitoring and analysis was done for a period of approximately 24 hours  Rhythm is sinus  Average heart rate is 99 a minute, minimum heart rate 68 bpm  Maximum heart rate 138 bpm  There were 10 PVCs  No other ventricular events noted  There were 2 PACs and 2 episodes of supraventricular bigeminy  No other supraventricular events noted  There were a few periods of sinus tachycardia  No periods of bradycardia or pauses noted  Patient had episodes of nausea, dizziness, headache and vomiting which correlated with sinus tachycardia with rates between 113 bpm and 333 bpm    Complications:  the quality of the study allowed for adequate interpretation  Future Appointments    Date/Time Provider Specialty Site   08/17/2016 04:15 PM STELLA Torres   Nephrology  [facility]   09/13/2016 11:15 AM Horace Arnold DO Internal Medicine [address]   08/18/2016 09:30 AM Malini Carrington MD [facility]rology [facility] [address] [place] [address]   11/18/2016 09:10 AM Malini Carrington MD [facility]rology [facility] [address] [place] [address]   08/18/2016 09:00 AM [facility] [place] [place] Room Schedule  [facility] [address] [place] [address]   08/29/2016 11:00 AM [facility] [place] [place] Room Schedule  [facility] [address] [place] [address]     Signatures   Electronically signed by : STELLA Lord ; Aug 17 2016 10:33AM EST                       (Author)

## 2018-04-13 DIAGNOSIS — J45.909 UNCOMPLICATED ASTHMA, UNSPECIFIED ASTHMA SEVERITY, UNSPECIFIED WHETHER PERSISTENT: ICD-10-CM

## 2018-04-13 RX ORDER — MONTELUKAST SODIUM 10 MG/1
10 TABLET ORAL
Qty: 30 TABLET | Refills: 3
Start: 2018-04-13 | End: 2018-05-12 | Stop reason: SDUPTHER

## 2018-04-24 ENCOUNTER — TRANSCRIBE ORDERS (OUTPATIENT)
Dept: LAB | Facility: CLINIC | Age: 43
End: 2018-04-24

## 2018-04-24 ENCOUNTER — APPOINTMENT (OUTPATIENT)
Dept: LAB | Facility: CLINIC | Age: 43
End: 2018-04-24
Payer: COMMERCIAL

## 2018-04-24 DIAGNOSIS — E66.09 EXOGENOUS OBESITY: ICD-10-CM

## 2018-04-24 DIAGNOSIS — E11.21 DIABETIC GLOMERULOPATHY (HCC): ICD-10-CM

## 2018-04-24 DIAGNOSIS — E78.5 HYPERLIPIDEMIA, UNSPECIFIED HYPERLIPIDEMIA TYPE: ICD-10-CM

## 2018-04-24 DIAGNOSIS — E55.9 AVITAMINOSIS D: ICD-10-CM

## 2018-04-24 DIAGNOSIS — R74.8 ELEVATED SERUM GGT LEVEL: ICD-10-CM

## 2018-04-24 DIAGNOSIS — Z79.899 NEED FOR PROPHYLACTIC CHEMOTHERAPY: ICD-10-CM

## 2018-04-24 DIAGNOSIS — R74.8 ELEVATED ALKALINE PHOSPHATASE LEVEL: ICD-10-CM

## 2018-04-24 DIAGNOSIS — I51.9 MYXEDEMA HEART DISEASE: ICD-10-CM

## 2018-04-24 DIAGNOSIS — Z79.899 NEED FOR PROPHYLACTIC CHEMOTHERAPY: Primary | ICD-10-CM

## 2018-04-24 DIAGNOSIS — Z79.4 ENCOUNTER FOR LONG-TERM (CURRENT) USE OF INSULIN (HCC): ICD-10-CM

## 2018-04-24 DIAGNOSIS — J45.20 MILD INTERMITTENT ASTHMA, UNSPECIFIED WHETHER COMPLICATED: ICD-10-CM

## 2018-04-24 DIAGNOSIS — E03.9 MYXEDEMA HEART DISEASE: ICD-10-CM

## 2018-04-24 DIAGNOSIS — R74.01 ALT (SGPT) LEVEL RAISED: ICD-10-CM

## 2018-04-24 DIAGNOSIS — D72.823 LEUKEMOID REACTION: ICD-10-CM

## 2018-04-24 LAB
25(OH)D3 SERPL-MCNC: 10.6 NG/ML (ref 30–100)
ALBUMIN SERPL BCP-MCNC: 3.9 G/DL (ref 3.5–5)
ALP SERPL-CCNC: 96 U/L (ref 46–116)
ALT SERPL W P-5'-P-CCNC: 28 U/L (ref 12–78)
ANION GAP SERPL CALCULATED.3IONS-SCNC: 10 MMOL/L (ref 4–13)
AST SERPL W P-5'-P-CCNC: 13 U/L (ref 5–45)
BASOPHILS # BLD AUTO: 0.06 THOUSANDS/ΜL (ref 0–0.1)
BASOPHILS NFR BLD AUTO: 1 % (ref 0–1)
BILIRUB DIRECT SERPL-MCNC: 0.17 MG/DL (ref 0–0.2)
BILIRUB SERPL-MCNC: 0.65 MG/DL (ref 0.2–1)
BUN SERPL-MCNC: 12 MG/DL (ref 5–25)
CALCIUM SERPL-MCNC: 10.1 MG/DL (ref 8.3–10.1)
CHLORIDE SERPL-SCNC: 101 MMOL/L (ref 100–108)
CHOLEST SERPL-MCNC: 134 MG/DL (ref 50–200)
CO2 SERPL-SCNC: 26 MMOL/L (ref 21–32)
CREAT SERPL-MCNC: 0.71 MG/DL (ref 0.6–1.3)
CREAT UR-MCNC: 129 MG/DL
EOSINOPHIL # BLD AUTO: 0.34 THOUSAND/ΜL (ref 0–0.61)
EOSINOPHIL NFR BLD AUTO: 3 % (ref 0–6)
ERYTHROCYTE [DISTWIDTH] IN BLOOD BY AUTOMATED COUNT: 13.4 % (ref 11.6–15.1)
EST. AVERAGE GLUCOSE BLD GHB EST-MCNC: 134 MG/DL
GFR SERPL CREATININE-BSD FRML MDRD: 106 ML/MIN/1.73SQ M
GGT SERPL-CCNC: 44 U/L (ref 5–85)
GLUCOSE P FAST SERPL-MCNC: 107 MG/DL (ref 65–99)
HBA1C MFR BLD: 6.3 % (ref 4.2–6.3)
HCT VFR BLD AUTO: 46.4 % (ref 34.8–46.1)
HDLC SERPL-MCNC: 44 MG/DL (ref 40–60)
HGB BLD-MCNC: 15.3 G/DL (ref 11.5–15.4)
LDLC SERPL CALC-MCNC: 64 MG/DL (ref 0–100)
LYMPHOCYTES # BLD AUTO: 2.37 THOUSANDS/ΜL (ref 0.6–4.47)
LYMPHOCYTES NFR BLD AUTO: 22 % (ref 14–44)
MCH RBC QN AUTO: 31.5 PG (ref 26.8–34.3)
MCHC RBC AUTO-ENTMCNC: 33 G/DL (ref 31.4–37.4)
MCV RBC AUTO: 96 FL (ref 82–98)
MICROALBUMIN UR-MCNC: 14.6 MG/L (ref 0–20)
MICROALBUMIN/CREAT 24H UR: 11 MG/G CREATININE (ref 0–30)
MONOCYTES # BLD AUTO: 0.81 THOUSAND/ΜL (ref 0.17–1.22)
MONOCYTES NFR BLD AUTO: 8 % (ref 4–12)
NEUTROPHILS # BLD AUTO: 7.08 THOUSANDS/ΜL (ref 1.85–7.62)
NEUTS SEG NFR BLD AUTO: 66 % (ref 43–75)
NONHDLC SERPL-MCNC: 90 MG/DL
NRBC BLD AUTO-RTO: 0 /100 WBCS
PLATELET # BLD AUTO: 306 THOUSANDS/UL (ref 149–390)
PMV BLD AUTO: 11.3 FL (ref 8.9–12.7)
POTASSIUM SERPL-SCNC: 3.6 MMOL/L (ref 3.5–5.3)
PROT SERPL-MCNC: 8 G/DL (ref 6.4–8.2)
RBC # BLD AUTO: 4.86 MILLION/UL (ref 3.81–5.12)
SODIUM SERPL-SCNC: 137 MMOL/L (ref 136–145)
TRIGL SERPL-MCNC: 132 MG/DL
TSH SERPL DL<=0.05 MIU/L-ACNC: 1.98 UIU/ML (ref 0.36–3.74)
WBC # BLD AUTO: 10.7 THOUSAND/UL (ref 4.31–10.16)

## 2018-04-24 PROCEDURE — 82306 VITAMIN D 25 HYDROXY: CPT

## 2018-04-24 PROCEDURE — 82043 UR ALBUMIN QUANTITATIVE: CPT | Performed by: INTERNAL MEDICINE

## 2018-04-24 PROCEDURE — 3061F NEG MICROALBUMINURIA REV: CPT | Performed by: NURSE PRACTITIONER

## 2018-04-24 PROCEDURE — 82785 ASSAY OF IGE: CPT

## 2018-04-24 PROCEDURE — 82977 ASSAY OF GGT: CPT

## 2018-04-24 PROCEDURE — 36415 COLL VENOUS BLD VENIPUNCTURE: CPT

## 2018-04-24 PROCEDURE — 83036 HEMOGLOBIN GLYCOSYLATED A1C: CPT

## 2018-04-24 PROCEDURE — 84443 ASSAY THYROID STIM HORMONE: CPT

## 2018-04-24 PROCEDURE — 82570 ASSAY OF URINE CREATININE: CPT | Performed by: INTERNAL MEDICINE

## 2018-04-24 PROCEDURE — 80061 LIPID PANEL: CPT

## 2018-04-24 PROCEDURE — 80053 COMPREHEN METABOLIC PANEL: CPT

## 2018-04-24 PROCEDURE — 86008 ALLG SPEC IGE RECOMB EA: CPT

## 2018-04-24 PROCEDURE — 86003 ALLG SPEC IGE CRUDE XTRC EA: CPT

## 2018-04-24 PROCEDURE — 82248 BILIRUBIN DIRECT: CPT

## 2018-04-24 PROCEDURE — 85025 COMPLETE CBC W/AUTO DIFF WBC: CPT

## 2018-04-26 DIAGNOSIS — N39.3 URINARY, INCONTINENCE, STRESS FEMALE: Primary | ICD-10-CM

## 2018-04-26 LAB
A ALTERNATA IGE QN: <0.1 KUA/I
A FUMIGATUS IGE QN: <0.1 KUA/I
A-LACTALB IGE QN: <0.1 KAU/I
ALLERGEN COMMENT: ABNORMAL
ALLERGEN COMMENT: ABNORMAL
ALMOND IGE QN: <0.1 KUA/I
B-LACTOGLOB IGE QN: <0.1 KAU/I
BERMUDA GRASS IGE QN: <0.1 KUA/I
BOXELDER IGE QN: <0.1 KUA/I
C HERBARUM IGE QN: <0.1 KUA/I
CASEIN IGE QN: <0.1 KAU/I
CASHEW NUT IGE QN: <0.1 KUA/I
CAT DANDER IGE QN: 43.2 KUA/I
CMN PIGWEED IGE QN: <0.1 KUA/I
CODFISH IGE QN: <0.1 KUA/I
COMMON RAGWEED IGE QN: <0.1 KUA/I
COTTONWOOD IGE QN: <0.1 KUA/I
D FARINAE IGE QN: 15.7 KUA/I
D PTERONYSS IGE QN: 16.5 KUA/I
DOG DANDER IGE QN: 2.09 KUA/I
EGG WHITE IGE QN: <0.1 KUA/I
GLUTEN IGE QN: <0.1 KUA/I
HAZELNUT IGE QN: <0.1 KUA/L
LONDON PLANE IGE QN: <0.1 KUA/I
MILK IGE QN: 0.1 KUA/I
MOUSE URINE PROT IGE QN: <0.1 KUA/I
MT JUNIPER IGE QN: <0.1 KUA/I
MUGWORT IGE QN: <0.1 KUA/I
P NOTATUM IGE QN: <0.1 KUA/I
PEANUT IGE QN: <0.1 KUA/I
ROACH IGE QN: <0.1 KUA/I
SALMON IGE QN: <0.1 KUA/I
SCALLOP IGE QN: 0.11 KUA/L
SESAME SEED IGE QN: <0.1 KUA/I
SHEEP SORREL IGE QN: <0.1 KUA/I
SHRIMP IGE QN: <0.1 KUA/L
SILVER BIRCH IGE QN: 0.14 KUA/I
SOYBEAN IGE QN: <0.1 KUA/I
TIMOTHY IGE QN: <0.1 KUA/I
TOTAL IGE SMQN RAST: 222 KU/L (ref 0–113)
TOTAL IGE SMQN RAST: 239 KU/L (ref 0–113)
TUNA IGE QN: <0.1 KUA/I
WALNUT IGE QN: 0.34 KUA/I
WALNUT IGE QN: <0.1 KUA/I
WHEAT IGE QN: <0.1 KUA/I
WHITE ASH IGE QN: <0.1 KUA/I
WHITE ELM IGE QN: <0.1 KUA/I
WHITE MULBERRY IGE QN: <0.1 KUA/I
WHITE OAK IGE QN: <0.1 KUA/I

## 2018-04-26 RX ORDER — ERGOCALCIFEROL 1.25 MG/1
50000 CAPSULE ORAL WEEKLY
Qty: 12 CAPSULE | Refills: 2 | Status: SHIPPED | OUTPATIENT
Start: 2018-04-26 | End: 2020-01-13 | Stop reason: SDUPTHER

## 2018-05-03 ENCOUNTER — OFFICE VISIT (OUTPATIENT)
Dept: NEPHROLOGY | Facility: CLINIC | Age: 43
End: 2018-05-03
Payer: COMMERCIAL

## 2018-05-03 VITALS
HEART RATE: 68 BPM | SYSTOLIC BLOOD PRESSURE: 120 MMHG | BODY MASS INDEX: 28.35 KG/M2 | RESPIRATION RATE: 16 BRPM | TEMPERATURE: 99.5 F | WEIGHT: 160 LBS | DIASTOLIC BLOOD PRESSURE: 70 MMHG | HEIGHT: 63 IN

## 2018-05-03 DIAGNOSIS — R80.1 PERSISTENT PROTEINURIA: ICD-10-CM

## 2018-05-03 DIAGNOSIS — E11.21 TYPE 2 DIABETES MELLITUS WITH DIABETIC NEPHROPATHY, WITHOUT LONG-TERM CURRENT USE OF INSULIN (HCC): Primary | ICD-10-CM

## 2018-05-03 PROCEDURE — 99213 OFFICE O/P EST LOW 20 MIN: CPT | Performed by: INTERNAL MEDICINE

## 2018-05-03 RX ORDER — PHENTERMINE HYDROCHLORIDE 37.5 MG/1
TABLET ORAL
COMMUNITY
End: 2018-12-11

## 2018-05-03 NOTE — PROGRESS NOTES
NEPHROLOGY OFFICE FOLLOW UP  Fidencio Bloch 40 y o  female MRN: [id number]    Encounter: [id number] 5/3/2018    REASON FOR VISIT: Fidencio Bloch is a 40 y o  female who is here on 5/3/2018 for Follow-up; Diabetes Type 2; and Proteinuria    HPI:    Anson Aguirre came in today for follow-up of proteinuria  Since I saw her last she lost almost 20 lb  She is doing diet and exercise  Denies any complaint today        REVIEW OF SYSTEMS:    Review of Systems   Constitutional: Negative for activity change and fatigue  HENT: Negative for congestion and ear discharge  Eyes: Negative for photophobia and pain  Respiratory: Negative for apnea and choking  Cardiovascular: Negative for chest pain and palpitations  Gastrointestinal: Negative for abdominal distention and blood in stool  Endocrine: Negative for heat intolerance and polyphagia  Genitourinary: Negative for flank pain and urgency  Musculoskeletal: Negative for neck pain and neck stiffness  Skin: Negative for color change and wound  Allergic/Immunologic: Negative for food allergies and immunocompromised state  Neurological: Negative for seizures and facial asymmetry  Hematological: Negative for adenopathy  Does not bruise/bleed easily  Psychiatric/Behavioral: Negative for self-injury and suicidal ideas           PAST MEDICAL HISTORY:  Past Medical History:   Diagnosis Date    [facility]     Anxiety     Atypical chest pain     Last Assessed: 9/3/2014    Diabetes mellitus ([facility] )     Disease of thyroid gland     Endometriosis     Last Assessed:9/13/2016     Hypercalcemia     Last Assessed: 4/27/2017    Hyperlipidemia     Hyponatremia     Last assessed: 8/17/2016    Metrorrhagia     Last Assessed: 9/3/2014    Microscopic hematuria     Last Assessed: 9/14/2016    Obesity     PONV (postoperative nausea and vomiting)     Proteinuria     Shortness of breath     TIA (transient ischemic attack)        PAST SURGICAL HISTORY:  Past Surgical History:   Procedure Laterality Date    HYSTERECTOMY      Resolved: 12/2013    LAPAROSCOPIC CHOLECYSTECTOMY      Last Assessed: 10/17/2017    OOPHORECTOMY      Last Assessed: 7/6/2016    OVARIAN CYST REMOVAL Right     DE CYSTOURETHROSCOPY,FULGUR <0 5 CM CHUYITA N/A 3/23/2017    Procedure: CYSTOSCOPY; BLADDER BIOPSY; Thania Blair ;  Surgeon: Tristen Monteiro MD;  Location: [facility];  Service: Urology       SOCIAL HISTORY:  History   Alcohol Use No     Comment: social use per allscripts     History   Drug Use No     History   Smoking Status    Never Smoker   Smokeless Tobacco    Never Used       FAMILY HISTORY:  Family History   Problem Relation Age of Onset    Diabetes Mother     Hyperlipidemia Mother     Hypertension Mother     Heart disease Mother     Anxiety disorder Mother     Arthritis Mother     Depression Mother     Heart attack Mother     Hyperlipidemia Father     Hypertension Father     Alcohol abuse Father     Diabetes Sister     Asthma Sister     Melanoma Sister     Breast cancer Sister     Colon cancer Maternal Grandmother     Colon cancer Paternal Grandmother     Depression Child     Anxiety disorder Daughter     Depression Son     Arthritis Maternal Aunt     Depression Maternal Aunt     Diabetes Maternal Aunt     Hypertension Maternal Aunt     Breast cancer Maternal Aunt     Arthritis Paternal Aunt     Diabetes Paternal Aunt     Hypertension Paternal Aunt     Breast cancer Paternal Aunt     Diabetes Paternal Uncle     Arthritis Cousin     Hypertension Cousin     Substance Abuse Cousin     Diabetes Maternal Uncle        MEDICATIONS:    Current Outpatient Prescriptions:     albuterol (PROVENTIL HFA,VENTOLIN HFA) 90 mcg/act inhaler, Inhale 2 puffs every 6 (six) hours as needed for wheezing, Disp: , Rfl:     atorvastatin (LIPITOR) 10 mg tablet, Take 2 tablets (20 mg total) by mouth daily, Disp: 30 tablet, Rfl: 5    Empagliflozin (JARDIANCE PO), Take by mouth, Disp: , Rfl:     ergocalciferol (VITAMIN D2) 50,000 units, Take 1 capsule (50,000 Units total) by mouth once a week, Disp: 12 capsule, Rfl: 2    levothyroxine 75 mcg tablet, Take 75 mcg by mouth daily, Disp: , Rfl:     Liraglutide (VICTOZA) 18 MG/3ML SOPN, Inject 1 8 mg under the skin  , Disp: , Rfl:     lisinopril (ZESTRIL) 2 5 mg tablet, Take 2 5 mg by mouth daily, Disp: , Rfl:     metFORMIN (GLUCOPHAGE-XR) 750 mg 24 hr tablet, Three times a day after meals, Disp: 90 tablet, Rfl: 3    montelukast (SINGULAIR) 10 mg tablet, Take 1 tablet (10 mg total) by mouth daily at bedtime, Disp: 30 tablet, Rfl: 3    pantoprazole (PROTONIX) 40 mg tablet, TAKE 1 TABLET BY MOUTH EVERY DAY, Disp: 30 tablet, Rfl: 3    phentermine (ADIPEX-P) 37 5 MG tablet, Take by mouth, Disp: , Rfl:     predniSONE 10 mg tablet, Take 4 tabs by mouth x 3 days, then 3 tabs by mouth x 3 days, then 2 tabs by mouth x 3 days, then 1 tab by mouth x 3 days, Disp: 30 tablet, Rfl: 0    topiramate (TOPAMAX) 25 mg sprinkle capsule, Take 1 capsule (25 mg total) by mouth 2 (two) times a day, Disp: 30 capsule, Rfl: 0    PHYSICAL EXAM:  Vitals:    05/03/18 1100   BP: 120/70   BP Location: Right arm   Patient Position: Sitting   Pulse: 68   Resp: 16   Temp: 99 5 °F (37 5 °C)   TempSrc: Tympanic   Weight: 72 6 kg (160 lb)   Height: 5' 3" (1 6 m)     Body mass index is 28 34 kg/m²  Physical Exam   Constitutional: She is oriented to person, place, and time  She appears well-developed  No distress  HENT:   Head: Normocephalic  Mouth/Throat: Oropharynx is clear and moist    Eyes: Conjunctivae are normal  No scleral icterus  Neck: Neck supple  No JVD present  Cardiovascular: Normal rate and normal heart sounds  Pulmonary/Chest: Effort normal  She has no wheezes  Abdominal: Soft  There is no tenderness  Musculoskeletal: Normal range of motion  She exhibits no edema     Neurological: She is alert and oriented to person, place, and time  Skin: Skin is warm  No rash noted  Psychiatric: She has a normal mood and affect  Her behavior is normal        LAB RESULTS:  Results for orders placed or performed in visit on 04/24/18   CBC and differential   Result Value Ref Range    WBC 10 70 (H) 4 31 - 10 16 Thousand/uL    RBC 4 86 3 81 - 5 12 Million/uL    Hemoglobin 15 3 11 5 - 15 4 g/dL    Hematocrit 46 4 (H) 34 8 - 46 1 %    MCV 96 82 - 98 fL    MCH 31 5 26 8 - 34 3 pg    MCHC 33 0 31 4 - 37 4 g/dL    RDW 13 4 11 6 - 15 1 %    MPV 11 3 8 9 - 12 7 fL    Platelets 420 056 - 473 Thousands/uL    nRBC 0 /100 WBCs    Neutrophils Relative 66 43 - 75 %    Lymphocytes Relative 22 14 - 44 %    Monocytes Relative 8 4 - 12 %    Eosinophils Relative 3 0 - 6 %    Basophils Relative 1 0 - 1 %    Neutrophils Absolute 7 08 1 85 - 7 62 Thousands/µL    Lymphocytes Absolute 2 37 0 60 - 4 47 Thousands/µL    Monocytes Absolute 0 81 0 17 - 1 22 Thousand/µL    Eosinophils Absolute 0 34 0 00 - 0 61 Thousand/µL    Basophils Absolute 0 06 0 00 - 0 10 Thousands/µL   Gamma GT   Result Value Ref Range    GGT 44 5 - 85 U/L   [place] Allergy Panel, Adult   Result Value Ref Range    A  ALTERNATA <0 10 0 00 - 0 09 kUA/I    A  FUMIGATUS <0 10 0 00 - 0 09 kUA/I    [place] Grass <0 10 0 00 - 0 09 kUA/I    Spout Spring  <0 10 0 00 - 0 09 kUA/I    Cat Epithellium-Dander 43 20 (H) 0 00 - 0 09 kUA/I    C HERBARUM <0 10 0 00 - 0 09 kUA/I    Cockroach <0 10 0 00 - 0 09 kUA/I    Common Silver Birch 0 14 (H) 0 00 - 0 09 kUA/I    [place] <0 10 0 00 - 0 09 kUA/I    D  farinae 15 70 (H) 0 00 - 0 09 kUA/I    D  pteronyssinus 16 50 (H) 0 00 - 0 09 kUA/I    Dog Dander 2 09 (H) 0 00 - 0 09 kUA/I    Elm IgE <0 10 0 00 - 0 09 kUA/I    Mountain [place] Tree <0 10 0 00 - 0 09 kUA/I    Mugwort <0 10 0 00 - 0 09 kUA/I    [place] Tree <0 10 0 00 - 0 09 kUA/I    Oak <0 10 0 00 - 0 09 kUA/I    P CHRYSOGENUM <0 10 0 00 - 0 09 kUA/I    Rough Pigweed  IgE <0 10 0 00 - 0 09 kUA/I    Common Ragweed <0 10 0 00 - 0 09 kUA/I    Sheep Sorrel IgE <0 10 0 00 - 0 09 kUA/I    [place] Tree <0 10 0 00 - 0 09 kUA/I    [place] Grass <0 10 0 00 - 0 09 kUA/I    [place] Tree 0 34 (H) 0 00 - 0 09 kUA/I    White [place]un Tree <0 10 0 00 - 0 09 kUA/I    IgE 239 (H) 0 - 113 kU/l    Allergen Comment See Below     MOUSE URINE <0 10 0 00 - 0 09 kUA/I   Food Allergy Profile   Result Value Ref Range    Fish Cod <0 10 0 00 - 0 09 kUA/I    Egg White <0 10 0 00 - 0 09 kUA/I    Gluten <0 10 0 00 - 0 09 kUA/I    Milk, Cow's 0 10 (H) 0 00 - 0 09 kUA/I    Peanut <0 10 0 00 - 0 09 kUA/I    [place] <0 10 0 00 - 0 09 kUA/I    Scallop IgE 0 11 (H) 0 00 - 0 09 kUA/l    Sesame Seed IgE <0 10 0 00 - 0 09 kUA/I    Shrimp <0 10 0 00 - 0 09 kUA/l    SOYBEAN <0 10 0 00 - 0 09 kUA/I    Tuna IgE <0 10 0 00 - 0 09 kUA/I    [place] <0 10 0 00 - 0 09 kUA/I    Wheat <0 10 0 00 - 0 09 kUA/I    Almonds <0 10 0 00 - 0 09 kUA/I    Cashew <0 10 0 00 - 0 09 kUA/I    Hazelnut <0 10 0 00 - 0 09 kUA/l    IgE 222 (H) 0 - 113 kU/l    Allergen Comment See Below    HEMOGLOBIN A1C W/ EAG ESTIMATION   Result Value Ref Range    Hemoglobin A1C 6 3 4 2 - 6 3 %     mg/dl   Comprehensive metabolic panel   Result Value Ref Range    Sodium 137 136 - 145 mmol/L    Potassium 3 6 3 5 - 5 3 mmol/L    Chloride 101 100 - 108 mmol/L    CO2 26 21 - 32 mmol/L    Anion Gap 10 4 - 13 mmol/L    BUN 12 5 - 25 mg/dL    Creatinine 0 71 0 60 - 1 30 mg/dL    Glucose, Fasting 107 (H) 65 - 99 mg/dL    Calcium 10 1 8 3 - 10 1 mg/dL    AST 13 5 - 45 U/L    ALT 28 12 - 78 U/L    Alkaline Phosphatase 96 46 - 116 U/L    Total Protein 8 0 6 4 - 8 2 g/dL    Albumin 3 9 3 5 - 5 0 g/dL    Total Bilirubin 0 65 0 20 - 1 00 mg/dL    eGFR 106 ml/min/1 73sq m   Lipid panel   Result Value Ref Range    Cholesterol 134 50 - 200 mg/dL    Triglycerides 132 <=150 mg/dL    HDL, Direct 44 40 - 60 mg/dL    LDL Calculated 64 0 - 100 mg/dL    Non-HDL-Chol (CHOL-HDL) 90 mg/dl   TSH, 3rd generation   Result Value Ref Range    TSH 3RD GENERATON 1 980 0 358 - 3 740 uIU/mL   Vitamin D 25 hydroxy   Result Value Ref Range    Vit D, 25-Hydroxy 10 6 (L) 30 0 - 100 0 ng/mL   Bilirubin, direct   Result Value Ref Range    Bilirubin, Direct 0 17 0 00 - 0 20 mg/dL   Allergy, Milk Components Panel   Result Value Ref Range    Alpha Lactalbumin <0 10 0 00 - 0 09 kAU/I    Beta Lactoglobulin <0 10 0 00 - 0 09 kAU/I    [place] <0 10 0 00 - 0 09 kAU/I       ASSESSMENT and PLAN:      Diabetes (HCC)  Diabetes is getting better control with loss of weight and active changes in lifestyle    Persistent proteinuria  Proteinuria is recovered with loss of weight  Kidney function is also normal   Advised to follow up with primary doctor now        I will see her back in p r n  basis      Portions of the record may have been created with voice recognition software  Occasional wrong word or "sound a like" substitutions may have occurred due to the inherent limitations of voice recognition software  Read the chart carefully and recognize, using context, where substitutions have occurred  If you have any questions, please contact the dictating provider

## 2018-05-03 NOTE — ASSESSMENT & PLAN NOTE
Proteinuria is recovered with loss of weight    Kidney function is also normal   Advised to follow up with primary doctor now

## 2018-05-12 DIAGNOSIS — J45.909 UNCOMPLICATED ASTHMA, UNSPECIFIED ASTHMA SEVERITY, UNSPECIFIED WHETHER PERSISTENT: ICD-10-CM

## 2018-05-15 RX ORDER — MONTELUKAST SODIUM 10 MG/1
TABLET ORAL
Qty: 15 TABLET | Refills: 5 | Status: SHIPPED | OUTPATIENT
Start: 2018-05-15 | End: 2018-09-03 | Stop reason: SDUPTHER

## 2018-06-17 DIAGNOSIS — E66.3 OVERWEIGHT: ICD-10-CM

## 2018-06-18 RX ORDER — PANTOPRAZOLE SODIUM 40 MG/1
TABLET, DELAYED RELEASE ORAL
Qty: 30 TABLET | Refills: 3 | Status: SHIPPED | OUTPATIENT
Start: 2018-06-18 | End: 2018-10-11 | Stop reason: SDUPTHER

## 2018-07-17 DIAGNOSIS — I10 ESSENTIAL HYPERTENSION: Primary | ICD-10-CM

## 2018-07-17 RX ORDER — LISINOPRIL 2.5 MG/1
2.5 TABLET ORAL DAILY
Qty: 90 TABLET | Refills: 2 | Status: SHIPPED | OUTPATIENT
Start: 2018-07-17 | End: 2018-08-27 | Stop reason: SDUPTHER

## 2018-08-06 DIAGNOSIS — R80.9 DIABETES MELLITUS WITH PROTEINURIA (HCC): ICD-10-CM

## 2018-08-06 DIAGNOSIS — E11.29 DIABETES MELLITUS WITH PROTEINURIA (HCC): ICD-10-CM

## 2018-08-07 RX ORDER — METFORMIN HYDROCHLORIDE 750 MG/1
TABLET, EXTENDED RELEASE ORAL
Qty: 90 TABLET | Refills: 3 | Status: SHIPPED | OUTPATIENT
Start: 2018-08-07 | End: 2022-03-11 | Stop reason: SDUPTHER

## 2018-08-17 DIAGNOSIS — E11.29 DIABETES MELLITUS WITH PROTEINURIA (HCC): ICD-10-CM

## 2018-08-17 DIAGNOSIS — R80.9 DIABETES MELLITUS WITH PROTEINURIA (HCC): ICD-10-CM

## 2018-08-17 RX ORDER — METFORMIN HYDROCHLORIDE 750 MG/1
TABLET, EXTENDED RELEASE ORAL
Qty: 90 TABLET | Refills: 0 | Status: CANCELLED | OUTPATIENT
Start: 2018-08-17

## 2018-08-27 DIAGNOSIS — I10 ESSENTIAL HYPERTENSION: ICD-10-CM

## 2018-08-27 PROCEDURE — 4010F ACE/ARB THERAPY RXD/TAKEN: CPT | Performed by: NURSE PRACTITIONER

## 2018-08-27 RX ORDER — LISINOPRIL 2.5 MG/1
2.5 TABLET ORAL DAILY
Qty: 90 TABLET | Refills: 2 | Status: SHIPPED | OUTPATIENT
Start: 2018-08-27 | End: 2020-07-20 | Stop reason: SDUPTHER

## 2018-09-03 DIAGNOSIS — J45.909 UNCOMPLICATED ASTHMA, UNSPECIFIED ASTHMA SEVERITY, UNSPECIFIED WHETHER PERSISTENT: ICD-10-CM

## 2018-09-04 RX ORDER — MONTELUKAST SODIUM 10 MG/1
TABLET ORAL
Qty: 15 TABLET | Refills: 5 | Status: SHIPPED | OUTPATIENT
Start: 2018-09-04 | End: 2018-12-26 | Stop reason: SDUPTHER

## 2018-10-11 DIAGNOSIS — E66.3 OVERWEIGHT: ICD-10-CM

## 2018-10-11 RX ORDER — PANTOPRAZOLE SODIUM 40 MG/1
TABLET, DELAYED RELEASE ORAL
Qty: 30 TABLET | Refills: 3 | Status: SHIPPED | OUTPATIENT
Start: 2018-10-11 | End: 2019-02-19 | Stop reason: SDUPTHER

## 2018-12-06 ENCOUNTER — APPOINTMENT (OUTPATIENT)
Dept: LAB | Facility: CLINIC | Age: 43
End: 2018-12-06
Payer: COMMERCIAL

## 2018-12-06 DIAGNOSIS — E03.9 HYPOTHYROIDISM, UNSPECIFIED TYPE: ICD-10-CM

## 2018-12-06 DIAGNOSIS — E11.29 DIABETES MELLITUS WITH PROTEINURIA (HCC): ICD-10-CM

## 2018-12-06 DIAGNOSIS — R80.9 DIABETES MELLITUS WITH PROTEINURIA (HCC): ICD-10-CM

## 2018-12-06 DIAGNOSIS — D72.823 LEUKEMOID REACTION: ICD-10-CM

## 2018-12-06 DIAGNOSIS — E78.5 HYPERLIPIDEMIA, UNSPECIFIED HYPERLIPIDEMIA TYPE: ICD-10-CM

## 2018-12-06 LAB
ALBUMIN SERPL BCP-MCNC: 3.8 G/DL (ref 3.5–5)
ALP SERPL-CCNC: 97 U/L (ref 46–116)
ALT SERPL W P-5'-P-CCNC: 35 U/L (ref 12–78)
ANION GAP SERPL CALCULATED.3IONS-SCNC: 7 MMOL/L (ref 4–13)
AST SERPL W P-5'-P-CCNC: 12 U/L (ref 5–45)
BASOPHILS # BLD AUTO: 0.08 THOUSANDS/ΜL (ref 0–0.1)
BASOPHILS NFR BLD AUTO: 1 % (ref 0–1)
BILIRUB SERPL-MCNC: 0.27 MG/DL (ref 0.2–1)
BUN SERPL-MCNC: 14 MG/DL (ref 5–25)
CALCIUM SERPL-MCNC: 9.8 MG/DL (ref 8.3–10.1)
CHLORIDE SERPL-SCNC: 101 MMOL/L (ref 100–108)
CHOLEST SERPL-MCNC: 156 MG/DL (ref 50–200)
CO2 SERPL-SCNC: 26 MMOL/L (ref 21–32)
CREAT SERPL-MCNC: 0.7 MG/DL (ref 0.6–1.3)
CREAT UR-MCNC: 80.6 MG/DL
EOSINOPHIL # BLD AUTO: 0.15 THOUSAND/ΜL (ref 0–0.61)
EOSINOPHIL NFR BLD AUTO: 1 % (ref 0–6)
ERYTHROCYTE [DISTWIDTH] IN BLOOD BY AUTOMATED COUNT: 13 % (ref 11.6–15.1)
EST. AVERAGE GLUCOSE BLD GHB EST-MCNC: 137 MG/DL
GFR SERPL CREATININE-BSD FRML MDRD: 107 ML/MIN/1.73SQ M
GLUCOSE P FAST SERPL-MCNC: 127 MG/DL (ref 65–99)
HBA1C MFR BLD: 6.4 % (ref 4.2–6.3)
HCT VFR BLD AUTO: 42.5 % (ref 34.8–46.1)
HDLC SERPL-MCNC: 40 MG/DL (ref 40–60)
HGB BLD-MCNC: 13.6 G/DL (ref 11.5–15.4)
IMM GRANULOCYTES # BLD AUTO: 0.04 THOUSAND/UL (ref 0–0.2)
IMM GRANULOCYTES NFR BLD AUTO: 0 % (ref 0–2)
LDLC SERPL CALC-MCNC: 82 MG/DL (ref 0–100)
LYMPHOCYTES # BLD AUTO: 1.93 THOUSANDS/ΜL (ref 0.6–4.47)
LYMPHOCYTES NFR BLD AUTO: 18 % (ref 14–44)
MCH RBC QN AUTO: 31.6 PG (ref 26.8–34.3)
MCHC RBC AUTO-ENTMCNC: 32 G/DL (ref 31.4–37.4)
MCV RBC AUTO: 99 FL (ref 82–98)
MICROALBUMIN UR-MCNC: 47.4 MG/L (ref 0–20)
MICROALBUMIN/CREAT 24H UR: 59 MG/G CREATININE (ref 0–30)
MONOCYTES # BLD AUTO: 0.65 THOUSAND/ΜL (ref 0.17–1.22)
MONOCYTES NFR BLD AUTO: 6 % (ref 4–12)
NEUTROPHILS # BLD AUTO: 8.03 THOUSANDS/ΜL (ref 1.85–7.62)
NEUTS SEG NFR BLD AUTO: 74 % (ref 43–75)
NRBC BLD AUTO-RTO: 0 /100 WBCS
PLATELET # BLD AUTO: 356 THOUSANDS/UL (ref 149–390)
PMV BLD AUTO: 11.6 FL (ref 8.9–12.7)
POTASSIUM SERPL-SCNC: 4 MMOL/L (ref 3.5–5.3)
PROT SERPL-MCNC: 7.7 G/DL (ref 6.4–8.2)
RBC # BLD AUTO: 4.3 MILLION/UL (ref 3.81–5.12)
SODIUM SERPL-SCNC: 134 MMOL/L (ref 136–145)
T4 FREE SERPL-MCNC: 0.91 NG/DL (ref 0.76–1.46)
TRIGL SERPL-MCNC: 169 MG/DL
TSH SERPL DL<=0.05 MIU/L-ACNC: 3.45 UIU/ML (ref 0.36–3.74)
WBC # BLD AUTO: 10.88 THOUSAND/UL (ref 4.31–10.16)

## 2018-12-06 PROCEDURE — 84443 ASSAY THYROID STIM HORMONE: CPT

## 2018-12-06 PROCEDURE — 80053 COMPREHEN METABOLIC PANEL: CPT

## 2018-12-06 PROCEDURE — 82043 UR ALBUMIN QUANTITATIVE: CPT

## 2018-12-06 PROCEDURE — 84439 ASSAY OF FREE THYROXINE: CPT

## 2018-12-06 PROCEDURE — 80061 LIPID PANEL: CPT

## 2018-12-06 PROCEDURE — 82570 ASSAY OF URINE CREATININE: CPT

## 2018-12-06 PROCEDURE — 83036 HEMOGLOBIN GLYCOSYLATED A1C: CPT

## 2018-12-06 PROCEDURE — 3060F POS MICROALBUMINURIA REV: CPT | Performed by: NURSE PRACTITIONER

## 2018-12-06 PROCEDURE — 36415 COLL VENOUS BLD VENIPUNCTURE: CPT

## 2018-12-06 PROCEDURE — 85025 COMPLETE CBC W/AUTO DIFF WBC: CPT

## 2018-12-11 ENCOUNTER — OFFICE VISIT (OUTPATIENT)
Dept: INTERNAL MEDICINE CLINIC | Facility: CLINIC | Age: 43
End: 2018-12-11
Payer: COMMERCIAL

## 2018-12-11 VITALS
OXYGEN SATURATION: 98 % | HEIGHT: 63 IN | WEIGHT: 176.2 LBS | SYSTOLIC BLOOD PRESSURE: 105 MMHG | BODY MASS INDEX: 31.22 KG/M2 | DIASTOLIC BLOOD PRESSURE: 65 MMHG | TEMPERATURE: 98.1 F | HEART RATE: 88 BPM | RESPIRATION RATE: 20 BRPM

## 2018-12-11 DIAGNOSIS — E11.29 TYPE 2 DIABETES MELLITUS WITH MICROALBUMINURIA, WITHOUT LONG-TERM CURRENT USE OF INSULIN (HCC): ICD-10-CM

## 2018-12-11 DIAGNOSIS — R74.01 ALT (SGPT) LEVEL RAISED: ICD-10-CM

## 2018-12-11 DIAGNOSIS — E78.5 HYPERLIPIDEMIA, UNSPECIFIED HYPERLIPIDEMIA TYPE: ICD-10-CM

## 2018-12-11 DIAGNOSIS — E66.9 OBESITY (BMI 30.0-34.9): ICD-10-CM

## 2018-12-11 DIAGNOSIS — E11.29 DIABETES MELLITUS WITH PROTEINURIA (HCC): ICD-10-CM

## 2018-12-11 DIAGNOSIS — K76.0 NONALCOHOLIC FATTY LIVER DISEASE: ICD-10-CM

## 2018-12-11 DIAGNOSIS — J45.20 MILD INTERMITTENT ASTHMA, UNSPECIFIED WHETHER COMPLICATED: ICD-10-CM

## 2018-12-11 DIAGNOSIS — E55.9 VITAMIN D DEFICIENCY: ICD-10-CM

## 2018-12-11 DIAGNOSIS — Z23 NEED FOR PNEUMOCOCCAL VACCINE: ICD-10-CM

## 2018-12-11 DIAGNOSIS — Z82.49 FAMILY HISTORY OF MI (MYOCARDIAL INFARCTION): ICD-10-CM

## 2018-12-11 DIAGNOSIS — R77.8 ELEVATED TOTAL PROTEIN: ICD-10-CM

## 2018-12-11 DIAGNOSIS — R74.8 ABNORMAL LIVER ENZYMES: ICD-10-CM

## 2018-12-11 DIAGNOSIS — Z23 NEED FOR INFLUENZA VACCINATION: Primary | ICD-10-CM

## 2018-12-11 DIAGNOSIS — R80.9 TYPE 2 DIABETES MELLITUS WITH MICROALBUMINURIA, WITHOUT LONG-TERM CURRENT USE OF INSULIN (HCC): ICD-10-CM

## 2018-12-11 DIAGNOSIS — R30.0 DYSURIA: ICD-10-CM

## 2018-12-11 DIAGNOSIS — R74.8 ELEVATED SERUM GGT LEVEL: ICD-10-CM

## 2018-12-11 DIAGNOSIS — R80.9 DIABETES MELLITUS WITH PROTEINURIA (HCC): ICD-10-CM

## 2018-12-11 DIAGNOSIS — E03.9 HYPOTHYROIDISM, UNSPECIFIED TYPE: ICD-10-CM

## 2018-12-11 DIAGNOSIS — R74.8 ELEVATED ALKALINE PHOSPHATASE LEVEL: ICD-10-CM

## 2018-12-11 PROBLEM — R33.9 INCOMPLETE EMPTYING OF BLADDER: Status: ACTIVE | Noted: 2018-01-08

## 2018-12-11 PROBLEM — R10.32 ABDOMINAL PAIN, LEFT LOWER QUADRANT: Status: RESOLVED | Noted: 2017-12-05 | Resolved: 2018-12-11

## 2018-12-11 PROCEDURE — 99214 OFFICE O/P EST MOD 30 MIN: CPT | Performed by: INTERNAL MEDICINE

## 2018-12-11 PROCEDURE — 3008F BODY MASS INDEX DOCD: CPT | Performed by: INTERNAL MEDICINE

## 2018-12-11 PROCEDURE — 90732 PPSV23 VACC 2 YRS+ SUBQ/IM: CPT

## 2018-12-11 PROCEDURE — 90471 IMMUNIZATION ADMIN: CPT

## 2018-12-11 PROCEDURE — 1036F TOBACCO NON-USER: CPT | Performed by: INTERNAL MEDICINE

## 2018-12-11 PROCEDURE — 90686 IIV4 VACC NO PRSV 0.5 ML IM: CPT

## 2018-12-11 PROCEDURE — 90472 IMMUNIZATION ADMIN EACH ADD: CPT

## 2018-12-11 RX ORDER — ATORVASTATIN CALCIUM 40 MG/1
40 TABLET, FILM COATED ORAL DAILY
Qty: 90 TABLET | Refills: 2 | Status: SHIPPED | OUTPATIENT
Start: 2018-12-11 | End: 2019-06-26

## 2018-12-11 NOTE — LETTER
December 11, 2018     Nathalie Abdul, [address] [facility] [address]    Patient: Princess Sterling   YOB: 1975   Date of Visit: 12/11/2018       Dear Dr Allegra Estrada: Thank you for referring Bridgett Lerma to me for evaluation  Below are my notes for this consultation  If you have questions, please do not hesitate to call me  I look forward to following your patient along with you  Sincerely,        Clifton Colindres DO        CC: No Recipients  Clifton Colindres DO  12/11/2018  6:01 PM  Sign at close encounter  Assessment/Plan:    1 patient having weight gain TSH is 3 5 will be seeing her endocrinologist in the next month he may consider increasing her levothyroxine to 88 mcg or 88 alternating with 75  2  Type 2 diabetes A1c is at goal continue follow with Endocrinology can continue present medications  3  Hyperlipidemia LDL is above 70 studies demonstrating decreased risk of 1st heart attack and stroke are at the high dose and median dose of statins will increase atorvastatin to 40 mg recheck in 6 months  4  Proteinuria blood pressure is at goal patient is on an ACE-inhibitor  5  Fatty liver is much improved liver function tests are normal recheck in 6 months  6  Patient having dysuria will be checking urinalysis if she does have urinary tract infection will Rx if she gets multiple infections that may be secondary to Jardiance     Diagnoses and all orders for this visit:    Need for influenza vaccination  -     SYRINGE/SINGLE-DOSE VIAL: influenza vaccine, [id number], quadrivalent, 0 5 mL, preservative-free (AFLURIA, FLUARIX, FLULAVAL, FLUZONE)  -     CBC and differential; Future  -     Comprehensive metabolic panel; Future  -     Hemoglobin A1C; Future  -     LDL cholesterol, direct; Future  -     Lipid Panel with Direct LDL reflex; Future  -     Vitamin D 25 hydroxy; Future  -     TSH, 3rd generation with Free T4 reflex; Future  -     Magnesium;  Future  - Microalbumin / creatinine urine ratio; Future    Elevated serum GGT level  -     CBC and differential; Future  -     Comprehensive metabolic panel; Future  -     Hemoglobin A1C; Future  -     LDL cholesterol, direct; Future  -     Lipid Panel with Direct LDL reflex; Future  -     Vitamin D 25 hydroxy; Future  -     TSH, 3rd generation with Free T4 reflex; Future  -     Magnesium; Future  -     Microalbumin / creatinine urine ratio; Future    Elevated total protein  -     CBC and differential; Future  -     Comprehensive metabolic panel; Future  -     Hemoglobin A1C; Future  -     LDL cholesterol, direct; Future  -     Lipid Panel with Direct LDL reflex; Future  -     Vitamin D 25 hydroxy; Future  -     TSH, 3rd generation with Free T4 reflex; Future  -     Magnesium; Future  -     Microalbumin / creatinine urine ratio; Future    Mild intermittent asthma, unspecified whether complicated  -     CBC and differential; Future  -     Comprehensive metabolic panel; Future  -     Hemoglobin A1C; Future  -     LDL cholesterol, direct; Future  -     Lipid Panel with Direct LDL reflex; Future  -     Vitamin D 25 hydroxy; Future  -     TSH, 3rd generation with Free T4 reflex; Future  -     Magnesium; Future  -     Microalbumin / creatinine urine ratio; Future    Abnormal liver enzymes  -     CBC and differential; Future  -     Comprehensive metabolic panel; Future  -     Hemoglobin A1C; Future  -     LDL cholesterol, direct; Future  -     Lipid Panel with Direct LDL reflex; Future  -     Vitamin D 25 hydroxy; Future  -     TSH, 3rd generation with Free T4 reflex; Future  -     Magnesium; Future  -     Microalbumin / creatinine urine ratio; Future    Vitamin D deficiency  -     CBC and differential; Future  -     Comprehensive metabolic panel; Future  -     Hemoglobin A1C; Future  -     LDL cholesterol, direct; Future  -     Lipid Panel with Direct LDL reflex; Future  -     Vitamin D 25 hydroxy;  Future  -     TSH, 3rd generation with Free T4 reflex; Future  -     Magnesium; Future  -     Microalbumin / creatinine urine ratio; Future    Type 2 diabetes mellitus with microalbuminuria, without long-term current use of insulin (HCC)  -     CBC and differential; Future  -     Comprehensive metabolic panel; Future  -     Hemoglobin A1C; Future  -     LDL cholesterol, direct; Future  -     Lipid Panel with Direct LDL reflex; Future  -     Vitamin D 25 hydroxy; Future  -     TSH, 3rd generation with Free T4 reflex; Future  -     Magnesium; Future  -     Microalbumin / creatinine urine ratio; Future    ALT (SGPT) level raised  -     CBC and differential; Future  -     Comprehensive metabolic panel; Future  -     Hemoglobin A1C; Future  -     LDL cholesterol, direct; Future  -     Lipid Panel with Direct LDL reflex; Future  -     Vitamin D 25 hydroxy; Future  -     TSH, 3rd generation with Free T4 reflex; Future  -     Magnesium; Future  -     Microalbumin / creatinine urine ratio; Future    Elevated alkaline phosphatase level  -     CBC and differential; Future  -     Comprehensive metabolic panel; Future  -     Hemoglobin A1C; Future  -     LDL cholesterol, direct; Future  -     Lipid Panel with Direct LDL reflex; Future  -     Vitamin D 25 hydroxy; Future  -     TSH, 3rd generation with Free T4 reflex; Future  -     Magnesium; Future  -     Microalbumin / creatinine urine ratio; Future    Nonalcoholic fatty liver disease  -     CBC and differential; Future  -     Comprehensive metabolic panel; Future  -     Hemoglobin A1C; Future  -     LDL cholesterol, direct; Future  -     Lipid Panel with Direct LDL reflex; Future  -     Vitamin D 25 hydroxy; Future  -     TSH, 3rd generation with Free T4 reflex; Future  -     Magnesium; Future  -     Microalbumin / creatinine urine ratio; Future    Hypothyroidism, unspecified type  -     CBC and differential; Future  -     Comprehensive metabolic panel;  Future  -     Hemoglobin A1C; Future  -     LDL cholesterol, direct; Future  -     Lipid Panel with Direct LDL reflex; Future  -     Vitamin D 25 hydroxy; Future  -     TSH, 3rd generation with Free T4 reflex; Future  -     Magnesium; Future  -     Microalbumin / creatinine urine ratio; Future    Diabetes mellitus with proteinuria (HCC)  -     CBC and differential; Future  -     Comprehensive metabolic panel; Future  -     Hemoglobin A1C; Future  -     LDL cholesterol, direct; Future  -     Lipid Panel with Direct LDL reflex; Future  -     Vitamin D 25 hydroxy; Future  -     TSH, 3rd generation with Free T4 reflex; Future  -     Magnesium; Future  -     Microalbumin / creatinine urine ratio; Future    Hyperlipidemia, unspecified hyperlipidemia type  -     atorvastatin (LIPITOR) 40 mg tablet; Take 1 tablet (40 mg total) by mouth daily  -     CBC and differential; Future  -     Comprehensive metabolic panel; Future  -     Hemoglobin A1C; Future  -     LDL cholesterol, direct; Future  -     Lipid Panel with Direct LDL reflex; Future  -     Vitamin D 25 hydroxy; Future  -     TSH, 3rd generation with Free T4 reflex; Future  -     Magnesium; Future  -     Microalbumin / creatinine urine ratio; Future    Dysuria  -     CBC and differential; Future  -     Comprehensive metabolic panel; Future  -     Hemoglobin A1C; Future  -     LDL cholesterol, direct; Future  -     Lipid Panel with Direct LDL reflex; Future  -     Vitamin D 25 hydroxy; Future  -     TSH, 3rd generation with Free T4 reflex; Future  -     Magnesium; Future  -     Microalbumin / creatinine urine ratio; Future  -     UA w Reflex to Microscopic w Reflex to Culture    Obesity (BMI 30 0-34  9)    Family history of MI (myocardial infarction)         Scheduled Meds:  Continuous Infusions:  No current facility-administered medications for this visit  PRN Meds:   Scheduled Meds:  Continuous Infusions:  No current facility-administered medications for this visit     Scheduled Meds:    Current Outpatient Prescriptions:     ACCU-CHEK FASTCLIX LANCETS MISC, TEST 2 TIMES A DAY, Disp: , Rfl:     albuterol (PROVENTIL HFA,VENTOLIN HFA) 90 mcg/act inhaler, Inhale 2 puffs every 6 (six) hours as needed for wheezing, Disp: , Rfl:     atorvastatin (LIPITOR) 40 mg tablet, Take 1 tablet (40 mg total) by mouth daily, Disp: 90 tablet, Rfl: 2    Empagliflozin (JARDIANCE PO), Take by mouth, Disp: , Rfl:     ergocalciferol (VITAMIN D2) 50,000 units, Take 1 capsule (50,000 Units total) by mouth once a week, Disp: 12 capsule, Rfl: 2    glucagon (GLUCAGON EMERGENCY) 1 MG injection, Inject as directed, Disp: , Rfl:     glucose blood (ACCU-CHEK GLORIA) test strip, by In Vitro route 4 (four) times a day, Disp: , Rfl:     Insulin Pen Needle (BD PEN NEEDLE ERIK U/F) 32G X 4 MM MISC, by Does not apply route, Disp: , Rfl:     levothyroxine 75 mcg tablet, Take 75 mcg by mouth daily, Disp: , Rfl:     Liraglutide (VICTOZA) 18 MG/3ML SOPN, Inject 1 8 mg under the skin  , Disp: , Rfl:     lisinopril (ZESTRIL) 2 5 mg tablet, Take 1 tablet (2 5 mg total) by mouth daily, Disp: 90 tablet, Rfl: 2    metFORMIN (GLUCOPHAGE-XR) 750 mg 24 hr tablet, TAKE 1 TABLET BY MOUTH 3 TIMES A DAY AFTER MEALS, Disp: 90 tablet, Rfl: 3    montelukast (SINGULAIR) 10 mg tablet, TAKE 1 TABLET AT BEDTIME , Disp: 15 tablet, Rfl: 5    pantoprazole (PROTONIX) 40 mg tablet, TAKE 1 TABLET BY MOUTH EVERY DAY, Disp: 30 tablet, Rfl: 3      The patient was counseled regarding instructions for management, risk factor reductions, patient and family education,impressions, risks and benefits of treatment options, side effects of medications, importance of compliance with treatment  The treatment plan was reviewed with the patient/guardian and patient/guardian understands and agrees with the treatment plan  Subjective:      Patient ID: Chevy Platt is a 44 y o  female      HPI    The following portions of the patient's history were reviewed and updated as appropriate:   She has a past medical history of Angina pectoris ([facility] [address] ); Anxiety; Atypical chest pain; Diabetes mellitus ([facility] [address] ); Disease of thyroid gland; Endometriosis; Hypercalcemia; Hyperlipidemia; Hyponatremia; Metrorrhagia; Microscopic hematuria; Obesity; PONV (postoperative nausea and vomiting); Proteinuria; Shortness of breath; and TIA (transient ischemic attack)  ,   does not have any pertinent problems on file  ,   has a past surgical history that includes Ovarian cyst removal (Right); Hysterectomy; pr cystourethroscopy,fulgur <0 5 cm lesn (N/A, 3/23/2017); Laparoscopic cholecystectomy; and Oophorectomy  ,  family history includes Alcohol abuse in her father; Anxiety disorder in her daughter and mother; Arthritis in her cousin, maternal aunt, mother, and paternal aunt; Asthma in her sister; Breast cancer in her maternal aunt, paternal aunt, and sister; Colon cancer in her maternal grandmother and paternal grandmother; Depression in her child, maternal aunt, mother, and son; Diabetes in her maternal aunt, maternal uncle, mother, paternal aunt, paternal uncle, and sister; Heart attack in her mother; Heart disease in her mother; Hyperlipidemia in her father and mother; Hypertension in her cousin, father, maternal aunt, mother, and paternal aunt; Melanoma in her sister; Substance Abuse in her cousin  ,   reports that she has never smoked  She has never used smokeless tobacco  She reports that she does not drink alcohol or use drugs  ,  is allergic to cephalosporins; norflex [orphenadrine]; rocephin [ceftriaxone]; sulfa antibiotics; and ultracet [tramadol-acetaminophen]       Review of Systems   Constitutional: Negative for appetite change, chills, fatigue, fever and unexpected weight change  HENT: Negative for congestion, ear pain, facial swelling, hearing loss, mouth sores, nosebleeds, postnasal drip, rhinorrhea, sinus pain, sore throat, trouble swallowing and voice change      Eyes: Negative for pain, discharge, redness and visual disturbance  Respiratory: Negative for apnea, chest tightness, shortness of breath, wheezing and stridor  Cardiovascular: Negative for chest pain, palpitations and leg swelling  Gastrointestinal: Negative for abdominal distention, abdominal pain, blood in stool, constipation, diarrhea and vomiting  Endocrine: Negative for cold intolerance, heat intolerance, polydipsia, polyphagia and polyuria  Genitourinary: Positive for dysuria  Negative for difficulty urinating, flank pain, frequency, genital sores, hematuria and urgency  Musculoskeletal: Negative for arthralgias and back pain  Skin: Negative for rash and wound  Allergic/Immunologic: Negative for environmental allergies, food allergies and immunocompromised state  Neurological: Negative for dizziness, tremors, seizures, syncope, facial asymmetry, speech difficulty, weakness, light-headedness, numbness and headaches  Hematological: Negative for adenopathy  Does not bruise/bleed easily  Psychiatric/Behavioral: Negative for agitation, behavioral problems, dysphoric mood, hallucinations, self-injury, sleep disturbance and suicidal ideas  The patient is not hyperactive  Objective:     Physical Exam   Constitutional: She is oriented to person, place, and time  She appears well-developed  Over weight   HENT:   Right Ear: External ear normal    Left Ear: External ear normal    Eyes: Right eye exhibits no discharge  Left eye exhibits no discharge  No scleral icterus  Neck: Carotid bruit is not present  No tracheal deviation present  No thyroid mass and no thyromegaly present  Cardiovascular: Normal rate, regular rhythm, normal heart sounds and intact distal pulses  Exam reveals no gallop and no friction rub  No murmur heard  Pulmonary/Chest: No respiratory distress  She has no wheezes  She has no rales  Musculoskeletal: She exhibits no edema  Lymphadenopathy:     She has no cervical adenopathy  Neurological: She is alert and oriented to person, place, and time  Coordination normal    Psychiatric: She has a normal mood and affect  Her behavior is normal  Judgment and thought content normal    Nursing note and vitals reviewed  Vitals:    12/11/18 1708 12/11/18 1749   BP:  105/65   BP Location:  Left arm   Patient Position:  Sitting   Pulse: 88    Resp: 20    Temp: 98 1 °F (36 7 °C)    TempSrc: Tympanic    SpO2: 98%    Weight: 79 9 kg (176 lb 3 2 oz)    Height: 5' 3" (1 6 m)        BMI Counseling: Body mass index is 31 21 kg/m²  Discussed the patient's BMI with her  The BMI is above average  BMI counseling and education was provided to the patient  Nutrition recommendations include reducing portion sizes, decreasing overall calorie intake, 3-5 servings of fruits/vegetables daily and moderation in carbohydrate intake  Exercise recommendations include moderate aerobic physical activity for 150 minutes/week

## 2018-12-11 NOTE — PATIENT INSTRUCTIONS
Obesity   AMBULATORY CARE:   Obesity  is when your body mass index (BMI) is greater than 30  Your healthcare provider will use your height and weight to measure your BMI  The risks of obesity include  many health problems, such as injuries or physical disability  You may need tests to check for the following:  · Diabetes     · High blood pressure or high cholesterol     · Heart disease     · Gallbladder or liver disease     · Cancer of the colon, breast, prostate, liver, or kidney     · Sleep apnea     · Arthritis or gout  Seek care immediately if:   · You have a severe headache, confusion, or difficulty speaking  · You have weakness on one side of your body  · You have chest pain, sweating, or shortness of breath  Contact your healthcare provider if:   · You have symptoms of gallbladder or liver disease, such as pain in your upper abdomen  · You have knee or hip pain and discomfort while walking  · You have symptoms of diabetes, such as intense hunger and thirst, and frequent urination  · You have symptoms of sleep apnea, such as snoring or daytime sleepiness  · You have questions or concerns about your condition or care  Treatment for obesity  focuses on helping you lose weight to improve your health  Even a small decrease in BMI can reduce the risk for many health problems  Your healthcare provider will help you set a weight-loss goal   · Lifestyle changes  are the first step in treating obesity  These include making healthy food choices and getting regular physical activity  Your healthcare provider may suggest a weight-loss program that involves coaching, education, and therapy  · Medicine  may help you lose weight when it is used with a healthy diet and physical activity  · Surgery  can help you lose weight if you are very obese and have other health problems  There are several types of weight-loss surgery  Ask your healthcare provider for more information    Be successful losing weight:   · Set small, realistic goals  An example of a small goal is to walk for 20 minutes 5 days a week  Anther goal is to lose 5% of your body weight  · Tell friends, family members, and coworkers about your goals  and ask for their support  Ask a friend to lose weight with you, or join a weight-loss support group  · Identify foods or triggers that may cause you to overeat , and find ways to avoid them  Remove tempting high-calorie foods from your home and workplace  Place a bowl of fresh fruit on your kitchen counter  If stress causes you to eat, then find other ways to cope with stress  · Keep a diary to track what you eat and drink  Also write down how many minutes of physical activity you do each day  Weigh yourself once a week and record it in your diary  Eating changes: You will need to eat 500 to 1,000 fewer calories each day than you currently eat to lose 1 to 2 pounds a week  The following changes will help you cut calories:  · Eat smaller portions  Use small plates, no larger than 9 inches in diameter  Fill your plate half full of fruits and vegetables  Measure your food using measuring cups until you know what a serving size looks like  · Eat 3 meals and 1 or 2 snacks each day  Plan your meals in advance  [place] and eat at home most of the time  Eat slowly  · Eat fruits and vegetables at every meal   They are low in calories and high in fiber, which makes you feel full  Do not add butter, margarine, or cream sauce to vegetables  Use herbs to season steamed vegetables  · Eat less fat and fewer fried foods  Eat more baked or grilled chicken and fish  These protein sources are lower in calories and fat than red meat  Limit fast food  Dress your salads with olive oil and vinegar instead of bottled dressing  · Limit the amount of sugar you eat  Do not drink sugary beverages  Limit alcohol  Activity changes:  Physical activity is good for your body in many ways   It helps you burn calories and build strong muscles  It decreases stress and depression, and improves your mood  It can also help you sleep better  Talk to your healthcare provider before you begin an exercise program   · Exercise for at least 30 minutes 5 days a week  Start slowly  Set aside time each day for physical activity that you enjoy and that is convenient for you  It is best to do both weight training and an activity that increases your heart rate, such as walking, bicycling, or swimming  · Find ways to be more active  Do yard work and housecleaning  Walk up the stairs instead of using elevators  Spend your leisure time going to events that require walking, such as outdoor festivals or fairs  This extra physical activity can help you lose weight and keep it off  Follow up with your healthcare provider as directed: You may need to meet with a dietitian  Write down your questions so you remember to ask them during your visits  © 2017 [employer] [employer] Information is for End User's use only and may not be sold, redistributed or otherwise used for commercial purposes  All illustrations and images included in CareNotes® are the copyrighted property of [employer]  or Gregorio Lowery  The above information is an  only  It is not intended as medical advice for individual conditions or treatments  Talk to your doctor, nurse or pharmacist before following any medical regimen to see if it is safe and effective for you  Weight Management   AMBULATORY CARE:   Why it is important to manage your weight:  Being overweight increases your risk of health conditions such as heart disease, high blood pressure, type 2 diabetes, and certain types of cancer  It can also increase your risk for osteoarthritis, sleep apnea, and other respiratory problems  Aim for a slow, steady weight loss  Even a small amount of weight loss can lower your risk of health problems    How to lose weight safely:  A safe and healthy way to lose weight is to eat fewer calories and get regular exercise  You can lose up about 1 pound a week by decreasing the number of calories you eat by 500 calories each day  You can decrease calories by eating smaller portion sizes or by cutting out high-calorie foods  Read labels to find out how many calories are in the foods you eat  You can also burn calories with exercise such as walking, swimming, or biking  You will be more likely to keep weight off if you make these changes part of your lifestyle  Healthy meal plan for weight management:  A healthy meal plan includes a variety of foods, contains fewer calories, and helps you stay healthy  A healthy meal plan includes the following:  · Eat whole-grain foods more often  A healthy meal plan should contain fiber  Fiber is the part of grains, fruits, and vegetables that is not broken down by your body  Whole-grain foods are healthy and provide extra fiber in your diet  Some examples of whole-grain foods are whole-wheat breads and pastas, oatmeal, brown rice, and bulgur  · Eat a variety of vegetables every day  Include dark, leafy greens such as spinach, kale, annabel greens, and mustard greens  Eat yellow and orange vegetables such as carrots, sweet potatoes, and winter squash  · Eat a variety of fruits every day  Choose fresh or canned fruit (canned in its own juice or light syrup) instead of juice  Fruit juice has very little or no fiber  · Eat low-fat dairy foods  Drink fat-free (skim) milk or 1% milk  Eat fat-free yogurt and low-fat cottage cheese  Try low-fat cheeses such as mozzarella and other reduced-fat cheeses  · Choose meat and other protein foods that are low in fat  Choose beans or other legumes such as split peas or lentils  Choose fish, skinless poultry (chicken or turkey), or lean cuts of red meat (beef or pork)  Before you cook meat or poultry, cut off any visible fat  · Use less fat and oil  Try baking foods instead of frying them  Add less fat, such as margarine, sour cream, regular salad dressing and mayonnaise to foods  Eat fewer high-fat foods  Some examples of high-fat foods include french fries, doughnuts, ice cream, and cakes  · Eat fewer sweets  Limit foods and drinks that are high in sugar  This includes candy, cookies, regular soda, and sweetened drinks  Ways to decrease calories:   · Eat smaller portions  ¨ Use a small plate with smaller servings  ¨ Do not eat second helpings  ¨ When you eat at a restaurant, ask for a box and place half of your meal in the box before you eat  ¨ Share an entrée with someone else  · Replace high-calorie snacks with healthy, low-calorie snacks  ¨ Choose fresh fruit, vegetables, fat-free rice cakes, or air-popped popcorn instead of potato chips, nuts, or chocolate  ¨ Choose water or calorie-free drinks instead of soda or sweetened drinks  · Eat regular meals  Skipping meals can lead to overeating later in the day  Eat a healthy snack in place of a meal if you do not have time to eat a regular meal      · Do not shop for groceries when you are hungry  You may be more likely to make unhealthy food choices  Take a grocery list of healthy foods and shop after you have eaten  Exercise:  Exercise at least 30 minutes per day on most days of the week  Some examples of exercise include walking, biking, dancing, and swimming  You can also fit in more physical activity by taking the stairs instead of the elevator or parking farther away from stores  Ask your healthcare provider about the best exercise plan for you  Other things to consider as you try to lose weight:   · Be aware of situations that may give you the urge to overeat, such as eating while watching television  Find ways to avoid these situations  For example, read a book, go for a walk, or do crafts      · Meet with a weight loss support group or friends who are also trying to lose weight  This may help you stay motivated to continue working on your weight loss goals  © 2017 [employer] Information is for End User's use only and may not be sold, redistributed or otherwise used for commercial purposes  All illustrations and images included in CareNotes® are the copyrighted property of [employer]  or [employer] Lowery  The above information is an  only  It is not intended as medical advice for individual conditions or treatments  Talk to your doctor, nurse or pharmacist before following any medical regimen to see if it is safe and effective for you  Basic Carbohydrate Counting   AMBULATORY CARE:   Carbohydrate counting  is a way to plan your meals by counting the amount of carbohydrate in foods  Carbohydrates are the sugars, starches, and fiber found in fruit, grains, vegetables, and milk products  Carbohydrates increase your blood sugar levels  Carbohydrate counting can help you eat the right amount of carbohydrate to keep your blood sugar levels under control  What you need to know about planning meals using carbohydrate counting:  · A dietitian or healthcare provider will help you develop a healthy meal plan that works best for you  You will be taught how much carbohydrate to eat or drink for each meal and snack  Your meal plan will be based on your age, weight, usual food intake, and physical activity level  If you have diabetes, it will also include your blood sugar levels and diabetes medicine  Once you know how much carbohydrate you should eat, you can decide what type of food you want to eat  · You will need to know what foods contain carbohydrate and how much they contain  Keep track of the amount of carbohydrate in meals and snacks in order to follow your meal plan  Do not avoid carbohydrates or skip meals  Your blood sugar may fall too low if you do not eat enough carbohydrate or you skip meals    Foods that contain carbohydrate: · Breads:  Each serving of food listed below contains about 15 g of carbohydrate   ¨ 1 slice of bread (1 ounce) or 1 flour or corn tortilla (6 inch)    ¨ ½ of a hamburger bun or ¼ of a large bagel (about 1 ounce)    ¨ 1 pancake (about 4 inches across and ¼ inch thick)    · Cereals and grains:  Serving sizes of ready-to-eat cereals vary  Look at the serving size and the total carbohydrate amount listed on the food label  Each serving of food listed below contains about 15 g of carbohydrate   ¨ ¾ cup of dry, unsweetened, ready-to-eat cereal or ¼ cup of low-fat granola     ¨ ½ cup of oatmeal or other cooked cereal     ¨ ? cup of cooked rice or pasta    · Starchy vegetables and beans:  Each serving of food listed below contains about 15 g of carbohydrate   ¨ ½ cup of corn, green peas, sweet potatoes, or mashed potatoes    ¨ ¼ of a large baked potato    ¨ ½ cup of beans, lentils, and peas (garbanzo, shearer, kidney, white, split, black-eyed)    · Crackers and snacks:  Each serving of food listed below contains about 15 g of carbohydrate   ¨ 3 remi cracker squares or 8 animal crackers     ¨ 6 saltine-type crackers    ¨ 3 cups of popcorn or ¾ ounce of pretzels, potato chips, or tortilla chips    · Fruit:  Each serving of food listed below contains about 15 g of carbohydrate   ¨ 1 small (4 ounce) piece of fresh fruit or ¾ to 1 cup of fresh fruit    ¨ ½ cup of canned or frozen fruit, packed in natural juice    ¨ ½ cup (4 ounces) of unsweetened fruit juice    ¨ 2 tablespoons of dried fruit    · Desserts or sugary foods:  Each serving of food listed below contains about 15 g of carbohydrate       ¨ 2-inch square unfrosted cake or brownie     ¨ 2 small cookies    ¨ ½ cup of ice cream, frozen yogurt, or nondairy frozen yogurt    ¨ ¼ cup of sherbet or sorbet    ¨ 1 tablespoon of regular syrup, jam, or jelly    ¨ 2 tablespoons of light syrup    · Milk and yogurt:  Foods from the milk group contain about 12 g of carbohydrate per serving  ¨ 1 cup of fat-free or low-fat milk    ¨ 1 cup of soy milk    ¨ ? cup of fat-free, yogurt sweetened with artificial sweetener    · Non-starchy vegetables:  Each serving contains about 5 g of carbohydrate   Three servings of non-starch vegetables count as 1 carbohydrate serving  ¨ ½ cup of cooked vegetables or 1 cup of raw vegetables  This includes beets, broccoli, cabbage, cauliflower, cucumber, mushrooms, tomatoes, and zucchini    ¨ ½ cup of vegetable juice  How to use carbohydrate counting to plan meals:   · Count carbohydrate amounts using serving sizes:      ¨ Pasta dinner example: You plan to have pasta, tossed salad, and an 8-ounce glass of milk  Your healthcare provider tells you that you may have 4 carbohydrate servings for dinner  One carbohydrate serving of pasta is ? cup  One cup of pasta will equal 3 carbohydrate servings  An 8-ounce glass of milk will count as 1 carbohydrate serving  These amounts of food would equal 4 carbohydrate servings  One cup of tossed salad does not count toward your carbohydrate servings  · Count carbohydrate amounts using food labels:  Find the total amount of carbohydrate in a packaged food by reading the food label  Food labels tell you the serving size of the food and the total carbohydrate amount in each serving  Find the serving size on the food label and then decide how many servings you will eat  Multiply the number of servings you plan to eat by the carbohydrate amount per serving  ¨ Granola bar snack example: Your meal plan allows you to have 2 carbohydrate servings (30 grams) of carbohydrate for a snack  You plan to eat 1 package of granola bars, which contains 2 bars  According to the food label, the serving size of food in this package is 1 bar  Each serving (1 bar) contains 25 grams of carbohydrate  The total amount of carbohydrate in this package of granola bars would be 50 g   Based on your meal plan, you should eat only 1 bar  Follow up with your healthcare provider as directed:  Write down your questions so you remember to ask them during your visits  © 2017 [employer] Information is for End User's use only and may not be sold, redistributed or otherwise used for commercial purposes  All illustrations and images included in CareNotes® are the copyrighted property of [employer]  or [employer] Lowery  The above information is an  only  It is not intended as medical advice for individual conditions or treatments  Talk to your doctor, nurse or pharmacist before following any medical regimen to see if it is safe and effective for you

## 2018-12-11 NOTE — PROGRESS NOTES
Assessment/Plan:    1 patient having weight gain TSH is 3 5 will be seeing her endocrinologist in the next month he may consider increasing her levothyroxine to 88 mcg or 88 alternating with 75  2  Type 2 diabetes A1c is at goal continue follow with Endocrinology can continue present medications  3  Hyperlipidemia LDL is above 70 studies demonstrating decreased risk of 1st heart attack and stroke are at the high dose and median dose of statins will increase atorvastatin to 40 mg recheck in 6 months  4  Proteinuria blood pressure is at goal patient is on an ACE-inhibitor  5  Fatty liver is much improved liver function tests are normal recheck in 6 months  6  Patient having dysuria will be checking urinalysis if she does have urinary tract infection will Rx if she gets multiple infections that may be secondary to Jardiance     Diagnoses and all orders for this visit:    Need for influenza vaccination  -     SYRINGE/SINGLE-DOSE VIAL: influenza vaccine, [id number], quadrivalent, 0 5 mL, preservative-free (AFLURIA, FLUARIX, FLULAVAL, FLUZONE)  -     CBC and differential; Future  -     Comprehensive metabolic panel; Future  -     Hemoglobin A1C; Future  -     LDL cholesterol, direct; Future  -     Lipid Panel with Direct LDL reflex; Future  -     Vitamin D 25 hydroxy; Future  -     TSH, 3rd generation with Free T4 reflex; Future  -     Magnesium; Future  -     Microalbumin / creatinine urine ratio; Future    Elevated serum GGT level  -     CBC and differential; Future  -     Comprehensive metabolic panel; Future  -     Hemoglobin A1C; Future  -     LDL cholesterol, direct; Future  -     Lipid Panel with Direct LDL reflex; Future  -     Vitamin D 25 hydroxy; Future  -     TSH, 3rd generation with Free T4 reflex; Future  -     Magnesium; Future  -     Microalbumin / creatinine urine ratio; Future    Elevated total protein  -     CBC and differential; Future  -     Comprehensive metabolic panel;  Future  -     Hemoglobin A1C; Future  -     LDL cholesterol, direct; Future  -     Lipid Panel with Direct LDL reflex; Future  -     Vitamin D 25 hydroxy; Future  -     TSH, 3rd generation with Free T4 reflex; Future  -     Magnesium; Future  -     Microalbumin / creatinine urine ratio; Future    Mild intermittent asthma, unspecified whether complicated  -     CBC and differential; Future  -     Comprehensive metabolic panel; Future  -     Hemoglobin A1C; Future  -     LDL cholesterol, direct; Future  -     Lipid Panel with Direct LDL reflex; Future  -     Vitamin D 25 hydroxy; Future  -     TSH, 3rd generation with Free T4 reflex; Future  -     Magnesium; Future  -     Microalbumin / creatinine urine ratio; Future    Abnormal liver enzymes  -     CBC and differential; Future  -     Comprehensive metabolic panel; Future  -     Hemoglobin A1C; Future  -     LDL cholesterol, direct; Future  -     Lipid Panel with Direct LDL reflex; Future  -     Vitamin D 25 hydroxy; Future  -     TSH, 3rd generation with Free T4 reflex; Future  -     Magnesium; Future  -     Microalbumin / creatinine urine ratio; Future    Vitamin D deficiency  -     CBC and differential; Future  -     Comprehensive metabolic panel; Future  -     Hemoglobin A1C; Future  -     LDL cholesterol, direct; Future  -     Lipid Panel with Direct LDL reflex; Future  -     Vitamin D 25 hydroxy; Future  -     TSH, 3rd generation with Free T4 reflex; Future  -     Magnesium; Future  -     Microalbumin / creatinine urine ratio; Future    Type 2 diabetes mellitus with microalbuminuria, without long-term current use of insulin (HCC)  -     CBC and differential; Future  -     Comprehensive metabolic panel; Future  -     Hemoglobin A1C; Future  -     LDL cholesterol, direct; Future  -     Lipid Panel with Direct LDL reflex; Future  -     Vitamin D 25 hydroxy; Future  -     TSH, 3rd generation with Free T4 reflex; Future  -     Magnesium;  Future  -     Microalbumin / creatinine urine ratio; Future    ALT (SGPT) level raised  -     CBC and differential; Future  -     Comprehensive metabolic panel; Future  -     Hemoglobin A1C; Future  -     LDL cholesterol, direct; Future  -     Lipid Panel with Direct LDL reflex; Future  -     Vitamin D 25 hydroxy; Future  -     TSH, 3rd generation with Free T4 reflex; Future  -     Magnesium; Future  -     Microalbumin / creatinine urine ratio; Future    Elevated alkaline phosphatase level  -     CBC and differential; Future  -     Comprehensive metabolic panel; Future  -     Hemoglobin A1C; Future  -     LDL cholesterol, direct; Future  -     Lipid Panel with Direct LDL reflex; Future  -     Vitamin D 25 hydroxy; Future  -     TSH, 3rd generation with Free T4 reflex; Future  -     Magnesium; Future  -     Microalbumin / creatinine urine ratio; Future    Nonalcoholic fatty liver disease  -     CBC and differential; Future  -     Comprehensive metabolic panel; Future  -     Hemoglobin A1C; Future  -     LDL cholesterol, direct; Future  -     Lipid Panel with Direct LDL reflex; Future  -     Vitamin D 25 hydroxy; Future  -     TSH, 3rd generation with Free T4 reflex; Future  -     Magnesium; Future  -     Microalbumin / creatinine urine ratio; Future    Hypothyroidism, unspecified type  -     CBC and differential; Future  -     Comprehensive metabolic panel; Future  -     Hemoglobin A1C; Future  -     LDL cholesterol, direct; Future  -     Lipid Panel with Direct LDL reflex; Future  -     Vitamin D 25 hydroxy; Future  -     TSH, 3rd generation with Free T4 reflex; Future  -     Magnesium; Future  -     Microalbumin / creatinine urine ratio; Future    Diabetes mellitus with proteinuria (HCC)  -     CBC and differential; Future  -     Comprehensive metabolic panel; Future  -     Hemoglobin A1C; Future  -     LDL cholesterol, direct; Future  -     Lipid Panel with Direct LDL reflex; Future  -     Vitamin D 25 hydroxy;  Future  -     TSH, 3rd generation with Free T4 reflex; Future  -     Magnesium; Future  -     Microalbumin / creatinine urine ratio; Future    Hyperlipidemia, unspecified hyperlipidemia type  -     atorvastatin (LIPITOR) 40 mg tablet; Take 1 tablet (40 mg total) by mouth daily  -     CBC and differential; Future  -     Comprehensive metabolic panel; Future  -     Hemoglobin A1C; Future  -     LDL cholesterol, direct; Future  -     Lipid Panel with Direct LDL reflex; Future  -     Vitamin D 25 hydroxy; Future  -     TSH, 3rd generation with Free T4 reflex; Future  -     Magnesium; Future  -     Microalbumin / creatinine urine ratio; Future    Dysuria  -     CBC and differential; Future  -     Comprehensive metabolic panel; Future  -     Hemoglobin A1C; Future  -     LDL cholesterol, direct; Future  -     Lipid Panel with Direct LDL reflex; Future  -     Vitamin D 25 hydroxy; Future  -     TSH, 3rd generation with Free T4 reflex; Future  -     Magnesium; Future  -     Microalbumin / creatinine urine ratio; Future  -     UA w Reflex to Microscopic w Reflex to Culture    Obesity (BMI 30 0-34  9)    Family history of MI (myocardial infarction)         Scheduled Meds:  Continuous Infusions:  No current facility-administered medications for this visit  PRN Meds:   Scheduled Meds:  Continuous Infusions:  No current facility-administered medications for this visit     Scheduled Meds:    Current Outpatient Prescriptions:     ACCU-CHEK FASTCLIX LANCETS MISC, TEST 2 TIMES A DAY, Disp: , Rfl:     albuterol (PROVENTIL HFA,VENTOLIN HFA) 90 mcg/act inhaler, Inhale 2 puffs every 6 (six) hours as needed for wheezing, Disp: , Rfl:     atorvastatin (LIPITOR) 40 mg tablet, Take 1 tablet (40 mg total) by mouth daily, Disp: 90 tablet, Rfl: 2    Empagliflozin (JARDIANCE PO), Take by mouth, Disp: , Rfl:     ergocalciferol (VITAMIN D2) 50,000 units, Take 1 capsule (50,000 Units total) by mouth once a week, Disp: 12 capsule, Rfl: 2    glucagon (GLUCAGON EMERGENCY) 1 MG injection, Inject as directed, Disp: , Rfl:     glucose blood (ACCU-CHEK GLORIA) test strip, by In Vitro route 4 (four) times a day, Disp: , Rfl:     Insulin Pen Needle (BD PEN NEEDLE ERIK U/F) 32G X 4 MM MISC, by Does not apply route, Disp: , Rfl:     levothyroxine 75 mcg tablet, Take 75 mcg by mouth daily, Disp: , Rfl:     Liraglutide (VICTOZA) 18 MG/3ML SOPN, Inject 1 8 mg under the skin  , Disp: , Rfl:     lisinopril (ZESTRIL) 2 5 mg tablet, Take 1 tablet (2 5 mg total) by mouth daily, Disp: 90 tablet, Rfl: 2    metFORMIN (GLUCOPHAGE-XR) 750 mg 24 hr tablet, TAKE 1 TABLET BY MOUTH 3 TIMES A DAY AFTER MEALS, Disp: 90 tablet, Rfl: 3    montelukast (SINGULAIR) 10 mg tablet, TAKE 1 TABLET AT BEDTIME , Disp: 15 tablet, Rfl: 5    pantoprazole (PROTONIX) 40 mg tablet, TAKE 1 TABLET BY MOUTH EVERY DAY, Disp: 30 tablet, Rfl: 3      The patient was counseled regarding instructions for management, risk factor reductions, patient and family education,impressions, risks and benefits of treatment options, side effects of medications, importance of compliance with treatment  The treatment plan was reviewed with the patient/guardian and patient/guardian understands and agrees with the treatment plan  Subjective:      Patient ID: Kalyani Garrett is a 44 y o  female  HPI    The following portions of the patient's history were reviewed and updated as appropriate:   She has a past medical history of Angina pectoris ([address] ); Anxiety; Atypical chest pain; Diabetes mellitus ([address] ); Disease of thyroid gland; Endometriosis; Hypercalcemia; Hyperlipidemia; Hyponatremia; Metrorrhagia; Microscopic hematuria; Obesity; PONV (postoperative nausea and vomiting); Proteinuria; Shortness of breath; and TIA (transient ischemic attack)  ,   does not have any pertinent problems on file  ,   has a past surgical history that includes Ovarian cyst removal (Right); Hysterectomy; pr cystourethroscopy,fulgur <0 5 cm lesn (N/A, 3/23/2017);  Laparoscopic cholecystectomy; and Oophorectomy  ,  family history includes Alcohol abuse in her father; Anxiety disorder in her daughter and mother; Arthritis in her cousin, maternal aunt, mother, and paternal aunt; Asthma in her sister; Breast cancer in her maternal aunt, paternal aunt, and sister; Colon cancer in her maternal grandmother and paternal grandmother; Depression in her child, maternal aunt, mother, and son; Diabetes in her maternal aunt, maternal uncle, mother, paternal aunt, paternal uncle, and sister; Heart attack in her mother; Heart disease in her mother; Hyperlipidemia in her father and mother; Hypertension in her cousin, father, maternal aunt, mother, and paternal aunt; Melanoma in her sister; Substance Abuse in her cousin  ,   reports that she has never smoked  She has never used smokeless tobacco  She reports that she does not drink alcohol or use drugs  ,  is allergic to cephalosporins; norflex [orphenadrine]; rocephin [ceftriaxone]; sulfa antibiotics; and ultracet [tramadol-acetaminophen]       Review of Systems   Constitutional: Negative for appetite change, chills, fatigue, fever and unexpected weight change  HENT: Negative for congestion, ear pain, facial swelling, hearing loss, mouth sores, nosebleeds, postnasal drip, rhinorrhea, sinus pain, sore throat, trouble swallowing and voice change  Eyes: Negative for pain, discharge, redness and visual disturbance  Respiratory: Negative for apnea, chest tightness, shortness of breath, wheezing and stridor  Cardiovascular: Negative for chest pain, palpitations and leg swelling  Gastrointestinal: Negative for abdominal distention, abdominal pain, blood in stool, constipation, diarrhea and vomiting  Endocrine: Negative for cold intolerance, heat intolerance, polydipsia, polyphagia and polyuria  Genitourinary: Positive for dysuria  Negative for difficulty urinating, flank pain, frequency, genital sores, hematuria and urgency     Musculoskeletal: Negative for arthralgias and back pain  Skin: Negative for rash and wound  Allergic/Immunologic: Negative for environmental allergies, food allergies and immunocompromised state  Neurological: Negative for dizziness, tremors, seizures, syncope, facial asymmetry, speech difficulty, weakness, light-headedness, numbness and headaches  Hematological: Negative for adenopathy  Does not bruise/bleed easily  Psychiatric/Behavioral: Negative for agitation, behavioral problems, dysphoric mood, hallucinations, self-injury, sleep disturbance and suicidal ideas  The patient is not hyperactive  Objective:     Physical Exam   Constitutional: She is oriented to person, place, and time  She appears well-developed  Over weight   HENT:   Right Ear: External ear normal    Left Ear: External ear normal    Eyes: Right eye exhibits no discharge  Left eye exhibits no discharge  No scleral icterus  Neck: Carotid bruit is not present  No tracheal deviation present  No thyroid mass and no thyromegaly present  Cardiovascular: Normal rate, regular rhythm, normal heart sounds and intact distal pulses  Exam reveals no gallop and no friction rub  No murmur heard  Pulmonary/Chest: No respiratory distress  She has no wheezes  She has no rales  Musculoskeletal: She exhibits no edema  Lymphadenopathy:     She has no cervical adenopathy  Neurological: She is alert and oriented to person, place, and time  Coordination normal    Psychiatric: She has a normal mood and affect  Her behavior is normal  Judgment and thought content normal    Nursing note and vitals reviewed  Vitals:    12/11/18 1708 12/11/18 1749   BP:  105/65   BP Location:  Left arm   Patient Position:  Sitting   Pulse: 88    Resp: 20    Temp: 98 1 °F (36 7 °C)    TempSrc: Tympanic    SpO2: 98%    Weight: 79 9 kg (176 lb 3 2 oz)    Height: 5' 3" (1 6 m)        BMI Counseling: Body mass index is 31 21 kg/m²  Discussed the patient's BMI with her   The BMI is above average  BMI counseling and education was provided to the patient  Nutrition recommendations include reducing portion sizes, decreasing overall calorie intake, 3-5 servings of fruits/vegetables daily and moderation in carbohydrate intake  Exercise recommendations include moderate aerobic physical activity for 150 minutes/week

## 2018-12-26 DIAGNOSIS — J45.909 UNCOMPLICATED ASTHMA, UNSPECIFIED ASTHMA SEVERITY, UNSPECIFIED WHETHER PERSISTENT: ICD-10-CM

## 2018-12-28 RX ORDER — MONTELUKAST SODIUM 10 MG/1
TABLET ORAL
Qty: 15 TABLET | Refills: 5 | Status: SHIPPED | OUTPATIENT
Start: 2018-12-28 | End: 2019-04-04 | Stop reason: SDUPTHER

## 2019-02-14 RX ORDER — LIRAGLUTIDE 6 MG/ML
INJECTION SUBCUTANEOUS
Refills: 3 | OUTPATIENT
Start: 2019-02-14

## 2019-02-19 DIAGNOSIS — E66.3 OVERWEIGHT: ICD-10-CM

## 2019-02-19 RX ORDER — PANTOPRAZOLE SODIUM 40 MG/1
TABLET, DELAYED RELEASE ORAL
Qty: 30 TABLET | Refills: 3 | Status: SHIPPED | OUTPATIENT
Start: 2019-02-19 | End: 2019-06-21 | Stop reason: SDUPTHER

## 2019-04-04 DIAGNOSIS — J45.909 UNCOMPLICATED ASTHMA, UNSPECIFIED ASTHMA SEVERITY, UNSPECIFIED WHETHER PERSISTENT: ICD-10-CM

## 2019-04-04 RX ORDER — MONTELUKAST SODIUM 10 MG/1
TABLET ORAL
Qty: 15 TABLET | Refills: 5 | Status: SHIPPED | OUTPATIENT
Start: 2019-04-04 | End: 2019-08-11 | Stop reason: SDUPTHER

## 2019-04-29 ENCOUNTER — APPOINTMENT (OUTPATIENT)
Dept: LAB | Facility: CLINIC | Age: 44
End: 2019-04-29
Payer: COMMERCIAL

## 2019-04-29 ENCOUNTER — TRANSCRIBE ORDERS (OUTPATIENT)
Dept: LAB | Facility: CLINIC | Age: 44
End: 2019-04-29

## 2019-04-29 DIAGNOSIS — I51.9 MYXEDEMA HEART DISEASE: ICD-10-CM

## 2019-04-29 DIAGNOSIS — E13.8 DIABETES MELLITUS OF OTHER TYPE WITH COMPLICATION, UNSPECIFIED WHETHER LONG TERM INSULIN USE: ICD-10-CM

## 2019-04-29 DIAGNOSIS — E13.8 DIABETES MELLITUS OF OTHER TYPE WITH COMPLICATION, UNSPECIFIED WHETHER LONG TERM INSULIN USE: Primary | ICD-10-CM

## 2019-04-29 DIAGNOSIS — E55.9 AVITAMINOSIS D: ICD-10-CM

## 2019-04-29 DIAGNOSIS — E03.9 MYXEDEMA HEART DISEASE: ICD-10-CM

## 2019-04-29 DIAGNOSIS — Z83.3 FAMILY HISTORY OF DIABETES MELLITUS: ICD-10-CM

## 2019-04-29 LAB
25(OH)D3 SERPL-MCNC: 74.1 NG/ML (ref 30–100)
ALBUMIN SERPL BCP-MCNC: 3.8 G/DL (ref 3.5–5)
ALP SERPL-CCNC: 103 U/L (ref 46–116)
ALT SERPL W P-5'-P-CCNC: 36 U/L (ref 12–78)
ANION GAP SERPL CALCULATED.3IONS-SCNC: 8 MMOL/L (ref 4–13)
AST SERPL W P-5'-P-CCNC: 10 U/L (ref 5–45)
BASOPHILS # BLD AUTO: 0.07 THOUSANDS/ΜL (ref 0–0.1)
BASOPHILS NFR BLD AUTO: 1 % (ref 0–1)
BILIRUB SERPL-MCNC: 0.46 MG/DL (ref 0.2–1)
BUN SERPL-MCNC: 14 MG/DL (ref 5–25)
CALCIUM SERPL-MCNC: 8.8 MG/DL (ref 8.3–10.1)
CHLORIDE SERPL-SCNC: 105 MMOL/L (ref 100–108)
CHOLEST SERPL-MCNC: 129 MG/DL (ref 50–200)
CO2 SERPL-SCNC: 25 MMOL/L (ref 21–32)
CREAT SERPL-MCNC: 0.68 MG/DL (ref 0.6–1.3)
CREAT UR-MCNC: 164 MG/DL
EOSINOPHIL # BLD AUTO: 0.13 THOUSAND/ΜL (ref 0–0.61)
EOSINOPHIL NFR BLD AUTO: 1 % (ref 0–6)
ERYTHROCYTE [DISTWIDTH] IN BLOOD BY AUTOMATED COUNT: 13.6 % (ref 11.6–15.1)
EST. AVERAGE GLUCOSE BLD GHB EST-MCNC: 140 MG/DL
GFR SERPL CREATININE-BSD FRML MDRD: 108 ML/MIN/1.73SQ M
GLUCOSE P FAST SERPL-MCNC: 142 MG/DL (ref 65–99)
HBA1C MFR BLD: 6.5 % (ref 4.2–6.3)
HCT VFR BLD AUTO: 42.4 % (ref 34.8–46.1)
HDLC SERPL-MCNC: 35 MG/DL (ref 40–60)
HGB BLD-MCNC: 13.5 G/DL (ref 11.5–15.4)
IMM GRANULOCYTES # BLD AUTO: 0.05 THOUSAND/UL (ref 0–0.2)
IMM GRANULOCYTES NFR BLD AUTO: 1 % (ref 0–2)
LDLC SERPL CALC-MCNC: 56 MG/DL (ref 0–100)
LYMPHOCYTES # BLD AUTO: 2.02 THOUSANDS/ΜL (ref 0.6–4.47)
LYMPHOCYTES NFR BLD AUTO: 20 % (ref 14–44)
MCH RBC QN AUTO: 31.3 PG (ref 26.8–34.3)
MCHC RBC AUTO-ENTMCNC: 31.8 G/DL (ref 31.4–37.4)
MCV RBC AUTO: 98 FL (ref 82–98)
MICROALBUMIN UR-MCNC: 154 MG/L (ref 0–20)
MICROALBUMIN/CREAT 24H UR: 94 MG/G CREATININE (ref 0–30)
MONOCYTES # BLD AUTO: 0.73 THOUSAND/ΜL (ref 0.17–1.22)
MONOCYTES NFR BLD AUTO: 7 % (ref 4–12)
NEUTROPHILS # BLD AUTO: 6.89 THOUSANDS/ΜL (ref 1.85–7.62)
NEUTS SEG NFR BLD AUTO: 70 % (ref 43–75)
NONHDLC SERPL-MCNC: 94 MG/DL
NRBC BLD AUTO-RTO: 0 /100 WBCS
PLATELET # BLD AUTO: 314 THOUSANDS/UL (ref 149–390)
PMV BLD AUTO: 11.4 FL (ref 8.9–12.7)
POTASSIUM SERPL-SCNC: 4.3 MMOL/L (ref 3.5–5.3)
PROT SERPL-MCNC: 7.4 G/DL (ref 6.4–8.2)
RBC # BLD AUTO: 4.32 MILLION/UL (ref 3.81–5.12)
SODIUM SERPL-SCNC: 138 MMOL/L (ref 136–145)
TRIGL SERPL-MCNC: 190 MG/DL
TSH SERPL DL<=0.05 MIU/L-ACNC: 3.2 UIU/ML (ref 0.36–3.74)
WBC # BLD AUTO: 9.89 THOUSAND/UL (ref 4.31–10.16)

## 2019-04-29 PROCEDURE — 85025 COMPLETE CBC W/AUTO DIFF WBC: CPT

## 2019-04-29 PROCEDURE — 36415 COLL VENOUS BLD VENIPUNCTURE: CPT

## 2019-04-29 PROCEDURE — 82043 UR ALBUMIN QUANTITATIVE: CPT | Performed by: INTERNAL MEDICINE

## 2019-04-29 PROCEDURE — 84443 ASSAY THYROID STIM HORMONE: CPT

## 2019-04-29 PROCEDURE — 82306 VITAMIN D 25 HYDROXY: CPT

## 2019-04-29 PROCEDURE — 80053 COMPREHEN METABOLIC PANEL: CPT

## 2019-04-29 PROCEDURE — 82570 ASSAY OF URINE CREATININE: CPT | Performed by: INTERNAL MEDICINE

## 2019-04-29 PROCEDURE — 80061 LIPID PANEL: CPT

## 2019-04-29 PROCEDURE — 83036 HEMOGLOBIN GLYCOSYLATED A1C: CPT

## 2019-05-03 ENCOUNTER — OFFICE VISIT (OUTPATIENT)
Dept: INTERNAL MEDICINE CLINIC | Facility: CLINIC | Age: 44
End: 2019-05-03
Payer: COMMERCIAL

## 2019-05-03 VITALS
WEIGHT: 180 LBS | HEIGHT: 63 IN | RESPIRATION RATE: 18 BRPM | OXYGEN SATURATION: 98 % | DIASTOLIC BLOOD PRESSURE: 80 MMHG | TEMPERATURE: 97.4 F | HEART RATE: 78 BPM | SYSTOLIC BLOOD PRESSURE: 112 MMHG | BODY MASS INDEX: 31.89 KG/M2

## 2019-05-03 DIAGNOSIS — H61.21 IMPACTED CERUMEN OF RIGHT EAR: ICD-10-CM

## 2019-05-03 DIAGNOSIS — H65.91 RIGHT NON-SUPPURATIVE OTITIS MEDIA: Primary | ICD-10-CM

## 2019-05-03 PROCEDURE — 99213 OFFICE O/P EST LOW 20 MIN: CPT | Performed by: NURSE PRACTITIONER

## 2019-05-03 RX ORDER — AZITHROMYCIN 250 MG/1
TABLET, FILM COATED ORAL
Qty: 6 TABLET | Refills: 0 | Status: SHIPPED | OUTPATIENT
Start: 2019-05-03 | End: 2019-05-07

## 2019-05-06 ENCOUNTER — OFFICE VISIT (OUTPATIENT)
Dept: INTERNAL MEDICINE CLINIC | Facility: CLINIC | Age: 44
End: 2019-05-06
Payer: COMMERCIAL

## 2019-05-06 VITALS
BODY MASS INDEX: 31.15 KG/M2 | WEIGHT: 175.8 LBS | RESPIRATION RATE: 16 BRPM | DIASTOLIC BLOOD PRESSURE: 78 MMHG | TEMPERATURE: 98.7 F | OXYGEN SATURATION: 99 % | SYSTOLIC BLOOD PRESSURE: 116 MMHG | HEART RATE: 88 BPM | HEIGHT: 63 IN

## 2019-05-06 DIAGNOSIS — H61.21 IMPACTED CERUMEN OF RIGHT EAR: Primary | ICD-10-CM

## 2019-05-06 DIAGNOSIS — H65.91 RIGHT NON-SUPPURATIVE OTITIS MEDIA: ICD-10-CM

## 2019-05-06 DIAGNOSIS — H60.91 OTITIS EXTERNA OF RIGHT EAR, UNSPECIFIED CHRONICITY, UNSPECIFIED TYPE: ICD-10-CM

## 2019-05-06 PROBLEM — H60.90 OTITIS EXTERNA: Status: ACTIVE | Noted: 2019-05-06

## 2019-05-06 PROCEDURE — 3008F BODY MASS INDEX DOCD: CPT | Performed by: NURSE PRACTITIONER

## 2019-05-06 PROCEDURE — 69209 REMOVE IMPACTED EAR WAX UNI: CPT | Performed by: NURSE PRACTITIONER

## 2019-05-06 PROCEDURE — 99213 OFFICE O/P EST LOW 20 MIN: CPT | Performed by: NURSE PRACTITIONER

## 2019-05-06 RX ORDER — OFLOXACIN 3 MG/ML
10 SOLUTION AURICULAR (OTIC) 2 TIMES DAILY
Qty: 5 ML | Refills: 0 | Status: SHIPPED | OUTPATIENT
Start: 2019-05-06 | End: 2019-06-26 | Stop reason: ALTCHOICE

## 2019-06-21 ENCOUNTER — APPOINTMENT (OUTPATIENT)
Dept: LAB | Facility: CLINIC | Age: 44
End: 2019-06-21
Payer: COMMERCIAL

## 2019-06-21 DIAGNOSIS — R30.0 DYSURIA: ICD-10-CM

## 2019-06-21 DIAGNOSIS — R74.8 ELEVATED SERUM GGT LEVEL: ICD-10-CM

## 2019-06-21 DIAGNOSIS — R80.9 TYPE 2 DIABETES MELLITUS WITH MICROALBUMINURIA, WITHOUT LONG-TERM CURRENT USE OF INSULIN (HCC): ICD-10-CM

## 2019-06-21 DIAGNOSIS — E55.9 VITAMIN D DEFICIENCY: ICD-10-CM

## 2019-06-21 DIAGNOSIS — Z23 NEED FOR INFLUENZA VACCINATION: ICD-10-CM

## 2019-06-21 DIAGNOSIS — R80.9 DIABETES MELLITUS WITH PROTEINURIA (HCC): ICD-10-CM

## 2019-06-21 DIAGNOSIS — R77.8 ELEVATED TOTAL PROTEIN: ICD-10-CM

## 2019-06-21 DIAGNOSIS — R74.01 ALT (SGPT) LEVEL RAISED: ICD-10-CM

## 2019-06-21 DIAGNOSIS — E11.29 DIABETES MELLITUS WITH PROTEINURIA (HCC): ICD-10-CM

## 2019-06-21 DIAGNOSIS — E78.5 HYPERLIPIDEMIA, UNSPECIFIED HYPERLIPIDEMIA TYPE: ICD-10-CM

## 2019-06-21 DIAGNOSIS — E11.29 TYPE 2 DIABETES MELLITUS WITH MICROALBUMINURIA, WITHOUT LONG-TERM CURRENT USE OF INSULIN (HCC): ICD-10-CM

## 2019-06-21 DIAGNOSIS — K76.0 NONALCOHOLIC FATTY LIVER DISEASE: ICD-10-CM

## 2019-06-21 DIAGNOSIS — E66.3 OVERWEIGHT: ICD-10-CM

## 2019-06-21 DIAGNOSIS — R74.8 ABNORMAL LIVER ENZYMES: ICD-10-CM

## 2019-06-21 DIAGNOSIS — R74.8 ELEVATED ALKALINE PHOSPHATASE LEVEL: ICD-10-CM

## 2019-06-21 DIAGNOSIS — J45.20 MILD INTERMITTENT ASTHMA, UNSPECIFIED WHETHER COMPLICATED: ICD-10-CM

## 2019-06-21 DIAGNOSIS — E03.9 HYPOTHYROIDISM, UNSPECIFIED TYPE: ICD-10-CM

## 2019-06-21 LAB
25(OH)D3 SERPL-MCNC: 55.7 NG/ML (ref 30–100)
ALBUMIN SERPL BCP-MCNC: 3.8 G/DL (ref 3.5–5)
ALP SERPL-CCNC: 118 U/L (ref 46–116)
ALT SERPL W P-5'-P-CCNC: 56 U/L (ref 12–78)
ANION GAP SERPL CALCULATED.3IONS-SCNC: 5 MMOL/L (ref 4–13)
AST SERPL W P-5'-P-CCNC: 20 U/L (ref 5–45)
BACTERIA UR QL AUTO: ABNORMAL /HPF
BASOPHILS # BLD AUTO: 0.08 THOUSANDS/ΜL (ref 0–0.1)
BASOPHILS NFR BLD AUTO: 1 % (ref 0–1)
BILIRUB SERPL-MCNC: 0.33 MG/DL (ref 0.2–1)
BILIRUB UR QL STRIP: NEGATIVE
BUN SERPL-MCNC: 10 MG/DL (ref 5–25)
CALCIUM SERPL-MCNC: 9.3 MG/DL (ref 8.3–10.1)
CHLORIDE SERPL-SCNC: 105 MMOL/L (ref 100–108)
CHOLEST SERPL-MCNC: 118 MG/DL (ref 50–200)
CLARITY UR: CLEAR
CO2 SERPL-SCNC: 27 MMOL/L (ref 21–32)
COLOR UR: YELLOW
CREAT SERPL-MCNC: 0.6 MG/DL (ref 0.6–1.3)
CREAT UR-MCNC: 182 MG/DL
EOSINOPHIL # BLD AUTO: 0.17 THOUSAND/ΜL (ref 0–0.61)
EOSINOPHIL NFR BLD AUTO: 2 % (ref 0–6)
ERYTHROCYTE [DISTWIDTH] IN BLOOD BY AUTOMATED COUNT: 13.3 % (ref 11.6–15.1)
EST. AVERAGE GLUCOSE BLD GHB EST-MCNC: 143 MG/DL
GFR SERPL CREATININE-BSD FRML MDRD: 113 ML/MIN/1.73SQ M
GLUCOSE P FAST SERPL-MCNC: 112 MG/DL (ref 65–99)
GLUCOSE UR STRIP-MCNC: NEGATIVE MG/DL
HBA1C MFR BLD: 6.6 % (ref 4.2–6.3)
HCT VFR BLD AUTO: 40.5 % (ref 34.8–46.1)
HDLC SERPL-MCNC: 30 MG/DL (ref 40–60)
HGB BLD-MCNC: 13.1 G/DL (ref 11.5–15.4)
HGB UR QL STRIP.AUTO: ABNORMAL
HYALINE CASTS #/AREA URNS LPF: ABNORMAL /LPF
IMM GRANULOCYTES # BLD AUTO: 0.04 THOUSAND/UL (ref 0–0.2)
IMM GRANULOCYTES NFR BLD AUTO: 0 % (ref 0–2)
KETONES UR STRIP-MCNC: ABNORMAL MG/DL
LDLC SERPL CALC-MCNC: 54 MG/DL (ref 0–100)
LDLC SERPL DIRECT ASSAY-MCNC: 62 MG/DL (ref 0–100)
LEUKOCYTE ESTERASE UR QL STRIP: ABNORMAL
LYMPHOCYTES # BLD AUTO: 2.07 THOUSANDS/ΜL (ref 0.6–4.47)
LYMPHOCYTES NFR BLD AUTO: 22 % (ref 14–44)
MAGNESIUM SERPL-MCNC: 1.6 MG/DL (ref 1.6–2.6)
MCH RBC QN AUTO: 31.1 PG (ref 26.8–34.3)
MCHC RBC AUTO-ENTMCNC: 32.3 G/DL (ref 31.4–37.4)
MCV RBC AUTO: 96 FL (ref 82–98)
MICROALBUMIN UR-MCNC: 64.7 MG/L (ref 0–20)
MICROALBUMIN/CREAT 24H UR: 36 MG/G CREATININE (ref 0–30)
MONOCYTES # BLD AUTO: 0.55 THOUSAND/ΜL (ref 0.17–1.22)
MONOCYTES NFR BLD AUTO: 6 % (ref 4–12)
NEUTROPHILS # BLD AUTO: 6.32 THOUSANDS/ΜL (ref 1.85–7.62)
NEUTS SEG NFR BLD AUTO: 69 % (ref 43–75)
NITRITE UR QL STRIP: POSITIVE
NON-SQ EPI CELLS URNS QL MICRO: ABNORMAL /HPF
NRBC BLD AUTO-RTO: 0 /100 WBCS
PH UR STRIP.AUTO: 6 [PH]
PLATELET # BLD AUTO: 347 THOUSANDS/UL (ref 149–390)
PMV BLD AUTO: 11.4 FL (ref 8.9–12.7)
POTASSIUM SERPL-SCNC: 3.6 MMOL/L (ref 3.5–5.3)
PROT SERPL-MCNC: 7.2 G/DL (ref 6.4–8.2)
PROT UR STRIP-MCNC: ABNORMAL MG/DL
RBC # BLD AUTO: 4.21 MILLION/UL (ref 3.81–5.12)
RBC #/AREA URNS AUTO: ABNORMAL /HPF
SODIUM SERPL-SCNC: 137 MMOL/L (ref 136–145)
SP GR UR STRIP.AUTO: 1.02 (ref 1–1.03)
TRIGL SERPL-MCNC: 172 MG/DL
TSH SERPL DL<=0.05 MIU/L-ACNC: 2.59 UIU/ML (ref 0.36–3.74)
UROBILINOGEN UR QL STRIP.AUTO: 1 E.U./DL
WBC # BLD AUTO: 9.23 THOUSAND/UL (ref 4.31–10.16)
WBC #/AREA URNS AUTO: ABNORMAL /HPF

## 2019-06-21 PROCEDURE — 82570 ASSAY OF URINE CREATININE: CPT

## 2019-06-21 PROCEDURE — 84443 ASSAY THYROID STIM HORMONE: CPT

## 2019-06-21 PROCEDURE — 3060F POS MICROALBUMINURIA REV: CPT | Performed by: INTERNAL MEDICINE

## 2019-06-21 PROCEDURE — 36415 COLL VENOUS BLD VENIPUNCTURE: CPT

## 2019-06-21 PROCEDURE — 83036 HEMOGLOBIN GLYCOSYLATED A1C: CPT

## 2019-06-21 PROCEDURE — 80061 LIPID PANEL: CPT

## 2019-06-21 PROCEDURE — 80053 COMPREHEN METABOLIC PANEL: CPT

## 2019-06-21 PROCEDURE — 83735 ASSAY OF MAGNESIUM: CPT

## 2019-06-21 PROCEDURE — 82306 VITAMIN D 25 HYDROXY: CPT

## 2019-06-21 PROCEDURE — 82043 UR ALBUMIN QUANTITATIVE: CPT

## 2019-06-21 PROCEDURE — 83721 ASSAY OF BLOOD LIPOPROTEIN: CPT

## 2019-06-21 PROCEDURE — 81001 URINALYSIS AUTO W/SCOPE: CPT | Performed by: INTERNAL MEDICINE

## 2019-06-21 PROCEDURE — 85025 COMPLETE CBC W/AUTO DIFF WBC: CPT

## 2019-06-21 RX ORDER — PANTOPRAZOLE SODIUM 40 MG/1
TABLET, DELAYED RELEASE ORAL
Qty: 30 TABLET | Refills: 3 | Status: SHIPPED | OUTPATIENT
Start: 2019-06-21 | End: 2019-10-08 | Stop reason: SDUPTHER

## 2019-06-26 ENCOUNTER — OFFICE VISIT (OUTPATIENT)
Dept: INTERNAL MEDICINE CLINIC | Facility: CLINIC | Age: 44
End: 2019-06-26
Payer: COMMERCIAL

## 2019-06-26 VITALS
HEART RATE: 78 BPM | BODY MASS INDEX: 32.25 KG/M2 | HEIGHT: 63 IN | WEIGHT: 182 LBS | SYSTOLIC BLOOD PRESSURE: 120 MMHG | RESPIRATION RATE: 18 BRPM | TEMPERATURE: 98 F | DIASTOLIC BLOOD PRESSURE: 75 MMHG | OXYGEN SATURATION: 98 %

## 2019-06-26 DIAGNOSIS — N30.01 ACUTE CYSTITIS WITH HEMATURIA: ICD-10-CM

## 2019-06-26 DIAGNOSIS — I10 ESSENTIAL HYPERTENSION: ICD-10-CM

## 2019-06-26 DIAGNOSIS — E11.29 DIABETES MELLITUS WITH PROTEINURIA (HCC): ICD-10-CM

## 2019-06-26 DIAGNOSIS — R80.9 TYPE 2 DIABETES MELLITUS WITH MICROALBUMINURIA, WITHOUT LONG-TERM CURRENT USE OF INSULIN (HCC): ICD-10-CM

## 2019-06-26 DIAGNOSIS — R74.8 ELEVATED ALKALINE PHOSPHATASE LEVEL: Primary | ICD-10-CM

## 2019-06-26 DIAGNOSIS — E55.9 VITAMIN D DEFICIENCY: ICD-10-CM

## 2019-06-26 DIAGNOSIS — R80.9 DIABETES MELLITUS WITH PROTEINURIA (HCC): ICD-10-CM

## 2019-06-26 DIAGNOSIS — R80.1 PERSISTENT PROTEINURIA: ICD-10-CM

## 2019-06-26 DIAGNOSIS — E11.29 TYPE 2 DIABETES MELLITUS WITH MICROALBUMINURIA, WITHOUT LONG-TERM CURRENT USE OF INSULIN (HCC): ICD-10-CM

## 2019-06-26 DIAGNOSIS — G45.9 TRANSIENT ISCHEMIC ATTACK: ICD-10-CM

## 2019-06-26 DIAGNOSIS — Q45.3: ICD-10-CM

## 2019-06-26 DIAGNOSIS — R74.8 LOW SERUM HDL: ICD-10-CM

## 2019-06-26 DIAGNOSIS — I10 BENIGN ESSENTIAL HYPERTENSION: ICD-10-CM

## 2019-06-26 PROBLEM — H60.90 OTITIS EXTERNA: Status: RESOLVED | Noted: 2019-05-06 | Resolved: 2019-06-26

## 2019-06-26 PROBLEM — H65.91 RIGHT NON-SUPPURATIVE OTITIS MEDIA: Status: RESOLVED | Noted: 2019-05-03 | Resolved: 2019-06-26

## 2019-06-26 PROBLEM — D72.823 LEUKEMOID REACTION: Status: RESOLVED | Noted: 2018-02-28 | Resolved: 2019-06-26

## 2019-06-26 PROBLEM — H61.21 IMPACTED CERUMEN OF RIGHT EAR: Status: RESOLVED | Noted: 2019-05-03 | Resolved: 2019-06-26

## 2019-06-26 PROBLEM — F32.9 REACTIVE DEPRESSION (SITUATIONAL): Status: RESOLVED | Noted: 2018-02-23 | Resolved: 2019-06-26

## 2019-06-26 PROCEDURE — 3074F SYST BP LT 130 MM HG: CPT | Performed by: INTERNAL MEDICINE

## 2019-06-26 PROCEDURE — 3078F DIAST BP <80 MM HG: CPT | Performed by: INTERNAL MEDICINE

## 2019-06-26 PROCEDURE — 1036F TOBACCO NON-USER: CPT | Performed by: INTERNAL MEDICINE

## 2019-06-26 PROCEDURE — 99214 OFFICE O/P EST MOD 30 MIN: CPT | Performed by: INTERNAL MEDICINE

## 2019-06-26 PROCEDURE — 3066F NEPHROPATHY DOC TX: CPT | Performed by: INTERNAL MEDICINE

## 2019-06-26 RX ORDER — DULAGLUTIDE 1.5 MG/.5ML
INJECTION, SOLUTION SUBCUTANEOUS
Refills: 1 | COMMUNITY
Start: 2019-06-22

## 2019-06-26 RX ORDER — ATORVASTATIN CALCIUM 20 MG/1
20 TABLET, FILM COATED ORAL
Refills: 2 | COMMUNITY
Start: 2019-05-30 | End: 2021-02-23 | Stop reason: SDUPTHER

## 2019-06-26 RX ORDER — NITROFURANTOIN 25; 75 MG/1; MG/1
100 CAPSULE ORAL 2 TIMES DAILY
Qty: 10 CAPSULE | Refills: 0 | Status: SHIPPED | OUTPATIENT
Start: 2019-06-26 | End: 2019-07-01

## 2019-08-11 DIAGNOSIS — J45.909 UNCOMPLICATED ASTHMA, UNSPECIFIED ASTHMA SEVERITY, UNSPECIFIED WHETHER PERSISTENT: ICD-10-CM

## 2019-08-12 RX ORDER — MONTELUKAST SODIUM 10 MG/1
TABLET ORAL
Qty: 15 TABLET | Refills: 5 | Status: SHIPPED | OUTPATIENT
Start: 2019-08-12 | End: 2019-11-14 | Stop reason: SDUPTHER

## 2019-10-08 DIAGNOSIS — E66.3 OVERWEIGHT: ICD-10-CM

## 2019-10-08 RX ORDER — PANTOPRAZOLE SODIUM 40 MG/1
TABLET, DELAYED RELEASE ORAL
Qty: 30 TABLET | Refills: 3 | Status: SHIPPED | OUTPATIENT
Start: 2019-10-08 | End: 2020-01-30

## 2019-11-13 LAB — NEGATIVE CONTROL: NEGATIVE

## 2019-11-14 DIAGNOSIS — J45.909 UNCOMPLICATED ASTHMA, UNSPECIFIED ASTHMA SEVERITY, UNSPECIFIED WHETHER PERSISTENT: ICD-10-CM

## 2019-11-18 RX ORDER — MONTELUKAST SODIUM 10 MG/1
TABLET ORAL
Qty: 15 TABLET | Refills: 5 | Status: SHIPPED | OUTPATIENT
Start: 2019-11-18 | End: 2020-04-24

## 2019-11-19 ENCOUNTER — TELEPHONE (OUTPATIENT)
Dept: INTERNAL MEDICINE CLINIC | Facility: CLINIC | Age: 44
End: 2019-11-19

## 2019-11-19 NOTE — TELEPHONE ENCOUNTER
Pain in shoulder to neck joint    For a few days     Today swollen some  Please advise on necessity of appt for shai or not?

## 2019-11-20 ENCOUNTER — HOSPITAL ENCOUNTER (OUTPATIENT)
Dept: RADIOLOGY | Facility: HOSPITAL | Age: 44
Discharge: HOME | End: 2019-11-20
Attending: INTERNAL MEDICINE
Payer: COMMERCIAL

## 2019-11-20 ENCOUNTER — OFFICE VISIT (OUTPATIENT)
Dept: INTERNAL MEDICINE CLINIC | Facility: CLINIC | Age: 44
End: 2019-11-20
Payer: COMMERCIAL

## 2019-11-20 VITALS
SYSTOLIC BLOOD PRESSURE: 120 MMHG | TEMPERATURE: 97.7 F | WEIGHT: 158.4 LBS | RESPIRATION RATE: 16 BRPM | BODY MASS INDEX: 28.07 KG/M2 | OXYGEN SATURATION: 99 % | HEART RATE: 96 BPM | DIASTOLIC BLOOD PRESSURE: 70 MMHG | HEIGHT: 63 IN

## 2019-11-20 DIAGNOSIS — M25.512 ACUTE PAIN OF LEFT SHOULDER: Primary | ICD-10-CM

## 2019-11-20 DIAGNOSIS — M25.512 ACUTE PAIN OF LEFT SHOULDER: ICD-10-CM

## 2019-11-20 PROBLEM — D22.9 MULTIPLE BENIGN NEVI: Status: ACTIVE | Noted: 2019-11-20

## 2019-11-20 PROBLEM — N81.9 PROLAPSE OF FEMALE PELVIC ORGANS: Status: ACTIVE | Noted: 2019-11-20

## 2019-11-20 PROBLEM — R20.2 PARESTHESIA: Status: ACTIVE | Noted: 2019-11-20

## 2019-11-20 PROBLEM — E78.5 HYPERLIPIDEMIA, UNSPECIFIED: Status: ACTIVE | Noted: 2019-11-20

## 2019-11-20 PROBLEM — R31.29 MICROSCOPIC HEMATURIA: Status: ACTIVE | Noted: 2019-11-20

## 2019-11-20 PROBLEM — R47.89 WORD FINDING DIFFICULTY: Status: ACTIVE | Noted: 2019-11-20

## 2019-11-20 PROBLEM — R55 SYNCOPE: Status: ACTIVE | Noted: 2019-11-20

## 2019-11-20 PROBLEM — N93.9 VAGINA BLEEDING: Status: ACTIVE | Noted: 2019-11-20

## 2019-11-20 PROBLEM — R31.9 HEMATURIA: Status: ACTIVE | Noted: 2019-11-20

## 2019-11-20 PROCEDURE — 1036F TOBACCO NON-USER: CPT | Performed by: INTERNAL MEDICINE

## 2019-11-20 PROCEDURE — 73030 X-RAY EXAM OF SHOULDER: CPT

## 2019-11-20 PROCEDURE — 99213 OFFICE O/P EST LOW 20 MIN: CPT | Performed by: INTERNAL MEDICINE

## 2019-11-20 RX ORDER — PHENTERMINE HYDROCHLORIDE 37.5 MG/1
37.5 CAPSULE ORAL EVERY MORNING
COMMUNITY

## 2019-11-20 RX ORDER — MELOXICAM 15 MG/1
15 TABLET ORAL DAILY
Qty: 14 TABLET | Refills: 2 | Status: SHIPPED | OUTPATIENT
Start: 2019-11-20 | End: 2020-01-13 | Stop reason: ALTCHOICE

## 2019-11-20 RX ORDER — TOPIRAMATE 25 MG/1
25 TABLET ORAL 2 TIMES DAILY
COMMUNITY
End: 2020-07-20 | Stop reason: DRUGHIGH

## 2019-11-20 NOTE — PATIENT INSTRUCTIONS
patient with acute left shoulder pain a lot of muscle spasm suspect rotator cuff tendinitis will be getting x-ray of the left shoulder no history of trauma will use meloxicam for short period time 15 mg has been severely allergic to muscle relaxers in the past to use ice 3 to 4 times a day and heat in the morning will refer to Sports Medicine may add benefit from cortisone injection for supraspinatus tendinitis or other

## 2019-11-20 NOTE — LETTER
November 20, 2019     Patient: Kiara Cuenca   YOB: 1975   Date of Visit: 11/20/2019       To Whom it May Concern:    Rachel Stringer is under my professional care  She was seen in my office on 11/20/2019  She may return to work on November 22nd 2019  If you have any questions or concerns, please don't hesitate to call           Sincerely,          Mario Rogers DO        CC: No Recipients

## 2019-11-20 NOTE — PROGRESS NOTES
Assessment/Plan:    1 patient with acute left shoulder pain a lot of muscle spasm suspect rotator cuff tendinitis will be getting x-ray of the left shoulder no history of trauma will use meloxicam for short period time 15 mg has been severely allergic to muscle relaxers in the past to use ice 3 to 4 times a day and heat in the morning will refer to Sports Medicine may add benefit from cortisone injection for supraspinatus tendinitis or other           Diagnoses and all orders for this visit:    Acute pain of left shoulder  -     XR shoulder 2+ vw left; Future  -     Ambulatory referral to Sports Medicine; Future  -     meloxicam (MOBIC) 15 mg tablet; Take 1 tablet (15 mg total) by mouth daily    Other orders  -     phentermine 37 5 MG capsule; Take 37 5 mg by mouth every morning  -     topiramate (TOPAMAX) 25 mg tablet; Take 25 mg by mouth 2 (two) times a day        The patient was counseled regarding instructions for management, risk factor reductions, patient and family education,impressions, risks and benefits of treatment options, side effects of medications, importance of compliance with treatment  The treatment plan was reviewed with the patient/guardian and patient/guardian understands and agrees with the treatment plan              Current Outpatient Medications:     ACCU-CHEK FASTCLIX LANCETS MISC, TEST 2 TIMES A DAY, Disp: , Rfl:     albuterol (PROVENTIL HFA,VENTOLIN HFA) 90 mcg/act inhaler, Inhale 2 puffs every 6 (six) hours as needed for wheezing, Disp: , Rfl:     aspirin 81 MG tablet, Take 1 tablet (81 mg total) by mouth daily, Disp: 100 tablet, Rfl: 2    atorvastatin (LIPITOR) 20 mg tablet, Take 20 mg by mouth daily at bedtime, Disp: , Rfl: 2    ergocalciferol (VITAMIN D2) 50,000 units, Take 1 capsule (50,000 Units total) by mouth once a week, Disp: 12 capsule, Rfl: 2    glucagon (GLUCAGON EMERGENCY) 1 MG injection, Inject as directed, Disp: , Rfl:     glucose blood (ACCU-CHEK GLORIA) test strip, by In Vitro route 4 (four) times a day, Disp: , Rfl:     levothyroxine 75 mcg tablet, Take 75 mcg by mouth daily, Disp: , Rfl:     lisinopril (ZESTRIL) 2 5 mg tablet, Take 1 tablet (2 5 mg total) by mouth daily, Disp: 90 tablet, Rfl: 2    metFORMIN (GLUCOPHAGE-XR) 750 mg 24 hr tablet, TAKE 1 TABLET BY MOUTH 3 TIMES A DAY AFTER MEALS, Disp: 90 tablet, Rfl: 3    montelukast (SINGULAIR) 10 mg tablet, TAKE 1 TABLET BY MOUTH EVERYDAY AT BEDTIME, Disp: 15 tablet, Rfl: 5    pantoprazole (PROTONIX) 40 mg tablet, TAKE 1 TABLET BY MOUTH EVERY DAY, Disp: 30 tablet, Rfl: 3    phentermine 37 5 MG capsule, Take 37 5 mg by mouth every morning, Disp: , Rfl:     topiramate (TOPAMAX) 25 mg tablet, Take 25 mg by mouth 2 (two) times a day, Disp: , Rfl:     TRULICITY 1 5 TK/9 8LJ SOPN, INJECT SUBCUTANEOUSLY 1 5MG EVERY WEEK, Disp: , Rfl: 1    meloxicam (MOBIC) 15 mg tablet, Take 1 tablet (15 mg total) by mouth daily, Disp: 14 tablet, Rfl: 2    Subjective:      Patient ID: Billy Holley is a 38 y o  female  3 days ago left shoulder and upper back constant pain, laying down moving left arm worsens pain noticed lump left shoulder, at worst pain level 8 at best at 6,       The following portions of the patient's history were reviewed and updated as appropriate:   She has a past medical history of Angina pectoris ([address] ), Anxiety, Atypical chest pain, Diabetes mellitus ([address] ), Disease of thyroid gland, Endometriosis, Hypercalcemia, Hyperlipidemia, Hyponatremia, Metrorrhagia, Microscopic hematuria, Obesity, PONV (postoperative nausea and vomiting), Proteinuria, Shortness of breath, and TIA (transient ischemic attack)  ,  does not have any pertinent problems on file  ,   has a past surgical history that includes Ovarian cyst removal (Right); Hysterectomy; pr cystourethroscopy,fulgur <0 5 cm lesn (N/A, 3/23/2017); Laparoscopic cholecystectomy; and Oophorectomy  ,  family history includes Alcohol abuse in her father;  Anxiety disorder in her daughter and mother; Arthritis in her cousin, maternal aunt, mother, and paternal aunt; Asthma in her sister; Breast cancer in her maternal aunt, paternal aunt, and sister; Colon cancer in her maternal grandmother and paternal grandmother; Depression in her child, maternal aunt, mother, and son; Diabetes in her maternal aunt, maternal uncle, mother, paternal aunt, paternal uncle, and sister; Heart attack in her mother; Heart disease in her mother; Hyperlipidemia in her father and mother; Hypertension in her cousin, father, maternal aunt, mother, and paternal aunt; Melanoma in her sister; Substance Abuse in her cousin  ,   reports that she has never smoked  She has never used smokeless tobacco  She reports that she does not drink alcohol or use drugs  ,  is allergic to cephalosporins; norflex [orphenadrine]; rocephin [ceftriaxone]; sulfa antibiotics; and ultracet [tramadol-acetaminophen]       Review of Systems   Constitutional: Negative for appetite change, chills, fatigue, fever and unexpected weight change  HENT: Negative for congestion, ear pain, facial swelling, hearing loss, mouth sores, nosebleeds, postnasal drip, rhinorrhea, sinus pain, sore throat, trouble swallowing and voice change  Eyes: Negative for pain, discharge, redness and visual disturbance  Respiratory: Negative for apnea, chest tightness, shortness of breath, wheezing and stridor  Cardiovascular: Negative for chest pain, palpitations and leg swelling  Gastrointestinal: Negative for abdominal distention, abdominal pain, blood in stool, constipation, diarrhea and vomiting  Endocrine: Negative for cold intolerance, heat intolerance, polydipsia, polyphagia and polyuria  Genitourinary: Negative for difficulty urinating, dysuria, flank pain, frequency, genital sores, hematuria and urgency  Musculoskeletal: Positive for arthralgias, neck pain and neck stiffness  Negative for back pain  Skin: Negative for rash and wound  Allergic/Immunologic: Negative for environmental allergies, food allergies and immunocompromised state  Neurological: Negative for dizziness, tremors, seizures, syncope, facial asymmetry, speech difficulty, weakness, light-headedness, numbness and headaches  Hematological: Negative for adenopathy  Does not bruise/bleed easily  Psychiatric/Behavioral: Negative for agitation, behavioral problems, dysphoric mood, hallucinations, self-injury, sleep disturbance and suicidal ideas  The patient is not hyperactive            Objective:  /70 (BP Location: Right arm, Patient Position: Sitting)   Pulse 96   Temp 97 7 °F (36 5 °C) (Tympanic)   Resp 16   Ht 5' 3" (1 6 m)   Wt 71 8 kg (158 lb 6 4 oz)   SpO2 99%   BMI 28 06 kg/m²     Lab Review  Appointment on 06/21/2019   Component Date Value    WBC 06/21/2019 9 23     RBC 06/21/2019 4 21     Hemoglobin 06/21/2019 13 1     Hematocrit 06/21/2019 40 5     MCV 06/21/2019 96     MCH 06/21/2019 31 1     MCHC 06/21/2019 32 3     RDW 06/21/2019 13 3     MPV 06/21/2019 11 4     Platelets 76/21/9858 347     nRBC 06/21/2019 0     Neutrophils Relative 06/21/2019 69     Immat GRANS % 06/21/2019 0     Lymphocytes Relative 06/21/2019 22     Monocytes Relative 06/21/2019 6     Eosinophils Relative 06/21/2019 2     Basophils Relative 06/21/2019 1     Neutrophils Absolute 06/21/2019 6 32     Immature Grans Absolute 06/21/2019 0 04     Lymphocytes Absolute 06/21/2019 2 07     Monocytes Absolute 06/21/2019 0 55     Eosinophils Absolute 06/21/2019 0 17     Basophils Absolute 06/21/2019 0 08     Sodium 06/21/2019 137     Potassium 06/21/2019 3 6     Chloride 06/21/2019 105     CO2 06/21/2019 27     ANION GAP 06/21/2019 5     BUN 06/21/2019 10     Creatinine 06/21/2019 0 60     Glucose, Fasting 06/21/2019 112*    Calcium 06/21/2019 9 3     AST 06/21/2019 20     ALT 06/21/2019 56     Alkaline Phosphatase 06/21/2019 118*    Total Protein 06/21/2019 7  2     Albumin 06/21/2019 3 8     Total Bilirubin 06/21/2019 0 33     eGFR 06/21/2019 113     Hemoglobin A1C 06/21/2019 6 6*    EAG 06/21/2019 143     LDL Direct 06/21/2019 62     Cholesterol 06/21/2019 118     Triglycerides 06/21/2019 172*    HDL, Direct 06/21/2019 30*    LDL Calculated 06/21/2019 54     Vit D, 25-Hydroxy 06/21/2019 55 7     TSH 3RD GENERATON 06/21/2019 2 590     Magnesium 06/21/2019 1 6     Creatinine, Ur 06/21/2019 182 0     Microalbum  ,U,Random 06/21/2019 64 7*    Microalb Creat Ratio 06/21/2019 36*         Imaging  @[id number]@     XR shoulder 2+ vw left    (Results Pending)     No results found for this or any previous visit  Physical Exam   Constitutional: She is oriented to person, place, and time  She appears well-developed  HENT:   Right Ear: External ear normal    Left Ear: External ear normal    Eyes: Right eye exhibits no discharge  Left eye exhibits no discharge  No scleral icterus  Neck: Carotid bruit is not present  No tracheal deviation present  No thyroid mass and no thyromegaly present  Cardiovascular: Normal rate, regular rhythm, normal heart sounds and intact distal pulses  Exam reveals no gallop and no friction rub  No murmur heard  Pulmonary/Chest: No respiratory distress  She has no wheezes  She has no rales  Musculoskeletal: She exhibits no edema  Only able to abduct the left shoulder to 90° with significant pain abduction of left shoulder worse no pain over the left biceps tendon has significant pain over the supraspinatus tendon some mild pain [facility] joint on the left side   Lymphadenopathy:     She has no cervical adenopathy  Neurological: She is alert and oriented to person, place, and time  Coordination normal    Psychiatric: She has a normal mood and affect  Her behavior is normal  Judgment and thought content normal    Nursing note and vitals reviewed

## 2019-11-21 ENCOUNTER — TELEPHONE (OUTPATIENT)
Dept: OBGYN CLINIC | Facility: CLINIC | Age: 44
End: 2019-11-21

## 2019-11-21 NOTE — TELEPHONE ENCOUNTER
Kelsea from Dr Heriberto Rivera office left a message in regards to Pastora Gomez needing an appointment with Dr Jatin BUTLER for acute left shoulder pain with a lump on her neck  I asked Dr Filippo Ruano if Dr Jatin Land would see this, he said yes  I called Dr Heriberto Rivera office to see if there was any specifications on dates and Merle Martinez said CARRIE Land had an 11:30am Friday November 22nd opened, as which I scheduled the patient  Called patient and I left a message stating I scheduled her for this date and time  I provided her with the address as well  I will call patient later on today to try and get hold of her to confirm appointment      (I am taking messages from phone room voicemail box, I apologize for any errors or unconvince)

## 2019-12-02 ENCOUNTER — OFFICE VISIT (OUTPATIENT)
Dept: OBGYN CLINIC | Facility: CLINIC | Age: 44
End: 2019-12-02
Payer: COMMERCIAL

## 2019-12-02 VITALS
SYSTOLIC BLOOD PRESSURE: 113 MMHG | HEART RATE: 91 BPM | WEIGHT: 164 LBS | DIASTOLIC BLOOD PRESSURE: 77 MMHG | BODY MASS INDEX: 29.06 KG/M2 | HEIGHT: 63 IN

## 2019-12-02 DIAGNOSIS — M62.838 TRAPEZIUS MUSCLE SPASM: ICD-10-CM

## 2019-12-02 DIAGNOSIS — S46.012A ROTATOR CUFF STRAIN, LEFT, INITIAL ENCOUNTER: ICD-10-CM

## 2019-12-02 DIAGNOSIS — M75.42 SUBACROMIAL IMPINGEMENT OF LEFT SHOULDER: Primary | ICD-10-CM

## 2019-12-02 DIAGNOSIS — S46.212A BICEPS STRAIN, LEFT, INITIAL ENCOUNTER: ICD-10-CM

## 2019-12-02 PROCEDURE — 99243 OFF/OP CNSLTJ NEW/EST LOW 30: CPT | Performed by: FAMILY MEDICINE

## 2019-12-02 RX ORDER — NAPROXEN 500 MG/1
500 TABLET ORAL 2 TIMES DAILY WITH MEALS
Qty: 30 TABLET | Refills: 0 | Status: SHIPPED | OUTPATIENT
Start: 2019-12-02 | End: 2021-09-10

## 2019-12-02 NOTE — PROGRESS NOTES
Assessment/Plan:  Assessment/Plan   Diagnoses and all orders for this visit:    Subacromial impingement of left shoulder  -     naproxen (NAPROSYN) 500 mg tablet; Take 1 tablet (500 mg total) by mouth 2 (two) times a day with meals  -     Ambulatory referral to Physical Therapy; Future    Rotator cuff strain, left, initial encounter  -     naproxen (NAPROSYN) 500 mg tablet; Take 1 tablet (500 mg total) by mouth 2 (two) times a day with meals  -     Ambulatory referral to Physical Therapy; Future    Biceps strain, left, initial encounter  -     naproxen (NAPROSYN) 500 mg tablet; Take 1 tablet (500 mg total) by mouth 2 (two) times a day with meals  -     Ambulatory referral to Physical Therapy; Future    Trapezius muscle spasm  -     Ambulatory referral to Physical Therapy; Future      43-year-old right-hand-dominant female with left shoulder pain more than 2 weeks duration  Discussed with patient physical exam, radiographs, impression, and plan  X-rays of the left shoulder are unremarkable for acute osseous abnormality  Physical exam of cervical spine is unremarkable for midline or paraspinal tenderness  She has normal range of motion of the cervical spine  There is negative axial load and negative Spurling's  Left shoulder has tenderness upon palpation of the trapezius superior aspect and transverse aspect at the medial periscapular border, tenderness of the lateral subacromial aspect of the shoulder, tenderness of the infraspinatus and biceps tendon  She has range of motion limited to forward flexion of 120°, abduction 90°, and internal rotation to lumbar spine  She has 4+/5 external rotation, positive empty can test, and positive Garcia maneuver  She has normal strength and biceps reflex both upper extremities  She has normal sensation both upper extremities    Clinical impression that she may be symptomatic from inflammatory process cervical bursitis versus rotator cuff tendinitis and experiencing rective muscle spasms  I discussed treatment regimen of anti-inflammatory, supplements, corticosteroid injection, and physical therapy to which she agreed  I administered mixture of 4 cc 1% lidocaine and 1 cc Kenalog to the left shoulder subacromial space without complication  She was advised to closely monitor her blood sugars for the next week, as corticosteroid can cause elevation of blood sugar  She is to discontinue meloxicam start taking naproxen 500 mg twice daily food consistently 2 weeks, take tumeric supplement 500 mg twice daily, and start physical therapy as soon as possible and do home exercises as directed  She will follow up in 6 weeks at which point she will be re-evaluated  Subjective:   Patient ID: Adrianna Ocampo is a 38 y o  female  Chief Complaint   Patient presents with    Left Shoulder - Pain       60-year-old right-hand-dominant female presents for evaluation of left shoulder pain of more than 2 weeks duration  She denies any trauma or inciting event  Pain described as sudden onset, generalized to the shoulder worse at the superior and lateral aspects, achy and sometimes sharp, radiating to the medial periscapular aspect and sometimes distally to the hand, associated numbness and tingling in left upper extremity, worse with elevating the arm and movement of the arm, and improved with return to neutral position  She denies any pain while in resting position  She was seen by primary care provider and started on meloxicam   She was also referred for x-rays and then referred to orthopedic care  Shoulder Pain   This is a new problem  The current episode started 1 to 4 weeks ago  The problem occurs intermittently  The problem has been unchanged  Associated symptoms include arthralgias and numbness  Pertinent negatives include no abdominal pain, chest pain, chills, fever, joint swelling, rash, sore throat or weakness  Exacerbated by: Arm movement   She has tried rest and NSAIDs for the symptoms  The treatment provided no relief  The following portions of the patient's history were reviewed and updated as appropriate: She  has a past medical history of Angina pectoris ([address] ), Anxiety, Atypical chest pain, Diabetes mellitus ([address] ), Disease of thyroid gland, Endometriosis, Hypercalcemia, Hyperlipidemia, Hyponatremia, Metrorrhagia, Microscopic hematuria, Obesity, PONV (postoperative nausea and vomiting), Proteinuria, Shortness of breath, and TIA (transient ischemic attack)  She  has a past surgical history that includes Ovarian cyst removal (Right); Hysterectomy; pr cystourethroscopy,fulgur <0 5 cm lesn (N/A, 3/23/2017); Laparoscopic cholecystectomy; and Oophorectomy  Her family history includes Alcohol abuse in her father; Anxiety disorder in her daughter and mother; Arthritis in her cousin, maternal aunt, mother, and paternal aunt; Asthma in her sister; Breast cancer in her maternal aunt, paternal aunt, and sister; Colon cancer in her maternal grandmother and paternal grandmother; Depression in her child, maternal aunt, mother, and son; Diabetes in her maternal aunt, maternal uncle, mother, paternal aunt, paternal uncle, and sister; Heart attack in her mother; Heart disease in her mother; Hyperlipidemia in her father and mother; Hypertension in her cousin, father, maternal aunt, mother, and paternal aunt; Melanoma in her sister; Substance Abuse in her cousin  She  reports that she has never smoked  She has never used smokeless tobacco  She reports that she does not drink alcohol or use drugs  She is allergic to cephalosporins; norflex [orphenadrine]; rocephin [ceftriaxone]; sulfa antibiotics; and ultracet [tramadol-acetaminophen]       Review of Systems   Constitutional: Negative for chills and fever  HENT: Negative for sore throat  Eyes: Negative for visual disturbance  Respiratory: Negative for shortness of breath  Cardiovascular: Negative for chest pain  Gastrointestinal: Negative for abdominal pain  Genitourinary: Negative for flank pain  Musculoskeletal: Positive for arthralgias  Negative for joint swelling  Skin: Negative for rash and wound  Neurological: Positive for numbness  Negative for weakness  Hematological: Does not bruise/bleed easily  Psychiatric/Behavioral: Negative for self-injury  Objective:  Vitals:    12/02/19 1109   BP: 113/77   Pulse: 91   Weight: 74 4 kg (164 lb)   Height: 5' 3" (1 6 m)     Back Exam     Reflexes   Biceps: normal    Comments:    Cervical spine  -no tenderness  -normal range of motion  -negative Spurling's  -negative axial load  -normal sensation and biceps reflex both upper extremities      Right Hand Exam     Muscle Strength   The patient has normal right wrist strength  Other   Sensation: normal  Pulse: present      Left Hand Exam     Muscle Strength   The patient has normal left wrist strength  Other   Sensation: normal  Pulse: present      Right Elbow Exam     Comments:  5/5 strength flexion and extension      Left Elbow Exam     Tenderness   The patient is experiencing no tenderness  Range of Motion   The patient has normal left elbow ROM  Muscle Strength   The patient has normal left elbow strength  Other   Sensation: normal    Comments:  5/5 strength flexion and extension      Right Shoulder Exam     Muscle Strength   Abduction: 5/5     Other   Sensation: normal      Left Shoulder Exam     Tenderness   The patient is experiencing tenderness in the biceps tendon (Trapezius, lateral subacromial, medial periscapular)      Range of Motion   Active abduction: 90   Forward flexion: 120   Internal rotation 0 degrees: Lumbar     Muscle Strength   Abduction: 5/5   Internal rotation: 5/5   Biceps: 5/5     Tests   Garcia test: positive    Other   Sensation: normal     Comments:  Negative belly press  Negative push-off  Positive empty can  4+/5 strength external rotation and supraspinatus          Strength/Myotome Testing     Left Wrist/Hand   Normal wrist strength    Right Wrist/Hand   Normal wrist strength      Physical Exam   Constitutional: She is oriented to person, place, and time  She appears well-developed  No distress  HENT:   Head: Normocephalic  Eyes: Conjunctivae are normal    Neck: No tracheal deviation present  Cardiovascular: Normal rate  Pulmonary/Chest: Effort normal  No respiratory distress  Abdominal: She exhibits no distension  Neurological: She is alert and oriented to person, place, and time  Skin: Skin is warm and dry  Psychiatric: She has a normal mood and affect  Her behavior is normal    Nursing note and vitals reviewed  I have personally reviewed pertinent films in PACS and my interpretation is No acute osseous abnormality of the left shoulder

## 2019-12-02 NOTE — LETTER
December 2, 2019     Gomez Organ, DO  [address]    Patient: Guillermo Lees   YOB: 1975   Date of Visit: 12/2/2019       Dear Dr Salome Navsa: Thank you for referring Piotr Peres to me for evaluation  Below are my notes for this consultation  If you have questions, please do not hesitate to call me  I look forward to following your patient along with you  Sincerely,        [facility], DO        CC: No Recipients  [facility], DO  12/2/2019 12:28 PM  Sign at close encounter  Assessment/Plan:  Assessment/Plan   Diagnoses and all orders for this visit:    Subacromial impingement of left shoulder  -     naproxen (NAPROSYN) 500 mg tablet; Take 1 tablet (500 mg total) by mouth 2 (two) times a day with meals  -     Ambulatory referral to Physical Therapy; Future    Rotator cuff strain, left, initial encounter  -     naproxen (NAPROSYN) 500 mg tablet; Take 1 tablet (500 mg total) by mouth 2 (two) times a day with meals  -     Ambulatory referral to Physical Therapy; Future    Biceps strain, left, initial encounter  -     naproxen (NAPROSYN) 500 mg tablet; Take 1 tablet (500 mg total) by mouth 2 (two) times a day with meals  -     Ambulatory referral to Physical Therapy; Future    Trapezius muscle spasm  -     Ambulatory referral to Physical Therapy; Future      43-year-old right-hand-dominant female with left shoulder pain more than 2 weeks duration  Discussed with patient physical exam, radiographs, impression, and plan  X-rays of the left shoulder are unremarkable for acute osseous abnormality  Physical exam of cervical spine is unremarkable for midline or paraspinal tenderness  She has normal range of motion of the cervical spine  There is negative axial load and negative Spurling's    Left shoulder has tenderness upon palpation of the trapezius superior aspect and transverse aspect at the medial periscapular border, tenderness of the lateral subacromial aspect of the shoulder, tenderness of the infraspinatus and biceps tendon  She has range of motion limited to forward flexion of 120°, abduction 90°, and internal rotation to lumbar spine  She has 4+/5 external rotation, positive empty can test, and positive Garcia maneuver  She has normal strength and biceps reflex both upper extremities  She has normal sensation both upper extremities  Clinical impression that she may be symptomatic from inflammatory process cervical bursitis versus rotator cuff tendinitis and experiencing rective muscle spasms  I discussed treatment regimen of anti-inflammatory, supplements, corticosteroid injection, and physical therapy to which she agreed  I administered mixture of 4 cc 1% lidocaine and 1 cc Kenalog to the left shoulder subacromial space without complication  She was advised to closely monitor her blood sugars for the next week, as corticosteroid can cause elevation of blood sugar  She is to discontinue meloxicam start taking naproxen 500 mg twice daily food consistently 2 weeks, take tumeric supplement 500 mg twice daily, and start physical therapy as soon as possible and do home exercises as directed  She will follow up in 6 weeks at which point she will be re-evaluated  Subjective:   Patient ID: Billy Holley is a 38 y o  female  Chief Complaint   Patient presents with    Left Shoulder - Pain       71-year-old right-hand-dominant female presents for evaluation of left shoulder pain of more than 2 weeks duration  She denies any trauma or inciting event  Pain described as sudden onset, generalized to the shoulder worse at the superior and lateral aspects, achy and sometimes sharp, radiating to the medial periscapular aspect and sometimes distally to the hand, associated numbness and tingling in left upper extremity, worse with elevating the arm and movement of the arm, and improved with return to neutral position    She denies any pain while in resting position  She was seen by primary care provider and started on meloxicam   She was also referred for x-rays and then referred to orthopedic care  Shoulder Pain   This is a new problem  The current episode started 1 to 4 weeks ago  The problem occurs intermittently  The problem has been unchanged  Associated symptoms include arthralgias and numbness  Pertinent negatives include no abdominal pain, chest pain, chills, fever, joint swelling, rash, sore throat or weakness  Exacerbated by: Arm movement  She has tried rest and NSAIDs for the symptoms  The treatment provided no relief  The following portions of the patient's history were reviewed and updated as appropriate: She  has a past medical history of Angina pectoris ([address] ), Anxiety, Atypical chest pain, Diabetes mellitus ([facility] [address] ), Disease of thyroid gland, Endometriosis, Hypercalcemia, Hyperlipidemia, Hyponatremia, Metrorrhagia, Microscopic hematuria, Obesity, PONV (postoperative nausea and vomiting), Proteinuria, Shortness of breath, and TIA (transient ischemic attack)  She  has a past surgical history that includes Ovarian cyst removal (Right); Hysterectomy; pr cystourethroscopy,fulgur <0 5 cm sarika (N/A, 3/23/2017); Laparoscopic cholecystectomy; and Oophorectomy  Her family history includes Alcohol abuse in her father; Anxiety disorder in her daughter and mother; Arthritis in her cousin, maternal aunt, mother, and paternal aunt; Asthma in her sister; Breast cancer in her maternal aunt, paternal aunt, and sister; Colon cancer in her maternal grandmother and paternal grandmother; Depression in her child, maternal aunt, mother, and son; Diabetes in her maternal aunt, maternal uncle, mother, paternal aunt, paternal uncle, and sister;  Heart attack in her mother; Heart disease in her mother; Hyperlipidemia in her father and mother; Hypertension in her cousin, father, maternal aunt, mother, and paternal aunt; Melanoma in her sister; Substance Abuse in her cousin  She  reports that she has never smoked  She has never used smokeless tobacco  She reports that she does not drink alcohol or use drugs  She is allergic to cephalosporins; norflex [orphenadrine]; rocephin [ceftriaxone]; sulfa antibiotics; and ultracet [tramadol-acetaminophen]       Review of Systems   Constitutional: Negative for chills and fever  HENT: Negative for sore throat  Eyes: Negative for visual disturbance  Respiratory: Negative for shortness of breath  Cardiovascular: Negative for chest pain  Gastrointestinal: Negative for abdominal pain  Genitourinary: Negative for flank pain  Musculoskeletal: Positive for arthralgias  Negative for joint swelling  Skin: Negative for rash and wound  Neurological: Positive for numbness  Negative for weakness  Hematological: Does not bruise/bleed easily  Psychiatric/Behavioral: Negative for self-injury  Objective:  Vitals:    12/02/19 1109   BP: 113/77   Pulse: 91   Weight: 74 4 kg (164 lb)   Height: 5' 3" (1 6 m)     Back Exam     Reflexes   Biceps: normal    Comments:    Cervical spine  -no tenderness  -normal range of motion  -negative Spurling's  -negative axial load  -normal sensation and biceps reflex both upper extremities      Right Hand Exam     Muscle Strength   The patient has normal right wrist strength  Other   Sensation: normal  Pulse: present      Left Hand Exam     Muscle Strength   The patient has normal left wrist strength  Other   Sensation: normal  Pulse: present      Right Elbow Exam     Comments:  5/5 strength flexion and extension      Left Elbow Exam     Tenderness   The patient is experiencing no tenderness  Range of Motion   The patient has normal left elbow ROM  Muscle Strength   The patient has normal left elbow strength      Other   Sensation: normal    Comments:  5/5 strength flexion and extension      Right Shoulder Exam     Muscle Strength   Abduction: 5/5 Other   Sensation: normal      Left Shoulder Exam     Tenderness   The patient is experiencing tenderness in the biceps tendon (Trapezius, lateral subacromial, medial periscapular)  Range of Motion   Active abduction: 90   Forward flexion: 120   Internal rotation 0 degrees: Lumbar     Muscle Strength   Abduction: 5/5   Internal rotation: 5/5   Biceps: 5/5     Tests   Garcia test: positive    Other   Sensation: normal     Comments:  Negative belly press  Negative push-off  Positive empty can  4+/5 strength external rotation and supraspinatus          Strength/Myotome Testing     Left Wrist/Hand   Normal wrist strength    Right Wrist/Hand   Normal wrist strength      Physical Exam   Constitutional: She is oriented to person, place, and time  She appears well-developed  No distress  HENT:   Head: Normocephalic  Eyes: Conjunctivae are normal    Neck: No tracheal deviation present  Cardiovascular: Normal rate  Pulmonary/Chest: Effort normal  No respiratory distress  Abdominal: She exhibits no distension  Neurological: She is alert and oriented to person, place, and time  Skin: Skin is warm and dry  Psychiatric: She has a normal mood and affect  Her behavior is normal    Nursing note and vitals reviewed  I have personally reviewed pertinent films in PACS and my interpretation is No acute osseous abnormality of the left shoulder

## 2019-12-18 DIAGNOSIS — S46.212A BICEPS STRAIN, LEFT, INITIAL ENCOUNTER: ICD-10-CM

## 2019-12-18 DIAGNOSIS — M75.42 SUBACROMIAL IMPINGEMENT OF LEFT SHOULDER: ICD-10-CM

## 2019-12-18 DIAGNOSIS — S46.012A ROTATOR CUFF STRAIN, LEFT, INITIAL ENCOUNTER: ICD-10-CM

## 2020-01-09 ENCOUNTER — APPOINTMENT (OUTPATIENT)
Dept: LAB | Facility: CLINIC | Age: 45
End: 2020-01-09
Payer: COMMERCIAL

## 2020-01-09 DIAGNOSIS — N30.01 ACUTE CYSTITIS WITH HEMATURIA: ICD-10-CM

## 2020-01-09 DIAGNOSIS — G45.9 TRANSIENT ISCHEMIC ATTACK: ICD-10-CM

## 2020-01-09 DIAGNOSIS — R74.8 ELEVATED ALKALINE PHOSPHATASE LEVEL: ICD-10-CM

## 2020-01-09 DIAGNOSIS — R80.9 TYPE 2 DIABETES MELLITUS WITH MICROALBUMINURIA, WITHOUT LONG-TERM CURRENT USE OF INSULIN (HCC): ICD-10-CM

## 2020-01-09 DIAGNOSIS — R80.1 PERSISTENT PROTEINURIA: ICD-10-CM

## 2020-01-09 DIAGNOSIS — E55.9 VITAMIN D DEFICIENCY: ICD-10-CM

## 2020-01-09 DIAGNOSIS — Q45.3: ICD-10-CM

## 2020-01-09 DIAGNOSIS — I10 ESSENTIAL HYPERTENSION: ICD-10-CM

## 2020-01-09 DIAGNOSIS — E11.29 TYPE 2 DIABETES MELLITUS WITH MICROALBUMINURIA, WITHOUT LONG-TERM CURRENT USE OF INSULIN (HCC): ICD-10-CM

## 2020-01-09 LAB
ALBUMIN SERPL BCP-MCNC: 3.9 G/DL (ref 3.5–5)
ALP SERPL-CCNC: 94 U/L (ref 46–116)
ALT SERPL W P-5'-P-CCNC: 44 U/L (ref 12–78)
ANION GAP SERPL CALCULATED.3IONS-SCNC: 3 MMOL/L (ref 4–13)
AST SERPL W P-5'-P-CCNC: 10 U/L (ref 5–45)
BACTERIA UR QL AUTO: ABNORMAL /HPF
BASOPHILS # BLD AUTO: 0.06 THOUSANDS/ΜL (ref 0–0.1)
BASOPHILS NFR BLD AUTO: 1 % (ref 0–1)
BILIRUB SERPL-MCNC: 0.51 MG/DL (ref 0.2–1)
BILIRUB UR QL STRIP: NEGATIVE
BUN SERPL-MCNC: 12 MG/DL (ref 5–25)
CALCIUM ALBUM COR SERPL-MCNC: 10.4 MG/DL (ref 8.3–10.1)
CALCIUM SERPL-MCNC: 10.3 MG/DL (ref 8.3–10.1)
CHLORIDE SERPL-SCNC: 105 MMOL/L (ref 100–108)
CHOLEST SERPL-MCNC: 171 MG/DL (ref 50–200)
CLARITY UR: ABNORMAL
CO2 SERPL-SCNC: 28 MMOL/L (ref 21–32)
COLOR UR: YELLOW
CREAT SERPL-MCNC: 0.69 MG/DL (ref 0.6–1.3)
CREAT UR-MCNC: 187 MG/DL
EOSINOPHIL # BLD AUTO: 0.11 THOUSAND/ΜL (ref 0–0.61)
EOSINOPHIL NFR BLD AUTO: 1 % (ref 0–6)
ERYTHROCYTE [DISTWIDTH] IN BLOOD BY AUTOMATED COUNT: 13.7 % (ref 11.6–15.1)
EST. AVERAGE GLUCOSE BLD GHB EST-MCNC: 123 MG/DL
GFR SERPL CREATININE-BSD FRML MDRD: 107 ML/MIN/1.73SQ M
GLUCOSE P FAST SERPL-MCNC: 95 MG/DL (ref 65–99)
GLUCOSE UR STRIP-MCNC: NEGATIVE MG/DL
HBA1C MFR BLD: 5.9 % (ref 4.2–6.3)
HCT VFR BLD AUTO: 46 % (ref 34.8–46.1)
HDLC SERPL-MCNC: 47 MG/DL
HGB BLD-MCNC: 14.6 G/DL (ref 11.5–15.4)
HGB UR QL STRIP.AUTO: ABNORMAL
HYALINE CASTS #/AREA URNS LPF: ABNORMAL /LPF
IMM GRANULOCYTES # BLD AUTO: 0.03 THOUSAND/UL (ref 0–0.2)
IMM GRANULOCYTES NFR BLD AUTO: 0 % (ref 0–2)
KETONES UR STRIP-MCNC: NEGATIVE MG/DL
LDLC SERPL CALC-MCNC: 100 MG/DL (ref 0–100)
LDLC SERPL DIRECT ASSAY-MCNC: 94 MG/DL (ref 0–100)
LEUKOCYTE ESTERASE UR QL STRIP: NEGATIVE
LYMPHOCYTES # BLD AUTO: 1.9 THOUSANDS/ΜL (ref 0.6–4.47)
LYMPHOCYTES NFR BLD AUTO: 24 % (ref 14–44)
MAGNESIUM SERPL-MCNC: 2 MG/DL (ref 1.6–2.6)
MCH RBC QN AUTO: 30.3 PG (ref 26.8–34.3)
MCHC RBC AUTO-ENTMCNC: 31.7 G/DL (ref 31.4–37.4)
MCV RBC AUTO: 95 FL (ref 82–98)
MICROALBUMIN UR-MCNC: 31.7 MG/L (ref 0–20)
MICROALBUMIN/CREAT 24H UR: 17 MG/G CREATININE (ref 0–30)
MONOCYTES # BLD AUTO: 0.57 THOUSAND/ΜL (ref 0.17–1.22)
MONOCYTES NFR BLD AUTO: 7 % (ref 4–12)
MUCOUS THREADS UR QL AUTO: ABNORMAL
NEUTROPHILS # BLD AUTO: 5.23 THOUSANDS/ΜL (ref 1.85–7.62)
NEUTS SEG NFR BLD AUTO: 67 % (ref 43–75)
NITRITE UR QL STRIP: NEGATIVE
NON-SQ EPI CELLS URNS QL MICRO: ABNORMAL /HPF
NRBC BLD AUTO-RTO: 0 /100 WBCS
PH UR STRIP.AUTO: 6.5 [PH]
PLATELET # BLD AUTO: 368 THOUSANDS/UL (ref 149–390)
PMV BLD AUTO: 10.9 FL (ref 8.9–12.7)
POTASSIUM SERPL-SCNC: 3.8 MMOL/L (ref 3.5–5.3)
PROT SERPL-MCNC: 7.8 G/DL (ref 6.4–8.2)
PROT UR STRIP-MCNC: NEGATIVE MG/DL
RBC # BLD AUTO: 4.82 MILLION/UL (ref 3.81–5.12)
RBC #/AREA URNS AUTO: ABNORMAL /HPF
SODIUM SERPL-SCNC: 136 MMOL/L (ref 136–145)
SP GR UR STRIP.AUTO: 1.02 (ref 1–1.03)
TRIGL SERPL-MCNC: 122 MG/DL
TSH SERPL DL<=0.05 MIU/L-ACNC: 3.16 UIU/ML (ref 0.36–3.74)
UROBILINOGEN UR QL STRIP.AUTO: 0.2 E.U./DL
WBC # BLD AUTO: 7.9 THOUSAND/UL (ref 4.31–10.16)
WBC #/AREA URNS AUTO: ABNORMAL /HPF

## 2020-01-09 PROCEDURE — 80053 COMPREHEN METABOLIC PANEL: CPT

## 2020-01-09 PROCEDURE — 83721 ASSAY OF BLOOD LIPOPROTEIN: CPT

## 2020-01-09 PROCEDURE — 85025 COMPLETE CBC W/AUTO DIFF WBC: CPT

## 2020-01-09 PROCEDURE — 83036 HEMOGLOBIN GLYCOSYLATED A1C: CPT

## 2020-01-09 PROCEDURE — 82043 UR ALBUMIN QUANTITATIVE: CPT

## 2020-01-09 PROCEDURE — 3060F POS MICROALBUMINURIA REV: CPT | Performed by: NURSE PRACTITIONER

## 2020-01-09 PROCEDURE — 80061 LIPID PANEL: CPT

## 2020-01-09 PROCEDURE — 84443 ASSAY THYROID STIM HORMONE: CPT

## 2020-01-09 PROCEDURE — 3066F NEPHROPATHY DOC TX: CPT | Performed by: NURSE PRACTITIONER

## 2020-01-09 PROCEDURE — 83735 ASSAY OF MAGNESIUM: CPT

## 2020-01-09 PROCEDURE — 84080 ASSAY ALKALINE PHOSPHATASES: CPT

## 2020-01-09 PROCEDURE — 82570 ASSAY OF URINE CREATININE: CPT

## 2020-01-09 PROCEDURE — 3061F NEG MICROALBUMINURIA REV: CPT | Performed by: NURSE PRACTITIONER

## 2020-01-09 PROCEDURE — 81001 URINALYSIS AUTO W/SCOPE: CPT

## 2020-01-09 PROCEDURE — 36415 COLL VENOUS BLD VENIPUNCTURE: CPT

## 2020-01-09 PROCEDURE — 84075 ASSAY ALKALINE PHOSPHATASE: CPT

## 2020-01-10 DIAGNOSIS — E11.29 TYPE 2 DIABETES MELLITUS WITH MICROALBUMINURIA, WITHOUT LONG-TERM CURRENT USE OF INSULIN (HCC): Primary | ICD-10-CM

## 2020-01-10 DIAGNOSIS — R80.9 TYPE 2 DIABETES MELLITUS WITH MICROALBUMINURIA, WITHOUT LONG-TERM CURRENT USE OF INSULIN (HCC): Primary | ICD-10-CM

## 2020-01-11 LAB
ALP BONE CFR SERPL: 28 % (ref 14–68)
ALP INTEST CFR SERPL: 11 % (ref 0–18)
ALP LIVER CFR SERPL: 61 % (ref 18–85)
ALP SERPL-CCNC: 88 IU/L (ref 39–117)

## 2020-01-13 ENCOUNTER — OFFICE VISIT (OUTPATIENT)
Dept: OBGYN CLINIC | Facility: CLINIC | Age: 45
End: 2020-01-13
Payer: COMMERCIAL

## 2020-01-13 ENCOUNTER — OFFICE VISIT (OUTPATIENT)
Dept: INTERNAL MEDICINE CLINIC | Facility: CLINIC | Age: 45
End: 2020-01-13
Payer: COMMERCIAL

## 2020-01-13 VITALS
OXYGEN SATURATION: 99 % | BODY MASS INDEX: 27.36 KG/M2 | HEART RATE: 85 BPM | HEIGHT: 63 IN | SYSTOLIC BLOOD PRESSURE: 112 MMHG | TEMPERATURE: 97.3 F | WEIGHT: 154.4 LBS | DIASTOLIC BLOOD PRESSURE: 70 MMHG | RESPIRATION RATE: 16 BRPM

## 2020-01-13 VITALS
HEART RATE: 76 BPM | SYSTOLIC BLOOD PRESSURE: 114 MMHG | DIASTOLIC BLOOD PRESSURE: 79 MMHG | WEIGHT: 154 LBS | BODY MASS INDEX: 27.29 KG/M2 | HEIGHT: 63 IN

## 2020-01-13 DIAGNOSIS — M75.42 SUBACROMIAL IMPINGEMENT OF LEFT SHOULDER: Primary | ICD-10-CM

## 2020-01-13 DIAGNOSIS — E11.29 TYPE 2 DIABETES MELLITUS WITH MICROALBUMINURIA, WITHOUT LONG-TERM CURRENT USE OF INSULIN (HCC): Primary | ICD-10-CM

## 2020-01-13 DIAGNOSIS — E66.9 OBESITY WITH SERIOUS COMORBIDITY, UNSPECIFIED CLASSIFICATION, UNSPECIFIED OBESITY TYPE: ICD-10-CM

## 2020-01-13 DIAGNOSIS — S46.212D BICEPS STRAIN, LEFT, SUBSEQUENT ENCOUNTER: ICD-10-CM

## 2020-01-13 DIAGNOSIS — G62.9 PERIPHERAL POLYNEUROPATHY: ICD-10-CM

## 2020-01-13 DIAGNOSIS — N39.3 URINARY, INCONTINENCE, STRESS FEMALE: ICD-10-CM

## 2020-01-13 DIAGNOSIS — Z23 NEED FOR INFLUENZA VACCINATION: ICD-10-CM

## 2020-01-13 DIAGNOSIS — M62.838 TRAPEZIUS MUSCLE SPASM: ICD-10-CM

## 2020-01-13 DIAGNOSIS — S46.012D ROTATOR CUFF STRAIN, LEFT, SUBSEQUENT ENCOUNTER: ICD-10-CM

## 2020-01-13 DIAGNOSIS — I10 BENIGN ESSENTIAL HYPERTENSION: ICD-10-CM

## 2020-01-13 DIAGNOSIS — N30.01 ACUTE CYSTITIS WITH HEMATURIA: ICD-10-CM

## 2020-01-13 DIAGNOSIS — J45.20 MILD INTERMITTENT ASTHMA, UNSPECIFIED WHETHER COMPLICATED: ICD-10-CM

## 2020-01-13 DIAGNOSIS — R80.9 TYPE 2 DIABETES MELLITUS WITH MICROALBUMINURIA, WITHOUT LONG-TERM CURRENT USE OF INSULIN (HCC): Primary | ICD-10-CM

## 2020-01-13 PROCEDURE — 3008F BODY MASS INDEX DOCD: CPT | Performed by: NURSE PRACTITIONER

## 2020-01-13 PROCEDURE — 1036F TOBACCO NON-USER: CPT | Performed by: NURSE PRACTITIONER

## 2020-01-13 PROCEDURE — 90471 IMMUNIZATION ADMIN: CPT

## 2020-01-13 PROCEDURE — 99213 OFFICE O/P EST LOW 20 MIN: CPT | Performed by: FAMILY MEDICINE

## 2020-01-13 PROCEDURE — 99214 OFFICE O/P EST MOD 30 MIN: CPT | Performed by: NURSE PRACTITIONER

## 2020-01-13 PROCEDURE — 3074F SYST BP LT 130 MM HG: CPT | Performed by: NURSE PRACTITIONER

## 2020-01-13 PROCEDURE — 3078F DIAST BP <80 MM HG: CPT | Performed by: NURSE PRACTITIONER

## 2020-01-13 PROCEDURE — 90686 IIV4 VACC NO PRSV 0.5 ML IM: CPT

## 2020-01-13 RX ORDER — ERGOCALCIFEROL 1.25 MG/1
50000 CAPSULE ORAL WEEKLY
Qty: 12 CAPSULE | Refills: 2 | Status: SHIPPED | OUTPATIENT
Start: 2020-01-13 | End: 2021-02-23

## 2020-01-13 RX ORDER — GABAPENTIN 100 MG/1
CAPSULE ORAL
Qty: 90 CAPSULE | Refills: 1 | Status: SHIPPED | OUTPATIENT
Start: 2020-01-13 | End: 2020-03-13

## 2020-01-13 RX ORDER — NITROFURANTOIN 25; 75 MG/1; MG/1
100 CAPSULE ORAL 2 TIMES DAILY
Qty: 10 CAPSULE | Refills: 0 | Status: SHIPPED | OUTPATIENT
Start: 2020-01-13 | End: 2020-01-18

## 2020-01-13 RX ORDER — ALBUTEROL SULFATE 90 UG/1
2 AEROSOL, METERED RESPIRATORY (INHALATION) EVERY 6 HOURS PRN
Qty: 1 INHALER | Refills: 5 | Status: SHIPPED | OUTPATIENT
Start: 2020-01-13 | End: 2021-02-23 | Stop reason: SDUPTHER

## 2020-01-13 NOTE — LETTER
January 13, 2020     Patient: Chilo Handley   YOB: 1975   Date of Visit: 1/13/2020       To Whom it May Concern:    Christa Foote is under my professional care  She was seen in my office on 1/13/2020  She is assessed to be symptomatic from pathology in her neck and left shoulder and this is being exacerbated by conditions at work  Please make accommodation to allow changing work positions/posture to avoid maintaining some position for prolonged durations: she will benefit from having a movable work station/computer  If you have any questions or concerns, please don't hesitate to call           Sincerely,          [employer], DO        CC: No Recipients

## 2020-01-13 NOTE — PROGRESS NOTES
BMI Counseling: Body mass index is 27 35 kg/m²  The BMI is above normal  Nutrition recommendations include decreasing portion sizes, encouraging healthy choices of fruits and vegetables, decreasing fast food intake, consuming healthier snacks, limiting drinks that contain sugar, moderation in carbohydrate intake, increasing intake of lean protein, reducing intake of saturated and trans fat and reducing intake of cholesterol  Exercise recommendations include exercising 3-5 times per week  No pharmacotherapy was ordered  Patient referred to PCP due to patient being overweight  Assessment/Plan:    1  Hypertension at goal  2  Diabetes- At goal  A1c much improved from 6 6 to 5 9  Continue Metformin and Trulicity  3  Microalbuminemia- Continue Lisinopril  4  Hypothyroidism- At goal  Continue Levothyroxine  5  Hyperlipidemia- At goal  Continue statin  6  Peripheral neuropathy- Start Gabapentin 100 mg at bedtime  You can increase to 300 mg as directed  7  UTI- Start Macrobid, drink plenty of clear fluids  Follow up with me as needed and with Dr Yaneth Garcia in six months, labs prior  Diagnoses and all orders for this visit:    Type 2 diabetes mellitus with microalbuminuria, without long-term current use of insulin (HCC)  -     ergocalciferol (VITAMIN D2) 50,000 units; Take 1 capsule (50,000 Units total) by mouth once a week  -     albuterol (PROVENTIL HFA,VENTOLIN HFA) 90 mcg/act inhaler; Inhale 2 puffs every 6 (six) hours as needed for wheezing  -     gabapentin (NEURONTIN) 100 mg capsule; Take one tab po QHS x 1 week, then increase to two tabs po QHS x 1 week, then increase to three tabs po QHS thereafter   -     CBC and differential; Future  -     Comprehensive metabolic panel; Future  -     HEMOGLOBIN A1C W/ EAG ESTIMATION; Future  -     Lipid panel; Future  -     UA w Reflex to Microscopic w Reflex to Culture -Lab Collect  -     Vitamin D 25 hydroxy;  Future  -     TSH, 3rd generation with Free T4 reflex; Future  -     Magnesium; Future  -     nitrofurantoin (MACROBID) 100 mg capsule; Take 1 capsule (100 mg total) by mouth 2 (two) times a day for 5 days    Urinary, incontinence, stress female  -     ergocalciferol (VITAMIN D2) 50,000 units; Take 1 capsule (50,000 Units total) by mouth once a week  -     albuterol (PROVENTIL HFA,VENTOLIN HFA) 90 mcg/act inhaler; Inhale 2 puffs every 6 (six) hours as needed for wheezing  -     gabapentin (NEURONTIN) 100 mg capsule; Take one tab po QHS x 1 week, then increase to two tabs po QHS x 1 week, then increase to three tabs po QHS thereafter   -     CBC and differential; Future  -     Comprehensive metabolic panel; Future  -     HEMOGLOBIN A1C W/ EAG ESTIMATION; Future  -     Lipid panel; Future  -     UA w Reflex to Microscopic w Reflex to Culture -Lab Collect  -     Vitamin D 25 hydroxy; Future  -     TSH, 3rd generation with Free T4 reflex; Future  -     Magnesium; Future  -     nitrofurantoin (MACROBID) 100 mg capsule; Take 1 capsule (100 mg total) by mouth 2 (two) times a day for 5 days    Mild intermittent asthma, unspecified whether complicated  -     ergocalciferol (VITAMIN D2) 50,000 units; Take 1 capsule (50,000 Units total) by mouth once a week  -     albuterol (PROVENTIL HFA,VENTOLIN HFA) 90 mcg/act inhaler; Inhale 2 puffs every 6 (six) hours as needed for wheezing  -     gabapentin (NEURONTIN) 100 mg capsule; Take one tab po QHS x 1 week, then increase to two tabs po QHS x 1 week, then increase to three tabs po QHS thereafter   -     CBC and differential; Future  -     Comprehensive metabolic panel; Future  -     HEMOGLOBIN A1C W/ EAG ESTIMATION; Future  -     Lipid panel; Future  -     UA w Reflex to Microscopic w Reflex to Culture -Lab Collect  -     Vitamin D 25 hydroxy; Future  -     TSH, 3rd generation with Free T4 reflex; Future  -     Magnesium; Future  -     nitrofurantoin (MACROBID) 100 mg capsule;  Take 1 capsule (100 mg total) by mouth 2 (two) times a day for 5 days    Benign essential hypertension  -     ergocalciferol (VITAMIN D2) 50,000 units; Take 1 capsule (50,000 Units total) by mouth once a week  -     albuterol (PROVENTIL HFA,VENTOLIN HFA) 90 mcg/act inhaler; Inhale 2 puffs every 6 (six) hours as needed for wheezing  -     gabapentin (NEURONTIN) 100 mg capsule; Take one tab po QHS x 1 week, then increase to two tabs po QHS x 1 week, then increase to three tabs po QHS thereafter   -     CBC and differential; Future  -     Comprehensive metabolic panel; Future  -     HEMOGLOBIN A1C W/ EAG ESTIMATION; Future  -     Lipid panel; Future  -     UA w Reflex to Microscopic w Reflex to Culture -Lab Collect  -     Vitamin D 25 hydroxy; Future  -     TSH, 3rd generation with Free T4 reflex; Future  -     Magnesium; Future  -     nitrofurantoin (MACROBID) 100 mg capsule; Take 1 capsule (100 mg total) by mouth 2 (two) times a day for 5 days    Acute cystitis with hematuria  -     ergocalciferol (VITAMIN D2) 50,000 units; Take 1 capsule (50,000 Units total) by mouth once a week  -     albuterol (PROVENTIL HFA,VENTOLIN HFA) 90 mcg/act inhaler; Inhale 2 puffs every 6 (six) hours as needed for wheezing  -     gabapentin (NEURONTIN) 100 mg capsule; Take one tab po QHS x 1 week, then increase to two tabs po QHS x 1 week, then increase to three tabs po QHS thereafter   -     CBC and differential; Future  -     Comprehensive metabolic panel; Future  -     HEMOGLOBIN A1C W/ EAG ESTIMATION; Future  -     Lipid panel; Future  -     UA w Reflex to Microscopic w Reflex to Culture -Lab Collect  -     Vitamin D 25 hydroxy; Future  -     TSH, 3rd generation with Free T4 reflex; Future  -     Magnesium; Future  -     nitrofurantoin (MACROBID) 100 mg capsule; Take 1 capsule (100 mg total) by mouth 2 (two) times a day for 5 days    Obesity with serious comorbidity, unspecified classification, unspecified obesity type  -     ergocalciferol (VITAMIN D2) 50,000 units;  Take 1 capsule (50,000 Units total) by mouth once a week  -     albuterol (PROVENTIL HFA,VENTOLIN HFA) 90 mcg/act inhaler; Inhale 2 puffs every 6 (six) hours as needed for wheezing  -     gabapentin (NEURONTIN) 100 mg capsule; Take one tab po QHS x 1 week, then increase to two tabs po QHS x 1 week, then increase to three tabs po QHS thereafter   -     CBC and differential; Future  -     Comprehensive metabolic panel; Future  -     HEMOGLOBIN A1C W/ EAG ESTIMATION; Future  -     Lipid panel; Future  -     UA w Reflex to Microscopic w Reflex to Culture -Lab Collect  -     Vitamin D 25 hydroxy; Future  -     TSH, 3rd generation with Free T4 reflex; Future  -     Magnesium; Future  -     nitrofurantoin (MACROBID) 100 mg capsule; Take 1 capsule (100 mg total) by mouth 2 (two) times a day for 5 days    Peripheral polyneuropathy  -     ergocalciferol (VITAMIN D2) 50,000 units; Take 1 capsule (50,000 Units total) by mouth once a week  -     albuterol (PROVENTIL HFA,VENTOLIN HFA) 90 mcg/act inhaler; Inhale 2 puffs every 6 (six) hours as needed for wheezing  -     gabapentin (NEURONTIN) 100 mg capsule; Take one tab po QHS x 1 week, then increase to two tabs po QHS x 1 week, then increase to three tabs po QHS thereafter   -     CBC and differential; Future  -     Comprehensive metabolic panel; Future  -     HEMOGLOBIN A1C W/ EAG ESTIMATION; Future  -     Lipid panel; Future  -     UA w Reflex to Microscopic w Reflex to Culture -Lab Collect  -     Vitamin D 25 hydroxy; Future  -     TSH, 3rd generation with Free T4 reflex; Future  -     Magnesium; Future  -     nitrofurantoin (MACROBID) 100 mg capsule; Take 1 capsule (100 mg total) by mouth 2 (two) times a day for 5 days        The patient was counseled regarding instructions for management, risk factor reductions, patient and family education,impressions, risks and benefits of treatment options, side effects of medications, importance of compliance with treatment   The treatment plan was reviewed with the patient/guardian and patient/guardian understands and agrees with the treatment plan              Current Outpatient Medications:     ACCU-CHEK FASTCLIX LANCETS MISC, TEST 2 TIMES A DAY, Disp: , Rfl:     albuterol (PROVENTIL HFA,VENTOLIN HFA) 90 mcg/act inhaler, Inhale 2 puffs every 6 (six) hours as needed for wheezing, Disp: 1 Inhaler, Rfl: 5    aspirin 81 MG tablet, Take 1 tablet (81 mg total) by mouth daily, Disp: 100 tablet, Rfl: 2    atorvastatin (LIPITOR) 20 mg tablet, Take 20 mg by mouth daily at bedtime, Disp: , Rfl: 2    ergocalciferol (VITAMIN D2) 50,000 units, Take 1 capsule (50,000 Units total) by mouth once a week, Disp: 12 capsule, Rfl: 2    glucagon (GLUCAGON EMERGENCY) 1 MG injection, Inject as directed, Disp: , Rfl:     glucose blood (ACCU-CHEK GLORIA) test strip, by In Vitro route 4 (four) times a day, Disp: , Rfl:     levothyroxine 75 mcg tablet, Take 75 mcg by mouth daily, Disp: , Rfl:     lisinopril (ZESTRIL) 2 5 mg tablet, Take 1 tablet (2 5 mg total) by mouth daily, Disp: 90 tablet, Rfl: 2    metFORMIN (GLUCOPHAGE-XR) 750 mg 24 hr tablet, TAKE 1 TABLET BY MOUTH 3 TIMES A DAY AFTER MEALS (Patient taking differently: Take by mouth 2 (two) times a day before breakfast and lunch ), Disp: 90 tablet, Rfl: 3    montelukast (SINGULAIR) 10 mg tablet, TAKE 1 TABLET BY MOUTH EVERYDAY AT BEDTIME, Disp: 15 tablet, Rfl: 5    naproxen (NAPROSYN) 500 mg tablet, Take 1 tablet (500 mg total) by mouth 2 (two) times a day with meals, Disp: 30 tablet, Rfl: 0    pantoprazole (PROTONIX) 40 mg tablet, TAKE 1 TABLET BY MOUTH EVERY DAY, Disp: 30 tablet, Rfl: 3    phentermine 37 5 MG capsule, Take 37 5 mg by mouth every morning, Disp: , Rfl:     topiramate (TOPAMAX) 25 mg tablet, Take 25 mg by mouth 2 (two) times a day, Disp: , Rfl:     TRULICITY 1 5 PF/7 5YU SOPN, INJECT SUBCUTANEOUSLY 1 5MG EVERY WEEK, Disp: , Rfl: 1    gabapentin (NEURONTIN) 100 mg capsule, Take one tab po QHS x 1 week, then increase to two tabs po QHS x 1 week, then increase to three tabs po QHS thereafter , Disp: 90 capsule, Rfl: 1    nitrofurantoin (MACROBID) 100 mg capsule, Take 1 capsule (100 mg total) by mouth 2 (two) times a day for 5 days, Disp: 10 capsule, Rfl: 0    Subjective:      Patient ID: Kamar Bateman is a 38 y o  female  Here with one week of burning with urination  Also c/o numbness in her B/L hands and "cramping" sensation in legs and feet to the point where her feet twist inward and she has to physically move her feet back in to position and she has difficulty walking  B/L hands and feet are usually cold, has been worse since winter began  She also notices that her toes cramp up and become numb when walking around  The following portions of the patient's history were reviewed and updated as appropriate:   She has a past medical history of Angina pectoris ([address] ), Anxiety, Atypical chest pain, Diabetes mellitus ([facility] [address] ), Disease of thyroid gland, Endometriosis, Hypercalcemia, Hyperlipidemia, Hyponatremia, Metrorrhagia, Microscopic hematuria, Obesity, PONV (postoperative nausea and vomiting), Proteinuria, Shortness of breath, and TIA (transient ischemic attack)  ,  does not have any pertinent problems on file  ,   has a past surgical history that includes Ovarian cyst removal (Right); Hysterectomy; pr cystourethroscopy,fulgur <0 5 cm sarika (N/A, 3/23/2017); Laparoscopic cholecystectomy; and Oophorectomy  ,  family history includes Alcohol abuse in her father; Anxiety disorder in her daughter and mother; Arthritis in her cousin, maternal aunt, mother, and paternal aunt; Asthma in her sister; Breast cancer in her maternal aunt, paternal aunt, and sister; Colon cancer in her maternal grandmother and paternal grandmother; Depression in her child, maternal aunt, mother, and son; Diabetes in her maternal aunt, maternal uncle, mother, paternal aunt, paternal uncle, and sister;  Heart attack in her mother; Heart disease in her mother; Hyperlipidemia in her father and mother; Hypertension in her cousin, father, maternal aunt, mother, and paternal aunt; Melanoma in her sister; Substance Abuse in her cousin  ,   reports that she has never smoked  She has never used smokeless tobacco  She reports that she does not drink alcohol or use drugs  ,  is allergic to cephalosporins; norflex [orphenadrine]; rocephin [ceftriaxone]; sulfa antibiotics; and ultracet [tramadol-acetaminophen]       Review of Systems   Constitutional: Negative  Respiratory: Negative  Cardiovascular: Negative  Musculoskeletal: Negative  Neurological: Positive for numbness  Psychiatric/Behavioral: Negative            Objective:  /70 (BP Location: Left arm, Patient Position: Sitting, Cuff Size: Standard)   Pulse 85   Temp (!) 97 3 °F (36 3 °C) (Tympanic)   Resp 16   Ht 5' 3" (1 6 m)   Wt 70 kg (154 lb 6 4 oz)   SpO2 99%   BMI 27 35 kg/m²     Lab Review  Orders Only on 01/09/2020   Component Date Value    NEGATIVE CONTROL 11/13/2019 Negative    Appointment on 01/09/2020   Component Date Value    WBC 01/09/2020 7 90     RBC 01/09/2020 4 82     Hemoglobin 01/09/2020 14 6     Hematocrit 01/09/2020 46 0     MCV 01/09/2020 95     MCH 01/09/2020 30 3     MCHC 01/09/2020 31 7     RDW 01/09/2020 13 7     MPV 01/09/2020 10 9     Platelets 41/55/3546 368     nRBC 01/09/2020 0     Neutrophils Relative 01/09/2020 67     Immat GRANS % 01/09/2020 0     Lymphocytes Relative 01/09/2020 24     Monocytes Relative 01/09/2020 7     Eosinophils Relative 01/09/2020 1     Basophils Relative 01/09/2020 1     Neutrophils Absolute 01/09/2020 5 23     Immature Grans Absolute 01/09/2020 0 03     Lymphocytes Absolute 01/09/2020 1 90     Monocytes Absolute 01/09/2020 0 57     Eosinophils Absolute 01/09/2020 0 11     Basophils Absolute 01/09/2020 0 06     Sodium 01/09/2020 136     Potassium 01/09/2020 3 8     Chloride 01/09/2020 105     CO2 01/09/2020 28     ANION GAP 01/09/2020 3*    BUN 01/09/2020 12     Creatinine 01/09/2020 0 69     Glucose, Fasting 01/09/2020 95     Calcium 01/09/2020 10 3*    Corrected Calcium 01/09/2020 10 4*    AST 01/09/2020 10     ALT 01/09/2020 44     Alkaline Phosphatase 01/09/2020 94     Total Protein 01/09/2020 7 8     Albumin 01/09/2020 3 9     Total Bilirubin 01/09/2020 0 51     eGFR 01/09/2020 107     Hemoglobin A1C 01/09/2020 5 9     EAG 01/09/2020 123     Cholesterol 01/09/2020 171     Triglycerides 01/09/2020 122     HDL, Direct 01/09/2020 47     LDL Calculated 01/09/2020 100     LDL Direct 01/09/2020 94     Magnesium 01/09/2020 2 0     Color, UA 01/09/2020 Yellow     Clarity, UA 01/09/2020 Cloudy     Specific Gravity, UA 01/09/2020 1 024     pH, UA 01/09/2020 6 5     Leukocytes, UA 01/09/2020 Negative     Nitrite, UA 01/09/2020 Negative     Protein, UA 01/09/2020 Negative     Glucose, UA 01/09/2020 Negative     Ketones, UA 01/09/2020 Negative     Urobilinogen, UA 01/09/2020 0 2     Bilirubin, UA 01/09/2020 Negative     Blood, UA 01/09/2020 Moderate*    Creatinine, Ur 01/09/2020 187 0     Microalbum  ,U,Random 01/09/2020 31 7*    Microalb Creat Ratio 01/09/2020 17     TSH 3RD GENERATON 01/09/2020 3 160     Alk Phos Isoenzymes 01/09/2020 88     Alk Phos Liver Fract 01/09/2020 61     Alk Phos Bone Fract 01/09/2020 28     Alk Phos Intestine Fract 01/09/2020 11     RBC, UA 01/09/2020 2-4*    WBC, UA 01/09/2020 None Seen     Epithelial Cells 01/09/2020 Occasional     Bacteria, UA 01/09/2020 Occasional     Hyaline Casts, UA 01/09/2020 0-3*    MUCUS THREADS 01/09/2020 Moderate*        Imaging: No results found  Physical Exam   Constitutional: She is oriented to person, place, and time  She appears well-developed and well-nourished  Cardiovascular: Normal rate, regular rhythm, normal heart sounds and intact distal pulses     Pulmonary/Chest: Effort normal and breath sounds normal    Musculoskeletal: Normal range of motion  Neurological: She is alert and oriented to person, place, and time  She has normal reflexes  Psychiatric: She has a normal mood and affect   Her behavior is normal  Judgment and thought content normal

## 2020-01-13 NOTE — PATIENT INSTRUCTIONS
1  Hypertension at goal  2  Diabetes- At goal  A1c much improved from 6 6 to 5 9  Continue Metformin and Trulicity  3  Microalbuminemia- Continue Lisinopril  4  Hypothyroidism- At goal  Continue Levothyroxine  5  Hyperlipidemia- At goal  Continue statin  6  Peripheral neuropathy- Start Gabapentin 100 mg at bedtime  You can increase to 300 mg as directed  7  UTI- Start Macrobid, drink plenty of clear fluids  Follow up with me as needed and with Dr Justice Figueroa in six months, labs prior

## 2020-01-13 NOTE — PROGRESS NOTES
Assessment/Plan:  Assessment/Plan   Diagnoses and all orders for this visit:    Subacromial impingement of left shoulder    Rotator cuff strain, left, subsequent encounter    Biceps strain, left, subsequent encounter    Trapezius muscle spasm         43-year-old right-hand-dominant female with left shoulder pain of more than 2 months duration  Discussed with patient physical exam, impression and plan  Left shoulder range of motion limited to forward flexion of 130°, abduction 110°, and internal rotation to the lumbar spine  Clinical impression that she is still symptomatic from strains and subacromial impingement  She is advised to proceed with physical therapy and continue with taking anti-inflammatories as needed  She may also benefit from modifications of her work station and I will provide her with a note for this  She will follow up in 6 weeks at which point she will be re-evaluated  Subjective:   Patient ID: Kit Pelayo is a 38 y o  female  Chief Complaint   Patient presents with    Left Shoulder - Follow-up       43-year-old right-hand-dominant female following for left shoulder pain more than 2 months duration  She was last seen nearly 6 weeks ago at which point she was assessed to have strains the biceps and rotator cuff, and subacromial impingement  She was given left shoulder subacromial corticosteroid injection, prescribed naproxen 500 mg twice daily and referred to formal physical therapy  She reports injection provided improvement in her pain  She still experiences pain described as generalized to the shoulder worse at the anterior lateral aspect, achy and sometimes sharp, radiating to the left side of the neck, worse with elevating the arm and twisting, 7-8 out of 10 at worst, and improved with return to neutral position    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 directly in front of her with the computer screen off to the right, and maintaining this position for prolonged duration also worsens her pain  She has been taking the naproxen 2 to 3 times a week  She is scheduled to start physical therapy tomorrow  Shoulder Pain   This is a new problem  The current episode started more than 1 month ago  The problem occurs intermittently  The problem has been gradually improving  Associated symptoms include arthralgias  Pertinent negatives include no joint swelling, numbness or weakness  Exacerbated by: Arm use  She has tried rest and NSAIDs (Corticosteroid injection) for the symptoms  The treatment provided mild relief  Review of Systems   Musculoskeletal: Positive for arthralgias  Negative for joint swelling  Neurological: Negative for weakness and numbness  Objective:  Vitals:    01/13/20 0902   BP: 114/79   Pulse: 76   Weight: 69 9 kg (154 lb)   Height: 5' 3" (1 6 m)     Left Shoulder Exam     Range of Motion   Active abduction: 110   Forward flexion: 130   Internal rotation 0 degrees: Lumbar             Physical Exam   Constitutional: She is oriented to person, place, and time  She appears well-developed  No distress  HENT:   Head: Normocephalic  Eyes: Conjunctivae are normal    Neck: No tracheal deviation present  Cardiovascular: Normal rate  Pulmonary/Chest: Effort normal  No respiratory distress  Abdominal: She exhibits no distension  Neurological: She is alert and oriented to person, place, and time  Skin: Skin is warm and dry  Psychiatric: She has a normal mood and affect  Her behavior is normal    Nursing note and vitals reviewed

## 2020-01-30 DIAGNOSIS — E66.3 OVERWEIGHT: ICD-10-CM

## 2020-01-30 RX ORDER — PANTOPRAZOLE SODIUM 40 MG/1
TABLET, DELAYED RELEASE ORAL
Qty: 30 TABLET | Refills: 0 | Status: SHIPPED | OUTPATIENT
Start: 2020-01-30 | End: 2021-02-23 | Stop reason: SDUPTHER

## 2020-02-24 ENCOUNTER — OFFICE VISIT (OUTPATIENT)
Dept: OBGYN CLINIC | Facility: CLINIC | Age: 45
End: 2020-02-24
Payer: COMMERCIAL

## 2020-02-24 VITALS
DIASTOLIC BLOOD PRESSURE: 83 MMHG | SYSTOLIC BLOOD PRESSURE: 117 MMHG | HEIGHT: 63 IN | HEART RATE: 111 BPM | WEIGHT: 152 LBS | BODY MASS INDEX: 26.93 KG/M2

## 2020-02-24 DIAGNOSIS — M62.838 TRAPEZIUS MUSCLE SPASM: ICD-10-CM

## 2020-02-24 DIAGNOSIS — S46.212D BICEPS STRAIN, LEFT, SUBSEQUENT ENCOUNTER: ICD-10-CM

## 2020-02-24 DIAGNOSIS — S16.1XXD CERVICAL STRAIN, SUBSEQUENT ENCOUNTER: ICD-10-CM

## 2020-02-24 DIAGNOSIS — S46.012D ROTATOR CUFF STRAIN, LEFT, SUBSEQUENT ENCOUNTER: ICD-10-CM

## 2020-02-24 DIAGNOSIS — M75.42 SUBACROMIAL IMPINGEMENT OF LEFT SHOULDER: Primary | ICD-10-CM

## 2020-02-24 PROCEDURE — 3079F DIAST BP 80-89 MM HG: CPT | Performed by: FAMILY MEDICINE

## 2020-02-24 PROCEDURE — 3074F SYST BP LT 130 MM HG: CPT | Performed by: FAMILY MEDICINE

## 2020-02-24 PROCEDURE — 3044F HG A1C LEVEL LT 7.0%: CPT | Performed by: FAMILY MEDICINE

## 2020-02-24 PROCEDURE — 3066F NEPHROPATHY DOC TX: CPT | Performed by: FAMILY MEDICINE

## 2020-02-24 PROCEDURE — 1036F TOBACCO NON-USER: CPT | Performed by: FAMILY MEDICINE

## 2020-02-24 PROCEDURE — 3060F POS MICROALBUMINURIA REV: CPT | Performed by: FAMILY MEDICINE

## 2020-02-24 PROCEDURE — 3008F BODY MASS INDEX DOCD: CPT | Performed by: FAMILY MEDICINE

## 2020-02-24 PROCEDURE — 99213 OFFICE O/P EST LOW 20 MIN: CPT | Performed by: FAMILY MEDICINE

## 2020-02-24 NOTE — PROGRESS NOTES
Assessment/Plan:  Assessment/Plan   Diagnoses and all orders for this visit:    Subacromial impingement of left shoulder  -     Ambulatory referral to Physical Therapy; Future    Trapezius muscle spasm  -     Ambulatory referral to Physical Therapy; Future    Rotator cuff strain, left, subsequent encounter  -     Ambulatory referral to Physical Therapy; Future    Cervical strain, subsequent encounter  -     Ambulatory referral to Physical Therapy; Future    Biceps strain, left, subsequent encounter  -     Ambulatory referral to Physical Therapy; Future        41-year-old right-hand-dominant female with left shoulder pain of more than 3 months duration  Discussed with patient physical exam, impression and plan  Physical exam noted for tenderness of the trapezius and left and at the medial periscapular border, as well as lateral subacromial aspect the shoulder  She has range of motion forward flexion to 150°, abduction 130°, and internal rotation to lumbar spine  She has normal strength of the shoulder  There is positive Garcia maneuver  Cervical spine is noted for limited range of motion right and left side bending  There is positive axial load and negative Spurling's  Clinical impression that she is improved regard to her shoulder  Recommend she continue with physical therapy home exercises to also focus on her neck, as cervical spine may also be source of her pain  She will follow up in 8 weeks at which point she will be re-evaluated    Subjective:   Patient ID: Thiago Cohen is a 38 y o  female  Chief Complaint   Patient presents with    Left Shoulder - Follow-up       41-year-old right-hand-dominant female following up for left shoulder pain of more than 3 months duration  She was last seen 5 weeks ago at which point she was advised to proceed formal physical therapy, and to continue with taking prescribed NSAIDs  She received corticosteroid injection to left shoulder on 12/02/2019    She has been doing physical therapy sessions 2 times a week and doing home exercises as directed  She reports moderate improvement since her last visit  Pain is decreased and range of motion has improved  Pain described as localized to the superior posterior aspect of the shoulder, intermittent, achy and sometimes sharp, radiating to the lateral aspect of the upper arm, worse with elevation of the arm and reaching behind her back, associated limited motion, associated numbness and tingling, worse with physical activity, and improved with rest   She has not need to take any medication for pain since her last visit  Shoulder Pain   This is a new problem  The current episode started more than 1 month ago  The problem occurs intermittently  The problem has been gradually improving  Associated symptoms include arthralgias and numbness  Pertinent negatives include no joint swelling or weakness  The symptoms are aggravated by twisting (Arm elevation)  She has tried rest and NSAIDs (Physical therapy, home exercise) for the symptoms  The treatment provided moderate relief  Review of Systems   Musculoskeletal: Positive for arthralgias  Negative for joint swelling  Neurological: Positive for numbness  Negative for weakness         Objective:  Vitals:    02/24/20 0802   BP: 117/83   Pulse: (!) 111   Weight: 68 9 kg (152 lb)   Height: 5' 3" (1 6 m)     Back Exam     Reflexes   Biceps: normal    Comments:    Cervical spine  -bilateral paraspinal hypertonicity  -limited range of motion with right and left side bending  -positive axial load  -negative Spurling's  -normal strength both upper extremities      Right Elbow Exam     Comments:  5/5 strength flexion and extension at      Left Elbow Exam     Comments:  5/5 strength flexion and extension      Right Shoulder Exam     Muscle Strength   Abduction: 5/5   Internal rotation: 5/5   External rotation: 5/5   Supraspinatus: 5/5       Left Shoulder Exam     Tenderness Left shoulder tenderness location: Trapezius, lateral subacromial, medial periscapular border  Range of Motion   Active abduction: 130   Forward flexion: 150   Internal rotation 0 degrees: Lumbar     Muscle Strength   Abduction: 5/5   Internal rotation: 5/5   External rotation: 5/5   Supraspinatus: 5/5     Tests   Garcia test: positive            Physical Exam   Constitutional: She is oriented to person, place, and time  She appears well-developed  No distress  HENT:   Head: Normocephalic  Eyes: Conjunctivae are normal    Neck: No tracheal deviation present  Cardiovascular:   Tachycardic   Pulmonary/Chest: Effort normal  No respiratory distress  Abdominal: She exhibits no distension  Neurological: She is alert and oriented to person, place, and time  Skin: Skin is warm and dry  Psychiatric: She has a normal mood and affect  Her behavior is normal    Nursing note and vitals reviewed

## 2020-02-25 ENCOUNTER — TELEPHONE (OUTPATIENT)
Dept: OBGYN CLINIC | Facility: HOSPITAL | Age: 45
End: 2020-02-25

## 2020-02-25 NOTE — TELEPHONE ENCOUNTER
[facility] [phone] option [phone]    Remington Patel is calling because she faxed a plan of care to us on 1/15/20 & 2/24/20 to [phone]  I did give her the fax number [phone], she will refax so please sign & return asap

## 2020-03-13 DIAGNOSIS — N39.3 URINARY, INCONTINENCE, STRESS FEMALE: ICD-10-CM

## 2020-03-13 DIAGNOSIS — N30.01 ACUTE CYSTITIS WITH HEMATURIA: ICD-10-CM

## 2020-03-13 DIAGNOSIS — E66.9 OBESITY WITH SERIOUS COMORBIDITY, UNSPECIFIED CLASSIFICATION, UNSPECIFIED OBESITY TYPE: ICD-10-CM

## 2020-03-13 DIAGNOSIS — E11.29 TYPE 2 DIABETES MELLITUS WITH MICROALBUMINURIA, WITHOUT LONG-TERM CURRENT USE OF INSULIN (HCC): ICD-10-CM

## 2020-03-13 DIAGNOSIS — I10 BENIGN ESSENTIAL HYPERTENSION: ICD-10-CM

## 2020-03-13 DIAGNOSIS — J45.20 MILD INTERMITTENT ASTHMA, UNSPECIFIED WHETHER COMPLICATED: ICD-10-CM

## 2020-03-13 DIAGNOSIS — G62.9 PERIPHERAL POLYNEUROPATHY: ICD-10-CM

## 2020-03-13 DIAGNOSIS — R80.9 TYPE 2 DIABETES MELLITUS WITH MICROALBUMINURIA, WITHOUT LONG-TERM CURRENT USE OF INSULIN (HCC): ICD-10-CM

## 2020-03-13 RX ORDER — GABAPENTIN 100 MG/1
CAPSULE ORAL
Qty: 90 CAPSULE | Refills: 1 | Status: SHIPPED | OUTPATIENT
Start: 2020-03-13 | End: 2020-05-08

## 2020-04-21 DIAGNOSIS — J45.909 UNCOMPLICATED ASTHMA, UNSPECIFIED ASTHMA SEVERITY, UNSPECIFIED WHETHER PERSISTENT: ICD-10-CM

## 2020-04-24 RX ORDER — MONTELUKAST SODIUM 10 MG/1
TABLET ORAL
Qty: 15 TABLET | Refills: 5 | Status: SHIPPED | OUTPATIENT
Start: 2020-04-24 | End: 2020-08-10

## 2020-05-06 DIAGNOSIS — J45.20 MILD INTERMITTENT ASTHMA, UNSPECIFIED WHETHER COMPLICATED: ICD-10-CM

## 2020-05-06 DIAGNOSIS — E66.9 OBESITY WITH SERIOUS COMORBIDITY, UNSPECIFIED CLASSIFICATION, UNSPECIFIED OBESITY TYPE: ICD-10-CM

## 2020-05-06 DIAGNOSIS — E11.29 TYPE 2 DIABETES MELLITUS WITH MICROALBUMINURIA, WITHOUT LONG-TERM CURRENT USE OF INSULIN (HCC): ICD-10-CM

## 2020-05-06 DIAGNOSIS — I10 BENIGN ESSENTIAL HYPERTENSION: ICD-10-CM

## 2020-05-06 DIAGNOSIS — R80.9 TYPE 2 DIABETES MELLITUS WITH MICROALBUMINURIA, WITHOUT LONG-TERM CURRENT USE OF INSULIN (HCC): ICD-10-CM

## 2020-05-06 DIAGNOSIS — G62.9 PERIPHERAL POLYNEUROPATHY: ICD-10-CM

## 2020-05-06 DIAGNOSIS — N39.3 URINARY, INCONTINENCE, STRESS FEMALE: ICD-10-CM

## 2020-05-06 DIAGNOSIS — N30.01 ACUTE CYSTITIS WITH HEMATURIA: ICD-10-CM

## 2020-05-08 RX ORDER — GABAPENTIN 100 MG/1
CAPSULE ORAL
Qty: 90 CAPSULE | Refills: 1 | Status: SHIPPED | OUTPATIENT
Start: 2020-05-08 | End: 2020-07-06

## 2020-06-19 RX ORDER — NAPROXEN 500 MG/1
TABLET ORAL
Qty: 30 TABLET | Refills: 0 | OUTPATIENT
Start: 2020-06-19

## 2020-07-02 DIAGNOSIS — N39.3 URINARY, INCONTINENCE, STRESS FEMALE: ICD-10-CM

## 2020-07-02 DIAGNOSIS — R80.9 TYPE 2 DIABETES MELLITUS WITH MICROALBUMINURIA, WITHOUT LONG-TERM CURRENT USE OF INSULIN (HCC): ICD-10-CM

## 2020-07-02 DIAGNOSIS — E11.29 TYPE 2 DIABETES MELLITUS WITH MICROALBUMINURIA, WITHOUT LONG-TERM CURRENT USE OF INSULIN (HCC): ICD-10-CM

## 2020-07-02 DIAGNOSIS — I10 BENIGN ESSENTIAL HYPERTENSION: ICD-10-CM

## 2020-07-02 DIAGNOSIS — N30.01 ACUTE CYSTITIS WITH HEMATURIA: ICD-10-CM

## 2020-07-02 DIAGNOSIS — G62.9 PERIPHERAL POLYNEUROPATHY: ICD-10-CM

## 2020-07-02 DIAGNOSIS — E66.9 OBESITY WITH SERIOUS COMORBIDITY, UNSPECIFIED CLASSIFICATION, UNSPECIFIED OBESITY TYPE: ICD-10-CM

## 2020-07-02 DIAGNOSIS — J45.20 MILD INTERMITTENT ASTHMA, UNSPECIFIED WHETHER COMPLICATED: ICD-10-CM

## 2020-07-06 RX ORDER — GABAPENTIN 100 MG/1
CAPSULE ORAL
Qty: 90 CAPSULE | Refills: 1 | Status: SHIPPED | OUTPATIENT
Start: 2020-07-06 | End: 2020-11-11

## 2020-07-20 ENCOUNTER — OFFICE VISIT (OUTPATIENT)
Dept: INTERNAL MEDICINE CLINIC | Facility: CLINIC | Age: 45
End: 2020-07-20
Payer: COMMERCIAL

## 2020-07-20 VITALS
BODY MASS INDEX: 28 KG/M2 | DIASTOLIC BLOOD PRESSURE: 70 MMHG | RESPIRATION RATE: 16 BRPM | HEIGHT: 63 IN | OXYGEN SATURATION: 97 % | WEIGHT: 158 LBS | TEMPERATURE: 98.2 F | HEART RATE: 92 BPM | SYSTOLIC BLOOD PRESSURE: 118 MMHG

## 2020-07-20 DIAGNOSIS — R80.1 PERSISTENT PROTEINURIA: ICD-10-CM

## 2020-07-20 DIAGNOSIS — E11.21 TYPE 2 DIABETES MELLITUS WITH DIABETIC NEPHROPATHY, WITHOUT LONG-TERM CURRENT USE OF INSULIN (HCC): ICD-10-CM

## 2020-07-20 DIAGNOSIS — I10 ESSENTIAL HYPERTENSION: ICD-10-CM

## 2020-07-20 DIAGNOSIS — R22.1 LUMP IN NECK: ICD-10-CM

## 2020-07-20 DIAGNOSIS — J45.20 MILD INTERMITTENT ASTHMA, UNSPECIFIED WHETHER COMPLICATED: ICD-10-CM

## 2020-07-20 DIAGNOSIS — G43.009 MIGRAINE WITHOUT AURA AND WITHOUT STATUS MIGRAINOSUS, NOT INTRACTABLE: Primary | ICD-10-CM

## 2020-07-20 DIAGNOSIS — E78.5 HYPERLIPIDEMIA, UNSPECIFIED HYPERLIPIDEMIA TYPE: ICD-10-CM

## 2020-07-20 PROBLEM — R31.0 GROSS HEMATURIA: Status: RESOLVED | Noted: 2017-12-05 | Resolved: 2020-07-20

## 2020-07-20 PROBLEM — M25.512 ACUTE PAIN OF LEFT SHOULDER: Status: RESOLVED | Noted: 2019-11-20 | Resolved: 2020-07-20

## 2020-07-20 PROBLEM — R31.29 MICROSCOPIC HEMATURIA: Status: RESOLVED | Noted: 2019-11-20 | Resolved: 2020-07-20

## 2020-07-20 PROBLEM — N30.01 ACUTE CYSTITIS WITH HEMATURIA: Status: RESOLVED | Noted: 2019-06-26 | Resolved: 2020-07-20

## 2020-07-20 PROCEDURE — 3074F SYST BP LT 130 MM HG: CPT | Performed by: NURSE PRACTITIONER

## 2020-07-20 PROCEDURE — 3044F HG A1C LEVEL LT 7.0%: CPT | Performed by: NURSE PRACTITIONER

## 2020-07-20 PROCEDURE — 3008F BODY MASS INDEX DOCD: CPT | Performed by: NURSE PRACTITIONER

## 2020-07-20 PROCEDURE — 99214 OFFICE O/P EST MOD 30 MIN: CPT | Performed by: NURSE PRACTITIONER

## 2020-07-20 PROCEDURE — 1036F TOBACCO NON-USER: CPT | Performed by: NURSE PRACTITIONER

## 2020-07-20 PROCEDURE — 4010F ACE/ARB THERAPY RXD/TAKEN: CPT | Performed by: NURSE PRACTITIONER

## 2020-07-20 PROCEDURE — 3078F DIAST BP <80 MM HG: CPT | Performed by: NURSE PRACTITIONER

## 2020-07-20 PROCEDURE — 3060F POS MICROALBUMINURIA REV: CPT | Performed by: NURSE PRACTITIONER

## 2020-07-20 PROCEDURE — 3066F NEPHROPATHY DOC TX: CPT | Performed by: NURSE PRACTITIONER

## 2020-07-20 RX ORDER — SUMATRIPTAN 25 MG/1
25 TABLET, FILM COATED ORAL ONCE AS NEEDED
Qty: 30 TABLET | Refills: 0 | Status: SHIPPED | OUTPATIENT
Start: 2020-07-20 | End: 2021-02-23 | Stop reason: SDUPTHER

## 2020-07-20 RX ORDER — TOPIRAMATE 50 MG/1
50 TABLET, FILM COATED ORAL 2 TIMES DAILY
Qty: 180 TABLET | Refills: 1 | Status: SHIPPED | OUTPATIENT
Start: 2020-07-20 | End: 2021-02-23 | Stop reason: SDUPTHER

## 2020-07-20 RX ORDER — LISINOPRIL 2.5 MG/1
2.5 TABLET ORAL DAILY
Qty: 90 TABLET | Refills: 2 | Status: SHIPPED | OUTPATIENT
Start: 2020-07-20 | End: 2021-02-23 | Stop reason: SDUPTHER

## 2020-07-20 NOTE — PROGRESS NOTES
Assessment/Plan:    1  Diabetes- A1c 6 0  Followed by endocrinology  2  Proteinuria- Continue Lisinopril  3  Asthma stable on Singulair  4  Migraines- Increase Topiramate to 50 mg twice daily as we discussed to decrease the frequency of your headaches  You can also take generic Imitrex as needed at the onset of the migraine and Tylenol for rescue as we discussed  5  Hyperlipidemia- due for lipid panel, continue Atorvastatin  Please have your blood work completed and we will call you with results  Diagnoses and all orders for this visit:    Migraine without aura and without status migrainosus, not intractable  -     topiramate (TOPAMAX) 50 MG tablet; Take 1 tablet (50 mg total) by mouth 2 (two) times a day  -     lisinopril (ZESTRIL) 2 5 mg tablet; Take 1 tablet (2 5 mg total) by mouth daily  -     SUMAtriptan (IMITREX) 25 mg tablet; Take 1 tablet (25 mg total) by mouth once as needed for migraine for up to 30 doses    Type 2 diabetes mellitus with diabetic nephropathy, without long-term current use of insulin (HCC)  -     topiramate (TOPAMAX) 50 MG tablet; Take 1 tablet (50 mg total) by mouth 2 (two) times a day  -     lisinopril (ZESTRIL) 2 5 mg tablet; Take 1 tablet (2 5 mg total) by mouth daily  -     SUMAtriptan (IMITREX) 25 mg tablet; Take 1 tablet (25 mg total) by mouth once as needed for migraine for up to 30 doses    Essential hypertension  -     topiramate (TOPAMAX) 50 MG tablet; Take 1 tablet (50 mg total) by mouth 2 (two) times a day  -     lisinopril (ZESTRIL) 2 5 mg tablet; Take 1 tablet (2 5 mg total) by mouth daily  -     SUMAtriptan (IMITREX) 25 mg tablet; Take 1 tablet (25 mg total) by mouth once as needed for migraine for up to 30 doses    Persistent proteinuria  -     topiramate (TOPAMAX) 50 MG tablet; Take 1 tablet (50 mg total) by mouth 2 (two) times a day  -     lisinopril (ZESTRIL) 2 5 mg tablet;  Take 1 tablet (2 5 mg total) by mouth daily  -     SUMAtriptan (IMITREX) 25 mg tablet; Take 1 tablet (25 mg total) by mouth once as needed for migraine for up to 30 doses    Mild intermittent asthma, unspecified whether complicated  -     topiramate (TOPAMAX) 50 MG tablet; Take 1 tablet (50 mg total) by mouth 2 (two) times a day  -     lisinopril (ZESTRIL) 2 5 mg tablet; Take 1 tablet (2 5 mg total) by mouth daily  -     SUMAtriptan (IMITREX) 25 mg tablet; Take 1 tablet (25 mg total) by mouth once as needed for migraine for up to 30 doses    Hyperlipidemia, unspecified hyperlipidemia type  -     topiramate (TOPAMAX) 50 MG tablet; Take 1 tablet (50 mg total) by mouth 2 (two) times a day  -     lisinopril (ZESTRIL) 2 5 mg tablet; Take 1 tablet (2 5 mg total) by mouth daily  -     SUMAtriptan (IMITREX) 25 mg tablet; Take 1 tablet (25 mg total) by mouth once as needed for migraine for up to 30 doses    Lump in neck  -     US head neck soft tissue; Future    The patient was counseled regarding instructions for management, risk factor reductions, patient and family education,impressions, risks and benefits of treatment options, side effects of medications, importance of compliance with treatment  The treatment plan was reviewed with the patient/guardian and patient/guardian understands and agrees with the treatment plan          Current Outpatient Medications:     ACCU-CHEK FASTCLIX LANCETS MISC, TEST 2 TIMES A DAY, Disp: , Rfl:     albuterol (PROVENTIL HFA,VENTOLIN HFA) 90 mcg/act inhaler, Inhale 2 puffs every 6 (six) hours as needed for wheezing, Disp: 1 Inhaler, Rfl: 5    aspirin 81 MG tablet, Take 1 tablet (81 mg total) by mouth daily, Disp: 100 tablet, Rfl: 2    atorvastatin (LIPITOR) 20 mg tablet, Take 20 mg by mouth daily at bedtime, Disp: , Rfl: 2    ergocalciferol (VITAMIN D2) 50,000 units, Take 1 capsule (50,000 Units total) by mouth once a week, Disp: 12 capsule, Rfl: 2    gabapentin (NEURONTIN) 100 mg capsule, TAKE 1 CAPSULE AT BEDTIME X 1 WK,THEN 2 CAPSULES AT BEDTIME X 1 WK, THEN 3 CAPSULES AT BEDTIME, Disp: 90 capsule, Rfl: 1    glucagon (GLUCAGON EMERGENCY) 1 MG injection, Inject as directed, Disp: , Rfl:     glucose blood (ACCU-CHEK GLORIA) test strip, by In Vitro route 4 (four) times a day, Disp: , Rfl:     levothyroxine 75 mcg tablet, Take 75 mcg by mouth daily, Disp: , Rfl:     metFORMIN (GLUCOPHAGE-XR) 750 mg 24 hr tablet, TAKE 1 TABLET BY MOUTH 3 TIMES A DAY AFTER MEALS (Patient taking differently: Take by mouth 2 (two) times a day before breakfast and lunch ), Disp: 90 tablet, Rfl: 3    montelukast (SINGULAIR) 10 mg tablet, TAKE 1 TABLET BY MOUTH EVERYDAY AT BEDTIME, Disp: 15 tablet, Rfl: 5    naproxen (NAPROSYN) 500 mg tablet, Take 1 tablet (500 mg total) by mouth 2 (two) times a day with meals, Disp: 30 tablet, Rfl: 0    pantoprazole (PROTONIX) 40 mg tablet, TAKE 1 TABLET BY MOUTH EVERY DAY, Disp: 30 tablet, Rfl: 0    phentermine 37 5 MG capsule, Take 37 5 mg by mouth every morning, Disp: , Rfl:     TRULICITY 1 5 MN/6 6AR SOPN, INJECT SUBCUTANEOUSLY 1 5MG EVERY WEEK, Disp: , Rfl: 1    lisinopril (ZESTRIL) 2 5 mg tablet, Take 1 tablet (2 5 mg total) by mouth daily, Disp: 90 tablet, Rfl: 2    SUMAtriptan (IMITREX) 25 mg tablet, Take 1 tablet (25 mg total) by mouth once as needed for migraine for up to 30 doses, Disp: 30 tablet, Rfl: 0    topiramate (TOPAMAX) 50 MG tablet, Take 1 tablet (50 mg total) by mouth 2 (two) times a day, Disp: 180 tablet, Rfl: 1    Subjective:      Patient ID: Stew Sellers is a 38 y o  female  Here for follow up  Doing well  Has lost almost 40 lbs and keeping it off  Last A1c 6 0 and followed by endocrinology  Having migraines more often, four times per month, occasionally lasting a few days at a time, with vomiting and photophobia  Pain is 10/10 and occurs once per week  Asthma stable on Singulair        The following portions of the patient's history were reviewed and updated as appropriate:   She has a past medical history of Angina pectoris ([facility] [address] ), Anxiety, Atypical chest pain, Diabetes mellitus ([facility] [address] ), Disease of thyroid gland, Endometriosis, Hypercalcemia, Hyperlipidemia, Hyponatremia, Metrorrhagia, Microscopic hematuria, Obesity, PONV (postoperative nausea and vomiting), Proteinuria, Shortness of breath, and TIA (transient ischemic attack)  ,  does not have any pertinent problems on file  ,   has a past surgical history that includes Ovarian cyst removal (Right); Hysterectomy; pr cystourethroscopy,fulgur <0 5 cm sarika (N/A, 3/23/2017); Laparoscopic cholecystectomy; and Oophorectomy  ,  family history includes Alcohol abuse in her father; Anxiety disorder in her daughter and mother; Arthritis in her cousin, maternal aunt, mother, and paternal aunt; Asthma in her sister; Breast cancer in her maternal aunt, paternal aunt, and sister; Colon cancer in her maternal grandmother and paternal grandmother; Depression in her child, maternal aunt, mother, and son; Diabetes in her maternal aunt, maternal uncle, mother, paternal aunt, paternal uncle, and sister; Heart attack in her mother; Heart disease in her mother; Hyperlipidemia in her father and mother; Hypertension in her cousin, father, maternal aunt, mother, and paternal aunt; Melanoma in her sister; Substance Abuse in her cousin  ,   reports that she has never smoked  She has never used smokeless tobacco  She reports that she does not drink alcohol or use drugs  ,  is allergic to cephalosporins; norflex [orphenadrine]; rocephin [ceftriaxone]; sulfa antibiotics; and ultracet [tramadol-acetaminophen]       Review of Systems   Constitutional: Negative  Respiratory: Negative  Cardiovascular: Negative  Musculoskeletal: Negative  Neurological: Positive for headaches  Psychiatric/Behavioral: Negative            Objective:  /70 (BP Location: Left arm, Patient Position: Sitting, Cuff Size: Standard)   Pulse 92   Temp 98 2 °F (36 8 °C) (Tympanic)   Resp 16   Ht 5' 3" (1 6 m)   Wt 71 7 kg (158 lb)   SpO2 97%   BMI 27 99 kg/m²     Lab Review  No visits with results within 2 Month(s) from this visit  Latest known visit with results is:   Orders Only on 01/09/2020   Component Date Value    NEGATIVE CONTROL 11/13/2019 Negative         Imaging: No results found  Physical Exam   Constitutional: She is oriented to person, place, and time  She appears well-developed and well-nourished  Cardiovascular: Normal rate, regular rhythm, normal heart sounds and intact distal pulses  Pulses are no weak pulses  Pulses:       Dorsalis pedis pulses are 2+ on the right side, and 2+ on the left side  Posterior tibial pulses are 2+ on the right side, and 2+ on the left side  Pulmonary/Chest: Effort normal and breath sounds normal    Musculoskeletal: Normal range of motion  Feet:   Right Foot:   Skin Integrity: Negative for ulcer, skin breakdown, erythema, warmth, callus or dry skin  Left Foot:   Skin Integrity: Negative for ulcer, skin breakdown, erythema, warmth, callus or dry skin  Neurological: She is alert and oriented to person, place, and time  She has normal reflexes  Psychiatric: She has a normal mood and affect  Her behavior is normal  Judgment and thought content normal        Patient's shoes and socks removed  Right Foot/Ankle   Right Foot Inspection  Skin Exam: skin normal and skin intact no dry skin, no warmth, no callus, no erythema, no maceration, no abnormal color, no pre-ulcer, no ulcer and no callus                          Toe Exam: ROM and strength within normal limits  Sensory   Vibration: intact    Monofilament testing: intact  Vascular  Capillary refills: < 3 seconds  The right DP pulse is 2+  The right PT pulse is 2+       Left Foot/Ankle  Left Foot Inspection  Skin Exam: skin normal and skin intactno dry skin, no warmth, no erythema, no maceration, normal color, no pre-ulcer, no ulcer and no callus                         Toe Exam: ROM and strength within normal limits Sensory   Vibration: intact    Monofilament: intact  Vascular  Capillary refills: < 3 seconds  The left DP pulse is 2+  The left PT pulse is 2+  Assign Risk Category:  No deformity present; No loss of protective sensation;  No weak pulses       Risk: 0

## 2020-07-20 NOTE — PATIENT INSTRUCTIONS
1  Diabetes- A1c 6 0  Followed by endocrinology  2  Proteinuria- Continue Lisinopril  3  Asthma stable on Singulair  4  Migraines- Increase Topiramate to 50 mg twice daily as we discussed to decrease the frequency of your headaches  You can also take generic Imitrex as needed at the onset of the migraine and Tylenol for rescue as we discussed  5  Hyperlipidemia- due for lipid panel, continue Atorvastatin  Please have your blood work completed and we will call you with results

## 2020-07-30 ENCOUNTER — HOSPITAL ENCOUNTER (OUTPATIENT)
Dept: ULTRASOUND IMAGING | Facility: HOSPITAL | Age: 45
Discharge: HOME | End: 2020-07-30
Payer: COMMERCIAL

## 2020-07-30 DIAGNOSIS — R22.1 LUMP IN NECK: ICD-10-CM

## 2020-07-30 PROCEDURE — 76536 US EXAM OF HEAD AND NECK: CPT

## 2020-08-07 ENCOUNTER — TELEPHONE (OUTPATIENT)
Dept: INTERNAL MEDICINE CLINIC | Facility: CLINIC | Age: 45
End: 2020-08-07

## 2020-08-07 DIAGNOSIS — R22.1 LUMP IN NECK: Primary | ICD-10-CM

## 2020-08-07 NOTE — TELEPHONE ENCOUNTER
Patient states lump increased in size and hurts with pressure is put on it       ----- Message from Samy Santoro sent at 8/7/2020  1:56 PM EDT -----  Please call  Can you ask her if the skin lump on her neck is painful or if it has grown?  Thanks

## 2020-08-07 NOTE — TELEPHONE ENCOUNTER
LEFT CLEAR MESSAGE ON VOICEMAIL to call back  OFFICE # PROVIDED -        ---- Message from [place], [address] sent at 8/7/2020  1:56 PM EDT -----  Please call  Can you ask her if the skin lump on her neck is painful or if it has grown?  Thanks

## 2020-08-07 NOTE — TELEPHONE ENCOUNTER
Patient aware and verbally understood      ----- Message from [facility], [address] sent at 8/7/2020  5:21 PM EDT -----  Please call   The neck lump could be infected hair follicle, but since it has grown in size and is painful at times, I ordered a CT of the neck to clearly define what the mass really is  thanks

## 2020-08-09 DIAGNOSIS — J45.909 UNCOMPLICATED ASTHMA, UNSPECIFIED ASTHMA SEVERITY, UNSPECIFIED WHETHER PERSISTENT: ICD-10-CM

## 2020-08-10 ENCOUNTER — APPOINTMENT (OUTPATIENT)
Dept: LAB | Facility: CLINIC | Age: 45
End: 2020-08-10
Payer: COMMERCIAL

## 2020-08-10 DIAGNOSIS — G62.9 PERIPHERAL POLYNEUROPATHY: ICD-10-CM

## 2020-08-10 DIAGNOSIS — I10 BENIGN ESSENTIAL HYPERTENSION: ICD-10-CM

## 2020-08-10 DIAGNOSIS — E66.9 OBESITY WITH SERIOUS COMORBIDITY, UNSPECIFIED CLASSIFICATION, UNSPECIFIED OBESITY TYPE: ICD-10-CM

## 2020-08-10 DIAGNOSIS — E11.29 TYPE 2 DIABETES MELLITUS WITH MICROALBUMINURIA, WITHOUT LONG-TERM CURRENT USE OF INSULIN (HCC): ICD-10-CM

## 2020-08-10 DIAGNOSIS — N30.01 ACUTE CYSTITIS WITH HEMATURIA: ICD-10-CM

## 2020-08-10 DIAGNOSIS — N39.3 URINARY, INCONTINENCE, STRESS FEMALE: ICD-10-CM

## 2020-08-10 DIAGNOSIS — J45.20 MILD INTERMITTENT ASTHMA, UNSPECIFIED WHETHER COMPLICATED: ICD-10-CM

## 2020-08-10 DIAGNOSIS — R80.9 TYPE 2 DIABETES MELLITUS WITH MICROALBUMINURIA, WITHOUT LONG-TERM CURRENT USE OF INSULIN (HCC): ICD-10-CM

## 2020-08-10 LAB
25(OH)D3 SERPL-MCNC: 39.7 NG/ML (ref 30–100)
ALBUMIN SERPL BCP-MCNC: 3.8 G/DL (ref 3.5–5)
ALP SERPL-CCNC: 105 U/L (ref 46–116)
ALT SERPL W P-5'-P-CCNC: 28 U/L (ref 12–78)
ANION GAP SERPL CALCULATED.3IONS-SCNC: 7 MMOL/L (ref 4–13)
AST SERPL W P-5'-P-CCNC: 9 U/L (ref 5–45)
BACTERIA UR QL AUTO: ABNORMAL /HPF
BASOPHILS # BLD AUTO: 0.14 THOUSANDS/ΜL (ref 0–0.1)
BASOPHILS NFR BLD AUTO: 2 % (ref 0–1)
BILIRUB SERPL-MCNC: 0.45 MG/DL (ref 0.2–1)
BILIRUB UR QL STRIP: NEGATIVE
BUN SERPL-MCNC: 14 MG/DL (ref 5–25)
CALCIUM SERPL-MCNC: 9.7 MG/DL (ref 8.3–10.1)
CHLORIDE SERPL-SCNC: 109 MMOL/L (ref 100–108)
CHOLEST SERPL-MCNC: 127 MG/DL (ref 50–200)
CLARITY UR: CLEAR
CO2 SERPL-SCNC: 24 MMOL/L (ref 21–32)
COLOR UR: YELLOW
CREAT SERPL-MCNC: 0.7 MG/DL (ref 0.6–1.3)
EOSINOPHIL # BLD AUTO: 1.07 THOUSAND/ΜL (ref 0–0.61)
EOSINOPHIL NFR BLD AUTO: 11 % (ref 0–6)
ERYTHROCYTE [DISTWIDTH] IN BLOOD BY AUTOMATED COUNT: 13.2 % (ref 11.6–15.1)
EST. AVERAGE GLUCOSE BLD GHB EST-MCNC: 108 MG/DL
GFR SERPL CREATININE-BSD FRML MDRD: 106 ML/MIN/1.73SQ M
GLUCOSE P FAST SERPL-MCNC: 97 MG/DL (ref 65–99)
GLUCOSE UR STRIP-MCNC: NEGATIVE MG/DL
HBA1C MFR BLD: 5.4 %
HCT VFR BLD AUTO: 46 % (ref 34.8–46.1)
HDLC SERPL-MCNC: 34 MG/DL
HGB BLD-MCNC: 14.9 G/DL (ref 11.5–15.4)
HGB UR QL STRIP.AUTO: ABNORMAL
HYALINE CASTS #/AREA URNS LPF: ABNORMAL /LPF
IMM GRANULOCYTES # BLD AUTO: 0.03 THOUSAND/UL (ref 0–0.2)
IMM GRANULOCYTES NFR BLD AUTO: 0 % (ref 0–2)
KETONES UR STRIP-MCNC: NEGATIVE MG/DL
LDLC SERPL CALC-MCNC: 75 MG/DL (ref 0–100)
LEUKOCYTE ESTERASE UR QL STRIP: NEGATIVE
LYMPHOCYTES # BLD AUTO: 2.81 THOUSANDS/ΜL (ref 0.6–4.47)
LYMPHOCYTES NFR BLD AUTO: 30 % (ref 14–44)
MAGNESIUM SERPL-MCNC: 1.9 MG/DL (ref 1.6–2.6)
MCH RBC QN AUTO: 31.6 PG (ref 26.8–34.3)
MCHC RBC AUTO-ENTMCNC: 32.4 G/DL (ref 31.4–37.4)
MCV RBC AUTO: 98 FL (ref 82–98)
MONOCYTES # BLD AUTO: 0.58 THOUSAND/ΜL (ref 0.17–1.22)
MONOCYTES NFR BLD AUTO: 6 % (ref 4–12)
NEUTROPHILS # BLD AUTO: 4.86 THOUSANDS/ΜL (ref 1.85–7.62)
NEUTS SEG NFR BLD AUTO: 51 % (ref 43–75)
NITRITE UR QL STRIP: NEGATIVE
NON-SQ EPI CELLS URNS QL MICRO: ABNORMAL /HPF
NONHDLC SERPL-MCNC: 93 MG/DL
NRBC BLD AUTO-RTO: 0 /100 WBCS
PH UR STRIP.AUTO: 6 [PH]
PLATELET # BLD AUTO: 343 THOUSANDS/UL (ref 149–390)
PMV BLD AUTO: 11.5 FL (ref 8.9–12.7)
POTASSIUM SERPL-SCNC: 3.8 MMOL/L (ref 3.5–5.3)
PROT SERPL-MCNC: 7.8 G/DL (ref 6.4–8.2)
PROT UR STRIP-MCNC: ABNORMAL MG/DL
RBC # BLD AUTO: 4.71 MILLION/UL (ref 3.81–5.12)
RBC #/AREA URNS AUTO: ABNORMAL /HPF
SODIUM SERPL-SCNC: 140 MMOL/L (ref 136–145)
SP GR UR STRIP.AUTO: 1.03 (ref 1–1.03)
TRIGL SERPL-MCNC: 91 MG/DL
TSH SERPL DL<=0.05 MIU/L-ACNC: 1.46 UIU/ML (ref 0.36–3.74)
UROBILINOGEN UR QL STRIP.AUTO: 1 E.U./DL
WBC # BLD AUTO: 9.49 THOUSAND/UL (ref 4.31–10.16)
WBC #/AREA URNS AUTO: ABNORMAL /HPF

## 2020-08-10 PROCEDURE — 80061 LIPID PANEL: CPT

## 2020-08-10 PROCEDURE — 3044F HG A1C LEVEL LT 7.0%: CPT | Performed by: NURSE PRACTITIONER

## 2020-08-10 PROCEDURE — 80053 COMPREHEN METABOLIC PANEL: CPT

## 2020-08-10 PROCEDURE — 82306 VITAMIN D 25 HYDROXY: CPT

## 2020-08-10 PROCEDURE — 36415 COLL VENOUS BLD VENIPUNCTURE: CPT

## 2020-08-10 PROCEDURE — 85025 COMPLETE CBC W/AUTO DIFF WBC: CPT

## 2020-08-10 PROCEDURE — 81001 URINALYSIS AUTO W/SCOPE: CPT | Performed by: NURSE PRACTITIONER

## 2020-08-10 PROCEDURE — 83036 HEMOGLOBIN GLYCOSYLATED A1C: CPT

## 2020-08-10 PROCEDURE — 84443 ASSAY THYROID STIM HORMONE: CPT

## 2020-08-10 PROCEDURE — 83735 ASSAY OF MAGNESIUM: CPT

## 2020-08-10 RX ORDER — MONTELUKAST SODIUM 10 MG/1
TABLET ORAL
Qty: 15 TABLET | Refills: 5 | Status: SHIPPED | OUTPATIENT
Start: 2020-08-10 | End: 2020-11-12

## 2020-08-12 DIAGNOSIS — R22.1 LUMP IN NECK: Primary | ICD-10-CM

## 2020-08-14 ENCOUNTER — TELEPHONE (OUTPATIENT)
Dept: INTERNAL MEDICINE CLINIC | Facility: CLINIC | Age: 45
End: 2020-08-14

## 2020-08-14 DIAGNOSIS — R79.89 ABNORMAL CBC: Primary | ICD-10-CM

## 2020-08-14 NOTE — TELEPHONE ENCOUNTER
ASHLYNOV        ----- [facility] Samy Va[address] S[facility]chez sent at 8/14/2020  1:24 PM EDT -----  Please call  Her A1c is much improved, down from 6 to 5 4- good job  I also ordered a repeat CBC to be done this month, she had mild some elevations I want to recheck   thanks

## 2020-08-17 ENCOUNTER — TELEPHONE (OUTPATIENT)
Dept: INTERNAL MEDICINE CLINIC | Facility: CLINIC | Age: 45
End: 2020-08-17

## 2020-08-17 NOTE — TELEPHONE ENCOUNTER
[place] For a neck ct scan  Pre auth call back     [place] [phone]   -  manish    The facility shes doing it at is not a participating one       Please call them back

## 2020-08-19 ENCOUNTER — HOSPITAL ENCOUNTER (OUTPATIENT)
Dept: RADIOLOGY | Facility: MEDICAL CENTER | Age: 45
Discharge: HOME | End: 2020-08-19
Payer: COMMERCIAL

## 2020-08-19 DIAGNOSIS — R22.1 LUMP IN NECK: ICD-10-CM

## 2020-08-19 PROCEDURE — G1004 CDSM NDSC: HCPCS

## 2020-08-19 PROCEDURE — 70491 CT SOFT TISSUE NECK W/DYE: CPT

## 2020-08-19 RX ADMIN — IOHEXOL 85 ML: 350 INJECTION, SOLUTION INTRAVENOUS at 10:51

## 2020-08-24 ENCOUNTER — TELEPHONE (OUTPATIENT)
Dept: INTERNAL MEDICINE CLINIC | Facility: CLINIC | Age: 45
End: 2020-08-24

## 2020-08-24 NOTE — TELEPHONE ENCOUNTER
----- Message from [facility], [address] sent at 8/24/2020  7:54 AM EDT -----  Needs to be seen by derm asap--> this week thanks

## 2020-08-31 ENCOUNTER — OFFICE VISIT (OUTPATIENT)
Dept: DERMATOLOGY | Facility: CLINIC | Age: 45
End: 2020-08-31
Payer: COMMERCIAL

## 2020-08-31 VITALS — TEMPERATURE: 97.6 F

## 2020-08-31 DIAGNOSIS — Z13.89 SCREENING FOR SKIN CONDITION: ICD-10-CM

## 2020-08-31 DIAGNOSIS — L72.9 FOLLICULAR CYST OF SKIN: Primary | ICD-10-CM

## 2020-08-31 DIAGNOSIS — D22.9 NEVUS: ICD-10-CM

## 2020-08-31 PROCEDURE — 3060F POS MICROALBUMINURIA REV: CPT | Performed by: DERMATOLOGY

## 2020-08-31 PROCEDURE — 99203 OFFICE O/P NEW LOW 30 MIN: CPT | Performed by: DERMATOLOGY

## 2020-08-31 PROCEDURE — 3066F NEPHROPATHY DOC TX: CPT | Performed by: DERMATOLOGY

## 2020-08-31 PROCEDURE — 3078F DIAST BP <80 MM HG: CPT | Performed by: DERMATOLOGY

## 2020-08-31 PROCEDURE — 3044F HG A1C LEVEL LT 7.0%: CPT | Performed by: DERMATOLOGY

## 2020-08-31 PROCEDURE — 1036F TOBACCO NON-USER: CPT | Performed by: DERMATOLOGY

## 2020-08-31 PROCEDURE — 3074F SYST BP LT 130 MM HG: CPT | Performed by: DERMATOLOGY

## 2020-08-31 NOTE — PATIENT INSTRUCTIONS
Follicular cyst advised patient that this is from hair follicles that ballooned out and forms this type of growth  No treatment indicated unless patient so desires or if lesion enlarges or changes    Nevi reviewed the concept of ABCDE and saadly duckling nothing markedly atypical patient reassured  Screening for Dermatologic Disorders: Nothing else of concern noted on complete exam follow up prn

## 2020-08-31 NOTE — PROGRESS NOTES
[facility] DERMATOLOGY  [address] [address]  [phone]  [phone]     MRN: [id number] : 1975  Encounter: [id number]  Patient Information: Kristie Courtney  Chief complaint:  Painful cyst on nape of neck    History of present illness:  52-year-old female presents secondary to concerns regarding lesion on the posterior neck been present for several months which causes some discomfort at times    No other concerns noted  Past Medical History:   Diagnosis Date    Angina pectoris ([facility] [address] )     Anxiety     Atypical chest pain     Last Assessed: 9/3/2014    Diabetes mellitus ([facility] [address] )     Disease of thyroid gland     Endometriosis     Last Assessed:2016     Hypercalcemia     Last Assessed: 2017    Hyperlipidemia     Hyponatremia     Last assessed: 2016    Metrorrhagia     Last Assessed: 9/3/2014    Microscopic hematuria     Last Assessed: 2016    Obesity     PONV (postoperative nausea and vomiting)     Proteinuria     Shortness of breath     TIA (transient ischemic attack)      Past Surgical History:   Procedure Laterality Date    HYSTERECTOMY      Resolved: 2013    LAPAROSCOPIC CHOLECYSTECTOMY      Last Assessed: 10/17/2017    OOPHORECTOMY      Last Assessed: 2016    OVARIAN CYST REMOVAL Right     AR CYSTOURETHROSCOPY,FULGUR <0 5 CM CHUYITA N/A 3/23/2017    Procedure: Jimmie Funk; BLADDER BIOPSY; Enzo Rod ;  Surgeon: Neha Simpson MD;  Location: [facility];  Service: Urology     Social History   Social History     Substance and Sexual Activity   Alcohol Use No    Comment: social use per allscripts     Social History     Substance and Sexual Activity   Drug Use No     Social History     Tobacco Use   Smoking Status Never Smoker   Smokeless Tobacco Never Used     Family History   Problem Relation Age of Onset    Diabetes Mother     Hyperlipidemia Mother     Hypertension Mother     Heart disease Mother     Anxiety disorder Mother     Arthritis Mother     Depression Mother     Heart attack Mother     Hyperlipidemia Father     Hypertension Father     Alcohol abuse Father     Diabetes Sister     Asthma Sister     Melanoma Sister     Breast cancer Sister     Colon cancer Maternal Grandmother     Colon cancer Paternal Grandmother     Depression Child     Anxiety disorder Daughter     Depression Son     Arthritis Maternal Aunt     Depression Maternal Aunt     Diabetes Maternal Aunt     Hypertension Maternal Aunt     Breast cancer Maternal Aunt     Arthritis Paternal Aunt     Diabetes Paternal Aunt     Hypertension Paternal Aunt     Breast cancer Paternal Aunt     Diabetes Paternal Uncle     Arthritis Cousin     Hypertension Cousin     Substance Abuse Cousin     Diabetes Maternal Uncle      Meds/Allergies   Allergies   Allergen Reactions    Cephalosporins Anaphylaxis    Norflex [Orphenadrine] Anaphylaxis    Rocephin [Ceftriaxone] Anaphylaxis    Sulfa Antibiotics Hives    Ultracet [Tramadol-Acetaminophen] Hives       Meds:  Prior to Admission medications    Medication Sig Start Date End Date Taking?  Authorizing Provider   ACCU-CHEK FASTCLIX LANCETS [address] [facility] TEST 2 TIMES A DAY 4/26/18  Yes Historical Provider, MD   albuterol (PROVENTIL HFA,VENTOLIN HFA) 90 mcg/act inhaler Inhale 2 puffs every 6 (six) hours as needed for wheezing 1/13/20  Yes RUDDY Wynn   aspirin 81 MG tablet Take 1 tablet (81 mg total) by mouth daily 6/26/19  Yes Marla Garzon DO   atorvastatin (LIPITOR) 20 mg tablet Take 20 mg by mouth daily at bedtime 5/30/19  Yes Historical Provider, MD   ergocalciferol (VITAMIN D2) 50,000 units Take 1 capsule (50,000 Units total) by mouth once a week 1/13/20  Yes RUDDY Barrios   gabapentin (NEURONTIN) 100 mg capsule TAKE 1 CAPSULE AT BEDTIME X 1 WK,THEN 2 CAPSULES AT BEDTIME X 1 WK, THEN 3 CAPSULES AT BEDTIME 7/6/20  Yes RUDDY Barrios   glucagon (GLUCAGON EMERGENCY) 1 MG injection Inject as directed 7/6/16  Yes Historical Provider, MD   glucose blood (ACCU-CHEK GLORIA) test strip by In Vitro route 4 (four) times a day 8/7/17  Yes Historical Provider, MD   levothyroxine 75 mcg tablet Take 75 mcg by mouth daily   Yes Historical Provider, MD   lisinopril (ZESTRIL) 2 5 mg tablet Take 1 tablet (2 5 mg total) by mouth daily 7/20/20  Yes RUDDY Redman   metFORMIN (GLUCOPHAGE-XR) 750 mg 24 hr tablet TAKE 1 TABLET BY MOUTH 3 TIMES A DAY AFTER MEALS  Patient taking differently: Take by mouth 2 (two) times a day before breakfast and lunch  8/7/18  Yes Huseyin Osborne DO   montelukast (SINGULAIR) 10 mg tablet TAKE 1 TABLET BY MOUTH EVERYDAY AT BEDTIME 8/10/20  Yes RUDDY Barrios   naproxen (NAPROSYN) 500 mg tablet Take 1 tablet (500 mg total) by mouth 2 (two) times a day with meals 12/2/19  Yes Radha Sutton DO   pantoprazole (PROTONIX) 40 mg tablet TAKE 1 TABLET BY MOUTH EVERY DAY 1/30/20  Yes Anne John PA-C   phentermine 37 5 MG capsule Take 37 5 mg by mouth every morning   Yes Historical Provider, MD   SUMAtriptan (IMITREX) 25 mg tablet Take 1 tablet (25 mg total) by mouth once as needed for migraine for up to 30 doses 7/20/20  Yes RUDDY Barrios   topiramate (TOPAMAX) 50 MG tablet Take 1 tablet (50 mg total) by mouth 2 (two) times a day 7/20/20  Yes RUDDY Barrios   TRULICITY 1 5 XA/5 3YY SOPN INJECT SUBCUTANEOUSLY 1 5MG EVERY WEEK 6/22/19  Yes Historical Provider, MD       Subjective:     Review of Systems:    General: negative for - chills, fatigue, fever,  weight gain or weight loss  Psychological: negative for - anxiety, behavioral disorder, concentration difficulties, decreased libido, depression, irritability, memory difficulties, mood swings, sleep disturbances or suicidal ideation  ENT: negative for - hearing difficulties , nasal congestion, nasal discharge, oral lesions, sinus pain, sneezing, sore throat  Allergy and Immunology: negative for - hives, insect bite sensitivity,  Hematological and Lymphatic: negative for - bleeding problems, blood clots,bruising, swollen lymph nodes  Endocrine: negative for - hair pattern changes, hot flashes, malaise/lethargy, mood swings, palpitations, polydipsia/polyuria, skin changes, temperature intolerance or unexpected weight change  Respiratory: negative for - cough, hemoptysis, orthopnea, shortness of breath, or wheezing  Cardiovascular: negative for - chest pain, dyspnea on exertion, edema,  Gastrointestinal: negative for - abdominal pain, nausea/vomiting  Genito-Urinary: negative for - dysuria, incontinence, irregular/heavy menses or urinary frequency/urgency  Musculoskeletal: negative for - gait disturbance, joint pain, joint stiffness, joint swelling, muscle pain, muscular weakness  Dermatological:  As in HPI  Neurological: negative for confusion, dizziness, headaches, impaired coordination/balance, memory loss, numbness/tingling, seizures, speech problems, tremors or weakness       Objective:   Temp 97 6 °F (36 4 °C)     Physical Exam:    General Appearance:    Alert, cooperative, no distress   Head:    Normocephalic, without obvious abnormality, atraumatic           Skin:   A full skin exam was performed including scalp, head scalp, eyes, ears, nose, lips, neck, chest, axilla, abdomen, back, buttocks, bilateral upper extremities, bilateral lower extremities, hands, feet, fingers, toes, fingernails, and toenails 1 2 cm nodule on the nape of neck normal pigmented lesions regular shape and color numerous lesions noted nothing markedly atypical     Assessment:     1  Follicular cyst of skin     2  Nevus     3  Screening for skin condition           Plan: Follicular cyst advised patient that this is from hair follicles that ballooned out and forms this type of growth  No treatment indicated unless patient so desires or if lesion enlarges or changes    Nevi reviewed the concept of ABCDE and ugly duckling nothing markedly atypical patient reassured  Screening for Dermatologic Disorders: Nothing else of concern noted on complete exam follow up osiel Godoy MD  8/31/2020,2:43 PM    Portions of the record may have been created with voice recognition software   Occasional wrong word or "sound a like" substitutions may have occurred due to the inherent limitations of voice recognition software   Read the chart carefully and recognize, using context, where substitutions have occurred

## 2020-09-04 ENCOUNTER — TELEMEDICINE (OUTPATIENT)
Dept: INTERNAL MEDICINE CLINIC | Facility: CLINIC | Age: 45
End: 2020-09-04
Payer: COMMERCIAL

## 2020-09-04 VITALS — BODY MASS INDEX: 27.46 KG/M2 | TEMPERATURE: 97.3 F | WEIGHT: 155 LBS

## 2020-09-04 DIAGNOSIS — R80.9 DIABETES MELLITUS WITH PROTEINURIA (HCC): ICD-10-CM

## 2020-09-04 DIAGNOSIS — E11.29 TYPE 2 DIABETES MELLITUS WITH MICROALBUMINURIA, WITHOUT LONG-TERM CURRENT USE OF INSULIN (HCC): Primary | ICD-10-CM

## 2020-09-04 DIAGNOSIS — E11.29 DIABETES MELLITUS WITH PROTEINURIA (HCC): ICD-10-CM

## 2020-09-04 DIAGNOSIS — I10 BENIGN ESSENTIAL HYPERTENSION: ICD-10-CM

## 2020-09-04 DIAGNOSIS — R80.9 TYPE 2 DIABETES MELLITUS WITH MICROALBUMINURIA, WITHOUT LONG-TERM CURRENT USE OF INSULIN (HCC): Primary | ICD-10-CM

## 2020-09-04 DIAGNOSIS — G43.009 MIGRAINE WITHOUT AURA AND WITHOUT STATUS MIGRAINOSUS, NOT INTRACTABLE: ICD-10-CM

## 2020-09-04 PROCEDURE — 3066F NEPHROPATHY DOC TX: CPT | Performed by: NURSE PRACTITIONER

## 2020-09-04 PROCEDURE — 1036F TOBACCO NON-USER: CPT | Performed by: NURSE PRACTITIONER

## 2020-09-04 PROCEDURE — 99214 OFFICE O/P EST MOD 30 MIN: CPT | Performed by: NURSE PRACTITIONER

## 2020-09-04 NOTE — PATIENT INSTRUCTIONS
1  Migraine headaches- Stable on Topiramate  Continue this medication and you can also take Imitrex as needed  2  Follicular cyst- Followed by dermatology, will be having surgery next week  3  Diabetes- At goal  A1c 5 4, continue present medications, followed by endocrinology  4  Hyperlipidemia at goal  Continue Atorvastatin  5  Hypothyroidism- TSH at goal, continue present dose of Levothyroxine  Follow up as scheduled in February

## 2020-09-04 NOTE — PROGRESS NOTES
Virtual Regular Visit      Assessment/Plan:    Problem List Items Addressed This Visit        Endocrine    Diabetes mellitus with proteinuria ([address] )    Relevant Orders    CBC and differential    Comprehensive metabolic panel    HEMOGLOBIN A1C W/ EAG ESTIMATION    Lipid panel    TSH, 3rd generation with Free T4 reflex    Type 2 diabetes mellitus with microalbuminuria (HCC) - Primary    Relevant Orders    CBC and differential    Comprehensive metabolic panel    HEMOGLOBIN A1C W/ EAG ESTIMATION    Lipid panel    TSH, 3rd generation with Free T4 reflex       Cardiovascular and Mediastinum    Benign essential hypertension    Relevant Orders    CBC and differential    Comprehensive metabolic panel    HEMOGLOBIN A1C W/ EAG ESTIMATION    Lipid panel    TSH, 3rd generation with Free T4 reflex    Migraine without aura and without status migrainosus, not intractable    Relevant Orders    CBC and differential    Comprehensive metabolic panel    HEMOGLOBIN A1C W/ EAG ESTIMATION    Lipid panel    TSH, 3rd generation with Free T4 reflex        1  Migraine headaches- Stable on Topiramate  Continue this medication and you can also take Imitrex as needed  2  Follicular cyst- Followed by dermatology, will be having surgery next week  3  Diabetes- At goal  A1c 5 4, continue present medications, followed by endocrinology  4  Hyperlipidemia at goal  Continue Atorvastatin  5  Hypothyroidism- TSH at goal, continue present dose of Levothyroxine  Follow up as scheduled in February  Reason for visit is   Chief Complaint   Patient presents with    Virtual Regular Visit     Patient presents for f/u   Virtual Regular Visit        Encounter provider Samy Wynn    Provider located at [address]  [address] [address] [phone]      Recent Visits  No visits were found meeting these conditions     Showing recent visits within past 7 days and meeting all other requirements     Today's Visits  Date Type Provider Dept   09/04/20 Telemedicine [place], [facility] Internal Med [address] today's visits and meeting all other requirements     Future Appointments  No visits were found meeting these conditions  Showing future appointments within next 150 days and meeting all other requirements        The patient was identified by name and date of birth  Merle Piper was informed that this is a telemedicine visit and that the visit is being conducted through [employer] and patient was informed that this is not a secure, HIPAA-complaint platform  She agrees to proceed     My office door was closed  No one else was in the room  She acknowledged consent and understanding of privacy and security of the video platform  The patient has agreed to participate and understands they can discontinue the visit at any time  Patient is aware this is a billable service  Subjective  Merle Piper is a 41 y o  female with migraines   Increased dose of Topiramate has decreased the frequency of her headaches  She took Imitrex only once and had relief          Past Medical History:   Diagnosis Date    Angina pectoris ([facility] [address] )     Anxiety     Atypical chest pain     Last Assessed: 9/3/2014    Diabetes mellitus ([facility] [address] )     Disease of thyroid gland     Endometriosis     Last Assessed:9/13/2016     Hypercalcemia     Last Assessed: 4/27/2017    Hyperlipidemia     Hyponatremia     Last assessed: 8/17/2016    Metrorrhagia     Last Assessed: 9/3/2014    Microscopic hematuria     Last Assessed: 9/14/2016    Obesity     PONV (postoperative nausea and vomiting)     Proteinuria     Shortness of breath     TIA (transient ischemic attack)        Past Surgical History:   Procedure Laterality Date    HYSTERECTOMY      Resolved: 12/2013    LAPAROSCOPIC CHOLECYSTECTOMY      Last Assessed: 10/17/2017    OOPHORECTOMY      Last Assessed: 7/6/2016  OVARIAN CYST REMOVAL Right     WI CYSTOURETHROSCOPY,FULGUR <0 5 CM CHUYITA N/A 3/23/2017    Procedure: Ori Maria Del Rosarioa; BLADDER BIOPSY; Katelynn Concepcion ;  Surgeon: Jose Manuel Boyer MD;  Location: [facility];  Service: Urology       Current Outpatient Medications   Medication Sig Dispense Refill    ACCU-CHEK FASTCLIX LANCETS MISC TEST 2 TIMES A DAY      albuterol (PROVENTIL HFA,VENTOLIN HFA) 90 mcg/act inhaler Inhale 2 puffs every 6 (six) hours as needed for wheezing 1 Inhaler 5    aspirin 81 MG tablet Take 1 tablet (81 mg total) by mouth daily 100 tablet 2    atorvastatin (LIPITOR) 20 mg tablet Take 20 mg by mouth daily at bedtime  2    ergocalciferol (VITAMIN D2) 50,000 units Take 1 capsule (50,000 Units total) by mouth once a week 12 capsule 2    gabapentin (NEURONTIN) 100 mg capsule TAKE 1 CAPSULE AT BEDTIME X 1 WK,THEN 2 CAPSULES AT BEDTIME X 1 WK, THEN 3 CAPSULES AT BEDTIME 90 capsule 1    glucagon (GLUCAGON EMERGENCY) 1 MG injection Inject as directed      glucose blood (ACCU-CHEK GLORIA) test strip by In Vitro route 4 (four) times a day      levothyroxine 75 mcg tablet Take 75 mcg by mouth daily      lisinopril (ZESTRIL) 2 5 mg tablet Take 1 tablet (2 5 mg total) by mouth daily 90 tablet 2    metFORMIN (GLUCOPHAGE-XR) 750 mg 24 hr tablet TAKE 1 TABLET BY MOUTH 3 TIMES A DAY AFTER MEALS (Patient taking differently: Take by mouth 2 (two) times a day before breakfast and lunch ) 90 tablet 3    montelukast (SINGULAIR) 10 mg tablet TAKE 1 TABLET BY MOUTH EVERYDAY AT BEDTIME 15 tablet 5    naproxen (NAPROSYN) 500 mg tablet Take 1 tablet (500 mg total) by mouth 2 (two) times a day with meals 30 tablet 0    pantoprazole (PROTONIX) 40 mg tablet TAKE 1 TABLET BY MOUTH EVERY DAY 30 tablet 0    phentermine 37 5 MG capsule Take 37 5 mg by mouth every morning      SUMAtriptan (IMITREX) 25 mg tablet Take 1 tablet (25 mg total) by mouth once as needed for migraine for up to 30 doses 30 tablet 0    topiramate (TOPAMAX) 50 MG tablet Take 1 tablet (50 mg total) by mouth 2 (two) times a day 180 tablet 1    TRULICITY 1 5 WZ/0 6BB SOPN INJECT SUBCUTANEOUSLY 1 5MG EVERY WEEK  1     No current facility-administered medications for this visit  Allergies   Allergen Reactions    Cephalosporins Anaphylaxis    Norflex [Orphenadrine] Anaphylaxis    Rocephin [Ceftriaxone] Anaphylaxis    Sulfa Antibiotics Hives    Ultracet [Tramadol-Acetaminophen] Hives       Review of Systems   Constitutional: Negative  Respiratory: Negative  Cardiovascular: Negative  Musculoskeletal: Negative  Psychiatric/Behavioral: Negative  Video Exam    Vitals:    09/04/20 1502   Temp: (!) 97 3 °F (36 3 °C)   Weight: 70 3 kg (155 lb)       Physical Exam  Constitutional:       Appearance: Normal appearance  Neurological:      Mental Status: She is alert and oriented to person, place, and time  Psychiatric:         Mood and Affect: Mood normal          Behavior: Behavior normal          Thought Content: Thought content normal          Judgment: Judgment normal           I spent 20 minutes directly with the patient during this visit      VIRTUAL VISIT [address] [place] [address] acknowledges that she has consented to an online visit or consultation  She understands that the online visit is based solely on information provided by her, and that, in the absence of a face-to-face physical evaluation by the physician, the diagnosis she receives is both limited and provisional in terms of accuracy and completeness  This is not intended to replace a full medical face-to-face evaluation by the physician  Kati Vee understands and accepts these terms

## 2020-09-16 ENCOUNTER — PROCEDURE VISIT (OUTPATIENT)
Dept: DERMATOLOGY | Facility: CLINIC | Age: 45
End: 2020-09-16
Payer: COMMERCIAL

## 2020-09-16 VITALS — TEMPERATURE: 97.1 F

## 2020-09-16 DIAGNOSIS — R22.1 LUMP IN NECK: Primary | ICD-10-CM

## 2020-09-16 PROCEDURE — 12041 INTMD RPR N-HF/GENIT 2.5CM/<: CPT | Performed by: DERMATOLOGY

## 2020-09-16 PROCEDURE — 11422 EXC H-F-NK-SP B9+MARG 1.1-2: CPT | Performed by: DERMATOLOGY

## 2020-09-16 PROCEDURE — 88304 TISSUE EXAM BY PATHOLOGIST: CPT | Performed by: STUDENT IN AN ORGANIZED HEALTH CARE EDUCATION/TRAINING PROGRAM

## 2020-09-16 NOTE — PROGRESS NOTES
[address]  [facility]  [address] [address]  [phone]  [phone]     MRN: [id number] : 1975  Encounter: [id number]  Patient Information: Vanessa Jennifer    Subjective:     52-year-old female presents for excision of previously presumed cyst posterior neck     Objective:   Temp (!) 97 1 °F (36 2 °C)     Physical Exam:    General Appearance:    Alert, cooperative, no distress   Skin:   Previous noted nodule is present  Procedure name:  Simple Excision with primary closure of cyst        Location:  Nape of neck    Diagnosis:  Question follicular cyst rule out lymph node    Size of lesion: 1 2 cm      I explained the diagnosis to the patient and we recommend an excision of the lesion for diagnosis and/or treatment  Potential complications include, but are not limited to: scarring, bleeding, infection, incomplete removal, recurrence, and nerve damage  The risks, benefits, and alternatives were discussed with the patient in detail  The location of the tumor was identified, circled, and confirmed by the patient  The correct patient, site, and procedure were confirmed  Anesthesia: 1% xylocaine with 1:100,000 epinephrine 3  cc  The patient was brought into the room, prepped and draped sterilely in the usual manner and anesthesia was administered by local infiltration  A linear incision was drawn across the lesion, and the area was incised to the cyst wall with a number 15c Bard-Antonio blade  The nodule was removed with sharp and blunt dissection as best we could  Hemostasis was not an issue  Lesion appear to be more nodular A primary closure was obtained The wound was cleannitiallyed with hydrogen peroxide, dried off, petroleum applied, and the wound was covered with a pressure dressing  The patient was given detailed verbal and written instructions on postoperative care        Suture for adipose/fascial/dermal layer closure: 4-0  vicryl    Suture used to approximate epidermis: 5-0  nylon    Final wound length: 1 2 cm    Follow-up in: 1 weeks  Assessment:     1  Lump in neck           Plan:   Wound care instructions given to patient await results of biopsy to delineate etiology      Prior to Admission medications    Medication Sig Start Date End Date Taking?  Authorizing Provider   ACCU-CHEK FASTCLIX LANCETS [address] [facility] TEST 2 TIMES A DAY 4/26/18  Yes Historical Provider, MD   albuterol (PROVENTIL HFA,VENTOLIN HFA) 90 mcg/act inhaler Inhale 2 puffs every 6 (six) hours as needed for wheezing 1/13/20  Yes RUDDY Wynn   aspirin 81 MG tablet Take 1 tablet (81 mg total) by mouth daily 6/26/19  Yes Jessy Bell DO   atorvastatin (LIPITOR) 20 mg tablet Take 20 mg by mouth daily at bedtime 5/30/19  Yes Historical Provider, MD   ergocalciferol (VITAMIN D2) 50,000 units Take 1 capsule (50,000 Units total) by mouth once a week 1/13/20  Yes RUDDY Wynn   gabapentin (NEURONTIN) 100 mg capsule TAKE 1 CAPSULE AT BEDTIME X 1 WK,THEN 2 CAPSULES AT BEDTIME X 1 WK, THEN 3 CAPSULES AT BEDTIME 7/6/20  Yes RUDDY Barrios   glucagon (GLUCAGON EMERGENCY) 1 MG injection Inject as directed 7/6/16  Yes Historical Provider, MD   glucose blood (ACCU-CHEK GLORIA) test strip by In Vitro route 4 (four) times a day 8/7/17  Yes Historical Provider, MD   levothyroxine 75 mcg tablet Take 75 mcg by mouth daily   Yes Historical Provider, MD   lisinopril (ZESTRIL) 2 5 mg tablet Take 1 tablet (2 5 mg total) by mouth daily 7/20/20  Yes RUDDY Wynn   metFORMIN (GLUCOPHAGE-XR) 750 mg 24 hr tablet TAKE 1 TABLET BY MOUTH 3 TIMES A DAY AFTER MEALS  Patient taking differently: Take by mouth 2 (two) times a day before breakfast and lunch  8/7/18  Yes Jessy Bell DO   montelukast (SINGULAIR) 10 mg tablet TAKE 1 TABLET BY MOUTH EVERYDAY AT BEDTIME 8/10/20  Yes RUDDY Barrios   naproxen (NAPROSYN) 500 mg tablet Take 1 tablet (500 mg total) by mouth 2 (two) times a day with meals 12/2/19  Yes Radha Sutton DO   pantoprazole (PROTONIX) 40 mg tablet TAKE 1 TABLET BY MOUTH EVERY DAY 1/30/20  Yes Anne John PA-C   phentermine 37 5 MG capsule Take 37 5 mg by mouth every morning   Yes Historical Provider, MD   SUMAtriptan (IMITREX) 25 mg tablet Take 1 tablet (25 mg total) by mouth once as needed for migraine for up to 30 doses 7/20/20  Yes RUDDY Barrios   topiramate (TOPAMAX) 50 MG tablet Take 1 tablet (50 mg total) by mouth 2 (two) times a day 7/20/20  Yes RUDDY Barrios   TRULICITY 1 5 XI/1 8AL SOPN INJECT SUBCUTANEOUSLY 1 5MG EVERY WEEK 6/22/19  Yes Historical Provider, MD     Allergies   Allergen Reactions    Cephalosporins Anaphylaxis    Norflex [Orphenadrine] Anaphylaxis    Rocephin [Ceftriaxone] Anaphylaxis    Sulfa Antibiotics Hives    Ultracet [Tramadol-Acetaminophen] Rachelle Dubin, MD  9/16/2020,3:30 PM    Portions of the record may have been created with voice recognition software   Occasional wrong word or "sound a like" substitutions may have occurred due to the inherent limitations of voice recognition software   Read the chart carefully and recognize, using context, where substitutions have occurred

## 2020-09-21 ENCOUNTER — TELEPHONE (OUTPATIENT)
Dept: DERMATOLOGY | Facility: CLINIC | Age: 45
End: 2020-09-21

## 2020-09-21 NOTE — TELEPHONE ENCOUNTER
----- Message from Kenneth Knowles MD sent at 9/21/2020 11:42 AM EDT -----  Please call her of normal pathology

## 2020-09-23 ENCOUNTER — OFFICE VISIT (OUTPATIENT)
Dept: DERMATOLOGY | Facility: CLINIC | Age: 45
End: 2020-09-23

## 2020-09-23 VITALS — TEMPERATURE: 97.2 F

## 2020-09-23 DIAGNOSIS — L72.11 PILAR CYST: Primary | ICD-10-CM

## 2020-09-23 PROCEDURE — 99024 POSTOP FOLLOW-UP VISIT: CPT | Performed by: DERMATOLOGY

## 2020-09-23 NOTE — PROGRESS NOTES
[address]  [address] [facility] [address]  [phone]  [phone]     MRN: [id number] : 1975  Encounter: [id number]  Patient Information: Quinn Sevilla    Subjective:     62-year-old female presents for follow-up secondary to previously excised Pilar cyst posterior scalp     Objective:   Temp (!) 97 2 °F (36 2 °C)     Physical Exam:    General Appearance:    Alert, cooperative, no distress   Skin:  Previous site excision healing well  Procedure:  Suture removal  Site of excision:  Posterior scalp  Wound was cleansed with saline  Wound is intact  Sutures removed  Steri-Strips applied  Biopsy revealed a proliferative Pilar cyst     Assessment:     1  Pilar cyst           Plan:   No further treatment needed will plan follow-up if any signs of regrowth      Prior to Admission medications    Medication Sig Start Date End Date Taking?  Authorizing Provider   ACCU-CHEK FASTCLIX LANCETS MISC TEST 2 TIMES A DAY 18   Historical Provider, MD   albuterol (PROVENTIL HFA,VENTOLIN HFA) 90 mcg/act inhaler Inhale 2 puffs every 6 (six) hours as needed for wheezing 20   RUDDY Wynn   aspirin 81 MG tablet Take 1 tablet (81 mg total) by mouth daily 19   Alexandrea Rodas DO   atorvastatin (LIPITOR) 20 mg tablet Take 20 mg by mouth daily at bedtime 19   Historical Provider, MD   ergocalciferol (VITAMIN D2) 50,000 units Take 1 capsule (50,000 Units total) by mouth once a week 20   RUDDY Wynn   gabapentin (NEURONTIN) 100 mg capsule TAKE 1 CAPSULE AT BEDTIME X 1 WK,THEN 2 CAPSULES AT BEDTIME X 1 WK, THEN 3 CAPSULES AT BEDTIME 20   RUDDY Barrios   glucagon (GLUCAGON EMERGENCY) 1 MG injection Inject as directed 16   Historical Provider, MD   glucose blood (ACCU-CHEK GLORIA) test strip by In Vitro route 4 (four) times a day 17   Historical Provider, MD   levothyroxine 75 mcg tablet Take 75 mcg by mouth daily Historical Provider, MD   lisinopril (ZESTRIL) 2 5 mg tablet Take 1 tablet (2 5 mg total) by mouth daily 7/20/20   RUDDY Wynn   metFORMIN (GLUCOPHAGE-XR) 750 mg 24 hr tablet TAKE 1 TABLET BY MOUTH 3 TIMES A DAY AFTER MEALS  Patient taking differently: Take by mouth 2 (two) times a day before breakfast and lunch  8/7/18   German Swan DO   montelukast (SINGULAIR) 10 mg tablet TAKE 1 TABLET BY MOUTH EVERYDAY AT BEDTIME 8/10/20   RUDDY Barrios   naproxen (NAPROSYN) 500 mg tablet Take 1 tablet (500 mg total) by mouth 2 (two) times a day with meals 12/2/19   Radha Sutton DO   pantoprazole (PROTONIX) 40 mg tablet TAKE 1 TABLET BY MOUTH EVERY DAY 1/30/20   Anne John PA-C   phentermine 37 5 MG capsule Take 37 5 mg by mouth every morning    Historical Provider, MD   SUMAtriptan (IMITREX) 25 mg tablet Take 1 tablet (25 mg total) by mouth once as needed for migraine for up to 30 doses 7/20/20   RUDDY Wynn   topiramate (TOPAMAX) 50 MG tablet Take 1 tablet (50 mg total) by mouth 2 (two) times a day 7/20/20   RUDDY Wynn   TRULICITY 1 5 HC/4 9SY SOPN INJECT SUBCUTANEOUSLY 1 5MG EVERY WEEK 6/22/19   Historical Provider, MD     Allergies   Allergen Reactions    Cephalosporins Anaphylaxis    Norflex [Orphenadrine] Anaphylaxis    Rocephin [Ceftriaxone] Anaphylaxis    Sulfa Antibiotics Hives    Ultracet [Tramadol-Acetaminophen] Carson Haskins MD  1/73/8783,5:79 AM    Portions of the record may have been created with voice recognition software   Occasional wrong word or "sound a like" substitutions may have occurred due to the inherent limitations of voice recognition software   Read the chart carefully and recognize, using context, where substitutions have occurred

## 2020-11-10 DIAGNOSIS — R80.9 TYPE 2 DIABETES MELLITUS WITH MICROALBUMINURIA, WITHOUT LONG-TERM CURRENT USE OF INSULIN (HCC): ICD-10-CM

## 2020-11-10 DIAGNOSIS — I10 BENIGN ESSENTIAL HYPERTENSION: ICD-10-CM

## 2020-11-10 DIAGNOSIS — N39.3 URINARY, INCONTINENCE, STRESS FEMALE: ICD-10-CM

## 2020-11-10 DIAGNOSIS — E11.29 TYPE 2 DIABETES MELLITUS WITH MICROALBUMINURIA, WITHOUT LONG-TERM CURRENT USE OF INSULIN (HCC): ICD-10-CM

## 2020-11-10 DIAGNOSIS — G62.9 PERIPHERAL POLYNEUROPATHY: ICD-10-CM

## 2020-11-10 DIAGNOSIS — J45.20 MILD INTERMITTENT ASTHMA, UNSPECIFIED WHETHER COMPLICATED: ICD-10-CM

## 2020-11-10 DIAGNOSIS — N30.01 ACUTE CYSTITIS WITH HEMATURIA: ICD-10-CM

## 2020-11-10 DIAGNOSIS — E66.9 OBESITY WITH SERIOUS COMORBIDITY, UNSPECIFIED CLASSIFICATION, UNSPECIFIED OBESITY TYPE: ICD-10-CM

## 2020-11-11 ENCOUNTER — CLINICAL SUPPORT (OUTPATIENT)
Dept: INTERNAL MEDICINE CLINIC | Facility: CLINIC | Age: 45
End: 2020-11-11
Payer: COMMERCIAL

## 2020-11-11 DIAGNOSIS — Z23 ENCOUNTER FOR IMMUNIZATION: ICD-10-CM

## 2020-11-11 PROCEDURE — 90471 IMMUNIZATION ADMIN: CPT

## 2020-11-11 PROCEDURE — 90686 IIV4 VACC NO PRSV 0.5 ML IM: CPT

## 2020-11-11 RX ORDER — GABAPENTIN 100 MG/1
CAPSULE ORAL
Qty: 90 CAPSULE | Refills: 1 | Status: SHIPPED | OUTPATIENT
Start: 2020-11-11 | End: 2021-02-23 | Stop reason: SDUPTHER

## 2020-11-12 DIAGNOSIS — J45.909 UNCOMPLICATED ASTHMA, UNSPECIFIED ASTHMA SEVERITY, UNSPECIFIED WHETHER PERSISTENT: ICD-10-CM

## 2020-11-12 RX ORDER — MONTELUKAST SODIUM 10 MG/1
TABLET ORAL
Qty: 15 TABLET | Refills: 5 | Status: SHIPPED | OUTPATIENT
Start: 2020-11-12 | End: 2021-02-23 | Stop reason: SDUPTHER

## 2021-01-28 ENCOUNTER — LAB (OUTPATIENT)
Dept: LAB | Facility: CLINIC | Age: 46
End: 2021-01-28
Payer: COMMERCIAL

## 2021-01-28 DIAGNOSIS — E11.29 DIABETES MELLITUS WITH PROTEINURIA (HCC): ICD-10-CM

## 2021-01-28 DIAGNOSIS — G43.009 MIGRAINE WITHOUT AURA AND WITHOUT STATUS MIGRAINOSUS, NOT INTRACTABLE: ICD-10-CM

## 2021-01-28 DIAGNOSIS — E11.29 TYPE 2 DIABETES MELLITUS WITH MICROALBUMINURIA, WITHOUT LONG-TERM CURRENT USE OF INSULIN (HCC): ICD-10-CM

## 2021-01-28 DIAGNOSIS — R80.9 DIABETES MELLITUS WITH PROTEINURIA (HCC): ICD-10-CM

## 2021-01-28 DIAGNOSIS — R80.9 TYPE 2 DIABETES MELLITUS WITH MICROALBUMINURIA, WITHOUT LONG-TERM CURRENT USE OF INSULIN (HCC): ICD-10-CM

## 2021-01-28 DIAGNOSIS — I10 BENIGN ESSENTIAL HYPERTENSION: ICD-10-CM

## 2021-01-28 LAB
ALBUMIN SERPL BCP-MCNC: 3.6 G/DL (ref 3.5–5)
ALP SERPL-CCNC: 103 U/L (ref 46–116)
ALT SERPL W P-5'-P-CCNC: 42 U/L (ref 12–78)
ANION GAP SERPL CALCULATED.3IONS-SCNC: 5 MMOL/L (ref 4–13)
AST SERPL W P-5'-P-CCNC: 15 U/L (ref 5–45)
BASOPHILS # BLD AUTO: 0.06 THOUSANDS/ΜL (ref 0–0.1)
BASOPHILS NFR BLD AUTO: 1 % (ref 0–1)
BILIRUB SERPL-MCNC: 0.39 MG/DL (ref 0.2–1)
BUN SERPL-MCNC: 15 MG/DL (ref 5–25)
CALCIUM SERPL-MCNC: 9.4 MG/DL (ref 8.3–10.1)
CHLORIDE SERPL-SCNC: 110 MMOL/L (ref 100–108)
CHOLEST SERPL-MCNC: 116 MG/DL (ref 50–200)
CO2 SERPL-SCNC: 25 MMOL/L (ref 21–32)
CREAT SERPL-MCNC: 0.68 MG/DL (ref 0.6–1.3)
EOSINOPHIL # BLD AUTO: 0.12 THOUSAND/ΜL (ref 0–0.61)
EOSINOPHIL NFR BLD AUTO: 1 % (ref 0–6)
ERYTHROCYTE [DISTWIDTH] IN BLOOD BY AUTOMATED COUNT: 13.2 % (ref 11.6–15.1)
EST. AVERAGE GLUCOSE BLD GHB EST-MCNC: 108 MG/DL
GFR SERPL CREATININE-BSD FRML MDRD: 107 ML/MIN/1.73SQ M
GLUCOSE P FAST SERPL-MCNC: 93 MG/DL (ref 65–99)
HBA1C MFR BLD: 5.4 %
HCT VFR BLD AUTO: 40.2 % (ref 34.8–46.1)
HDLC SERPL-MCNC: 41 MG/DL
HGB BLD-MCNC: 12.8 G/DL (ref 11.5–15.4)
IMM GRANULOCYTES # BLD AUTO: 0.03 THOUSAND/UL (ref 0–0.2)
IMM GRANULOCYTES NFR BLD AUTO: 0 % (ref 0–2)
LDLC SERPL CALC-MCNC: 59 MG/DL (ref 0–100)
LYMPHOCYTES # BLD AUTO: 3.02 THOUSANDS/ΜL (ref 0.6–4.47)
LYMPHOCYTES NFR BLD AUTO: 34 % (ref 14–44)
MCH RBC QN AUTO: 31.4 PG (ref 26.8–34.3)
MCHC RBC AUTO-ENTMCNC: 31.8 G/DL (ref 31.4–37.4)
MCV RBC AUTO: 99 FL (ref 82–98)
MONOCYTES # BLD AUTO: 0.58 THOUSAND/ΜL (ref 0.17–1.22)
MONOCYTES NFR BLD AUTO: 7 % (ref 4–12)
NEUTROPHILS # BLD AUTO: 5.11 THOUSANDS/ΜL (ref 1.85–7.62)
NEUTS SEG NFR BLD AUTO: 57 % (ref 43–75)
NONHDLC SERPL-MCNC: 75 MG/DL
NRBC BLD AUTO-RTO: 0 /100 WBCS
PLATELET # BLD AUTO: 334 THOUSANDS/UL (ref 149–390)
PMV BLD AUTO: 11.4 FL (ref 8.9–12.7)
POTASSIUM SERPL-SCNC: 3.9 MMOL/L (ref 3.5–5.3)
PROT SERPL-MCNC: 6.8 G/DL (ref 6.4–8.2)
RBC # BLD AUTO: 4.08 MILLION/UL (ref 3.81–5.12)
SODIUM SERPL-SCNC: 140 MMOL/L (ref 136–145)
TRIGL SERPL-MCNC: 78 MG/DL
TSH SERPL DL<=0.05 MIU/L-ACNC: 2.13 UIU/ML (ref 0.36–3.74)
WBC # BLD AUTO: 8.92 THOUSAND/UL (ref 4.31–10.16)

## 2021-01-28 PROCEDURE — 83036 HEMOGLOBIN GLYCOSYLATED A1C: CPT

## 2021-01-28 PROCEDURE — 80053 COMPREHEN METABOLIC PANEL: CPT

## 2021-01-28 PROCEDURE — 85025 COMPLETE CBC W/AUTO DIFF WBC: CPT

## 2021-01-28 PROCEDURE — 80061 LIPID PANEL: CPT

## 2021-01-28 PROCEDURE — 84443 ASSAY THYROID STIM HORMONE: CPT

## 2021-01-28 PROCEDURE — 36415 COLL VENOUS BLD VENIPUNCTURE: CPT

## 2021-02-23 ENCOUNTER — TELEPHONE (OUTPATIENT)
Dept: ADMINISTRATIVE | Facility: OTHER | Age: 46
End: 2021-02-23

## 2021-02-23 ENCOUNTER — OFFICE VISIT (OUTPATIENT)
Dept: INTERNAL MEDICINE CLINIC | Facility: CLINIC | Age: 46
End: 2021-02-23
Payer: COMMERCIAL

## 2021-02-23 VITALS
HEIGHT: 63 IN | SYSTOLIC BLOOD PRESSURE: 120 MMHG | BODY MASS INDEX: 24.77 KG/M2 | RESPIRATION RATE: 18 BRPM | TEMPERATURE: 97.5 F | OXYGEN SATURATION: 98 % | DIASTOLIC BLOOD PRESSURE: 70 MMHG | HEART RATE: 75 BPM | WEIGHT: 139.8 LBS

## 2021-02-23 DIAGNOSIS — J45.20 MILD INTERMITTENT ASTHMA, UNSPECIFIED WHETHER COMPLICATED: ICD-10-CM

## 2021-02-23 DIAGNOSIS — E03.9 HYPOTHYROIDISM, UNSPECIFIED TYPE: ICD-10-CM

## 2021-02-23 DIAGNOSIS — R80.9 TYPE 2 DIABETES MELLITUS WITH MICROALBUMINURIA, WITHOUT LONG-TERM CURRENT USE OF INSULIN (HCC): Primary | ICD-10-CM

## 2021-02-23 DIAGNOSIS — N39.3 URINARY, INCONTINENCE, STRESS FEMALE: ICD-10-CM

## 2021-02-23 DIAGNOSIS — R80.1 PERSISTENT PROTEINURIA: ICD-10-CM

## 2021-02-23 DIAGNOSIS — I10 BENIGN ESSENTIAL HYPERTENSION: ICD-10-CM

## 2021-02-23 DIAGNOSIS — E66.9 OBESITY WITH SERIOUS COMORBIDITY, UNSPECIFIED CLASSIFICATION, UNSPECIFIED OBESITY TYPE: ICD-10-CM

## 2021-02-23 DIAGNOSIS — G62.9 PERIPHERAL POLYNEUROPATHY: ICD-10-CM

## 2021-02-23 DIAGNOSIS — E11.29 TYPE 2 DIABETES MELLITUS WITH MICROALBUMINURIA, WITHOUT LONG-TERM CURRENT USE OF INSULIN (HCC): Primary | ICD-10-CM

## 2021-02-23 DIAGNOSIS — J45.909 UNCOMPLICATED ASTHMA, UNSPECIFIED ASTHMA SEVERITY, UNSPECIFIED WHETHER PERSISTENT: ICD-10-CM

## 2021-02-23 DIAGNOSIS — E11.21 TYPE 2 DIABETES MELLITUS WITH DIABETIC NEPHROPATHY, WITHOUT LONG-TERM CURRENT USE OF INSULIN (HCC): ICD-10-CM

## 2021-02-23 DIAGNOSIS — I10 ESSENTIAL HYPERTENSION: ICD-10-CM

## 2021-02-23 DIAGNOSIS — G43.009 MIGRAINE WITHOUT AURA AND WITHOUT STATUS MIGRAINOSUS, NOT INTRACTABLE: ICD-10-CM

## 2021-02-23 DIAGNOSIS — E78.5 HYPERLIPIDEMIA, UNSPECIFIED HYPERLIPIDEMIA TYPE: ICD-10-CM

## 2021-02-23 DIAGNOSIS — E66.3 OVERWEIGHT: ICD-10-CM

## 2021-02-23 DIAGNOSIS — N30.01 ACUTE CYSTITIS WITH HEMATURIA: ICD-10-CM

## 2021-02-23 PROCEDURE — 3066F NEPHROPATHY DOC TX: CPT | Performed by: INTERNAL MEDICINE

## 2021-02-23 PROCEDURE — 3074F SYST BP LT 130 MM HG: CPT | Performed by: INTERNAL MEDICINE

## 2021-02-23 PROCEDURE — 3078F DIAST BP <80 MM HG: CPT | Performed by: INTERNAL MEDICINE

## 2021-02-23 PROCEDURE — 1036F TOBACCO NON-USER: CPT | Performed by: INTERNAL MEDICINE

## 2021-02-23 PROCEDURE — 3008F BODY MASS INDEX DOCD: CPT | Performed by: INTERNAL MEDICINE

## 2021-02-23 PROCEDURE — 99214 OFFICE O/P EST MOD 30 MIN: CPT | Performed by: INTERNAL MEDICINE

## 2021-02-23 PROCEDURE — 4010F ACE/ARB THERAPY RXD/TAKEN: CPT | Performed by: INTERNAL MEDICINE

## 2021-02-23 RX ORDER — ATORVASTATIN CALCIUM 20 MG/1
20 TABLET, FILM COATED ORAL
Qty: 90 TABLET | Refills: 2 | Status: SHIPPED | OUTPATIENT
Start: 2021-02-23 | End: 2021-12-14 | Stop reason: SDUPTHER

## 2021-02-23 RX ORDER — GABAPENTIN 100 MG/1
CAPSULE ORAL
Qty: 90 CAPSULE | Refills: 1 | Status: SHIPPED | OUTPATIENT
Start: 2021-02-23 | End: 2021-05-03 | Stop reason: SDUPTHER

## 2021-02-23 RX ORDER — SUMATRIPTAN 25 MG/1
25 TABLET, FILM COATED ORAL ONCE AS NEEDED
Qty: 30 TABLET | Refills: 2 | Status: SHIPPED | OUTPATIENT
Start: 2021-02-23

## 2021-02-23 RX ORDER — PANTOPRAZOLE SODIUM 40 MG/1
40 TABLET, DELAYED RELEASE ORAL DAILY
Qty: 30 TABLET | Refills: 0 | Status: SHIPPED | OUTPATIENT
Start: 2021-02-23 | End: 2021-09-10

## 2021-02-23 RX ORDER — TOPIRAMATE 50 MG/1
50 TABLET, FILM COATED ORAL 2 TIMES DAILY
Qty: 180 TABLET | Refills: 1 | Status: SHIPPED | OUTPATIENT
Start: 2021-02-23 | End: 2021-09-03 | Stop reason: SDUPTHER

## 2021-02-23 RX ORDER — LISINOPRIL 2.5 MG/1
2.5 TABLET ORAL DAILY
Qty: 90 TABLET | Refills: 2 | Status: SHIPPED | OUTPATIENT
Start: 2021-02-23

## 2021-02-23 RX ORDER — LEVOTHYROXINE SODIUM 0.07 MG/1
75 TABLET ORAL
Qty: 90 TABLET | Refills: 2 | Status: SHIPPED | OUTPATIENT
Start: 2021-02-23 | End: 2021-12-14 | Stop reason: SDUPTHER

## 2021-02-23 RX ORDER — ALBUTEROL SULFATE 90 UG/1
2 AEROSOL, METERED RESPIRATORY (INHALATION) EVERY 6 HOURS PRN
Qty: 1 INHALER | Refills: 5 | Status: SHIPPED | OUTPATIENT
Start: 2021-02-23

## 2021-02-23 RX ORDER — MONTELUKAST SODIUM 10 MG/1
10 TABLET ORAL
Qty: 90 TABLET | Refills: 5 | Status: SHIPPED | OUTPATIENT
Start: 2021-02-23

## 2021-02-23 NOTE — TELEPHONE ENCOUNTER
----- Message from Linsey Uribe sent at 2/23/2021  9:21 AM EST -----  Regarding: mammogram  Mammogram done 11/2020 [facility]

## 2021-02-23 NOTE — TELEPHONE ENCOUNTER
Upon review of the In Basket request we were able to locate, review, and update the patient chart as requested for Mammogram     Any additional questions or concerns should be emailed to the Practice Liaisons via [email or web address] email, please do not reply via In Basket      Thank you  Mackenzie Skaggs
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
## 2021-03-10 DIAGNOSIS — Z23 ENCOUNTER FOR IMMUNIZATION: ICD-10-CM

## 2021-05-03 DIAGNOSIS — E11.29 TYPE 2 DIABETES MELLITUS WITH MICROALBUMINURIA, WITHOUT LONG-TERM CURRENT USE OF INSULIN (HCC): ICD-10-CM

## 2021-05-03 DIAGNOSIS — R80.9 TYPE 2 DIABETES MELLITUS WITH MICROALBUMINURIA, WITHOUT LONG-TERM CURRENT USE OF INSULIN (HCC): ICD-10-CM

## 2021-05-03 DIAGNOSIS — N30.01 ACUTE CYSTITIS WITH HEMATURIA: ICD-10-CM

## 2021-05-03 DIAGNOSIS — J45.20 MILD INTERMITTENT ASTHMA, UNSPECIFIED WHETHER COMPLICATED: ICD-10-CM

## 2021-05-03 DIAGNOSIS — E66.9 OBESITY WITH SERIOUS COMORBIDITY, UNSPECIFIED CLASSIFICATION, UNSPECIFIED OBESITY TYPE: ICD-10-CM

## 2021-05-03 DIAGNOSIS — I10 BENIGN ESSENTIAL HYPERTENSION: ICD-10-CM

## 2021-05-03 DIAGNOSIS — G62.9 PERIPHERAL POLYNEUROPATHY: ICD-10-CM

## 2021-05-03 DIAGNOSIS — N39.3 URINARY, INCONTINENCE, STRESS FEMALE: ICD-10-CM

## 2021-05-03 RX ORDER — GABAPENTIN 100 MG/1
CAPSULE ORAL
Qty: 90 CAPSULE | Refills: 1 | Status: SHIPPED | OUTPATIENT
Start: 2021-05-03 | End: 2021-07-20 | Stop reason: SDUPTHER

## 2021-07-20 DIAGNOSIS — I10 BENIGN ESSENTIAL HYPERTENSION: ICD-10-CM

## 2021-07-20 DIAGNOSIS — N39.3 URINARY, INCONTINENCE, STRESS FEMALE: ICD-10-CM

## 2021-07-20 DIAGNOSIS — N30.01 ACUTE CYSTITIS WITH HEMATURIA: ICD-10-CM

## 2021-07-20 DIAGNOSIS — E11.29 TYPE 2 DIABETES MELLITUS WITH MICROALBUMINURIA, WITHOUT LONG-TERM CURRENT USE OF INSULIN (HCC): ICD-10-CM

## 2021-07-20 DIAGNOSIS — R80.9 TYPE 2 DIABETES MELLITUS WITH MICROALBUMINURIA, WITHOUT LONG-TERM CURRENT USE OF INSULIN (HCC): ICD-10-CM

## 2021-07-20 DIAGNOSIS — G62.9 PERIPHERAL POLYNEUROPATHY: ICD-10-CM

## 2021-07-20 DIAGNOSIS — E66.9 OBESITY WITH SERIOUS COMORBIDITY, UNSPECIFIED CLASSIFICATION, UNSPECIFIED OBESITY TYPE: ICD-10-CM

## 2021-07-20 DIAGNOSIS — J45.20 MILD INTERMITTENT ASTHMA, UNSPECIFIED WHETHER COMPLICATED: ICD-10-CM

## 2021-07-20 RX ORDER — GABAPENTIN 100 MG/1
CAPSULE ORAL
Qty: 90 CAPSULE | Refills: 1 | Status: SHIPPED | OUTPATIENT
Start: 2021-07-20 | End: 2021-09-28 | Stop reason: SDUPTHER

## 2021-09-03 DIAGNOSIS — E11.21 TYPE 2 DIABETES MELLITUS WITH DIABETIC NEPHROPATHY, WITHOUT LONG-TERM CURRENT USE OF INSULIN (HCC): ICD-10-CM

## 2021-09-03 DIAGNOSIS — E78.5 HYPERLIPIDEMIA, UNSPECIFIED HYPERLIPIDEMIA TYPE: ICD-10-CM

## 2021-09-03 DIAGNOSIS — J45.20 MILD INTERMITTENT ASTHMA, UNSPECIFIED WHETHER COMPLICATED: ICD-10-CM

## 2021-09-03 DIAGNOSIS — R80.1 PERSISTENT PROTEINURIA: ICD-10-CM

## 2021-09-03 DIAGNOSIS — I10 ESSENTIAL HYPERTENSION: ICD-10-CM

## 2021-09-03 DIAGNOSIS — G43.009 MIGRAINE WITHOUT AURA AND WITHOUT STATUS MIGRAINOSUS, NOT INTRACTABLE: ICD-10-CM

## 2021-09-03 RX ORDER — TOPIRAMATE 50 MG/1
50 TABLET, FILM COATED ORAL 2 TIMES DAILY
Qty: 180 TABLET | Refills: 1 | Status: SHIPPED | OUTPATIENT
Start: 2021-09-03

## 2021-09-10 ENCOUNTER — OFFICE VISIT (OUTPATIENT)
Dept: INTERNAL MEDICINE CLINIC | Facility: CLINIC | Age: 46
End: 2021-09-10
Payer: COMMERCIAL

## 2021-09-10 VITALS
HEART RATE: 75 BPM | TEMPERATURE: 97.4 F | WEIGHT: 144.6 LBS | BODY MASS INDEX: 25.62 KG/M2 | DIASTOLIC BLOOD PRESSURE: 76 MMHG | SYSTOLIC BLOOD PRESSURE: 112 MMHG | OXYGEN SATURATION: 99 % | HEIGHT: 63 IN

## 2021-09-10 DIAGNOSIS — I10 BENIGN ESSENTIAL HYPERTENSION: ICD-10-CM

## 2021-09-10 DIAGNOSIS — E78.5 HYPERLIPIDEMIA, UNSPECIFIED HYPERLIPIDEMIA TYPE: ICD-10-CM

## 2021-09-10 DIAGNOSIS — R80.9 TYPE 2 DIABETES MELLITUS WITH MICROALBUMINURIA, WITHOUT LONG-TERM CURRENT USE OF INSULIN (HCC): Primary | ICD-10-CM

## 2021-09-10 DIAGNOSIS — I73.9 CLAUDICATION OF BOTH LOWER EXTREMITIES (HCC): ICD-10-CM

## 2021-09-10 DIAGNOSIS — Z11.4 ENCOUNTER FOR SCREENING FOR HIV: ICD-10-CM

## 2021-09-10 DIAGNOSIS — Z80.0 FAMILY HISTORY OF COLON CANCER IN FATHER: ICD-10-CM

## 2021-09-10 DIAGNOSIS — E11.29 TYPE 2 DIABETES MELLITUS WITH MICROALBUMINURIA, WITHOUT LONG-TERM CURRENT USE OF INSULIN (HCC): Primary | ICD-10-CM

## 2021-09-10 PROCEDURE — 99214 OFFICE O/P EST MOD 30 MIN: CPT | Performed by: INTERNAL MEDICINE

## 2021-09-10 PROCEDURE — 3074F SYST BP LT 130 MM HG: CPT | Performed by: INTERNAL MEDICINE

## 2021-09-10 PROCEDURE — 3078F DIAST BP <80 MM HG: CPT | Performed by: INTERNAL MEDICINE

## 2021-09-10 NOTE — PROGRESS NOTES
BMI Counseling: There is no height or weight on file to calculate BMI  The BMI is above normal  Nutrition recommendations include encouraging healthy choices of fruits and vegetables  Exercise recommendations include moderate physical activity 150 minutes/week  Depression Screening and Follow-up Plan: Clincally patient does not have depression  No treatment is required  Assessment/Plan:    Patient is a very pleasant 41 yo female who is here for 6 month f/u visit  She reports doing well  Denies any current complaints  Her HTN is controlled  Continue current regimen  Migraines are controlled- taking Topamax and Imitrex prn  Hypothyroidism continue levothyroxine  She follows with endocrinology for her type 2 DM: [facility]  It is under excellent control  Trulicity and metformin  Her A 1 c is 5 3  she will be scheduling her diabetic eye exam in the next few weeks  She is on ACEI  In regards to preventative screenings;  Mammogram to be done in November 2021  She has a H/O hysterectomy  Strong family h/o Colon cancer in family  Her father was recently treated for colonic mass  Family members with colon cancer at young age  Colonoscopy ordered today- GI referral with Dr Atul Adamson  F/u in 6 months and prn  Labs ordered for today and in 6 months  No problem-specific Assessment & Plan notes found for this encounter  Diagnoses and all orders for this visit:    Type 2 diabetes mellitus with microalbuminuria, without long-term current use of insulin (HCC)  -     CBC and differential; Future  -     Comprehensive metabolic panel; Future  -     CBC and differential; Future  -     Comprehensive metabolic panel; Future    Hyperlipidemia, unspecified hyperlipidemia type  -     Lipid panel; Future  -     Lipid panel;  Future    Benign essential hypertension    Encounter for screening for HIV  -     HIV 1/2 Antigen/Antibody (4th Generation) w Reflex [facility]; Future    Family history of colon cancer in father  -     Ambulatory referral to Gastroenterology; Future    Claudication of both lower extremities (HCC)  -     VAS SHEILA with exercise study; Future          Subjective:      Patient ID: Isacc Ceja is a 40 y o  female  Patient is here for 6 month f/u  The following portions of the patient's history were reviewed and updated as appropriate:   She  has a past medical history of Angina pectoris ([address] ), Anxiety, Atypical chest pain, Diabetes mellitus ([address] ), Disease of thyroid gland, Endometriosis, Hypercalcemia, Hyperlipidemia, Hyponatremia, Metrorrhagia, Microscopic hematuria, Obesity, PONV (postoperative nausea and vomiting), Proteinuria, Shortness of breath, and TIA (transient ischemic attack)    She   Patient Active Problem List    Diagnosis Date Noted    Migraine without aura and without status migrainosus, not intractable 07/20/2020    Lump in neck 07/20/2020    Hyperlipidemia, unspecified 11/20/2019    Multiple benign nevi 11/20/2019    Paresthesia 11/20/2019    Prolapse of female pelvic organs 11/20/2019    Vagina bleeding 11/20/2019    Word finding difficulty 11/20/2019    Hematuria 11/20/2019    Syncope 11/20/2019    Low serum HDL 06/26/2019    Family history of MI (myocardial infarction) 12/11/2018    Persistent proteinuria 05/03/2018    Elevated alkaline phosphatase level 02/27/2018    Aftercare following surgery of the genitourinary system 02/15/2018    Incomplete emptying of bladder 01/08/2018    Menopausal symptoms 12/05/2017    Overactive bladder 12/05/2017    Abdominal pain, chronic, epigastric 11/07/2017    Diabetes mellitus with proteinuria ([address] ) 10/17/2017    Urinary, incontinence, stress female 01/06/2017    Type 2 diabetes mellitus with microalbuminuria ([address] ) 09/12/2016    Headache 08/10/2016    Neuropathy, peripheral 08/10/2016    Abnormal mammogram 08/08/2016    Transient ischemic attack 07/28/2016    Hyperproteinemia 06/24/2016    Dysplastic nevi 06/08/2016    Numbness and tingling in right hand 06/08/2016    Vitamin D deficiency 07/10/2015    Borderline abnormal thyroid function test 02/10/2015    Benign essential hypertension 12/10/2014    Diabetes ([facility] [address] ) 50/04/9827    Nonalcoholic fatty liver disease 09/10/2014    Asthma, mild intermittent 09/03/2014    Obesity 09/03/2014     Current Outpatient Medications   Medication Sig Dispense Refill    ACCU-CHEK FASTCLIX LANCETS MISC TEST 2 TIMES A DAY      albuterol (PROVENTIL HFA,VENTOLIN HFA) 90 mcg/act inhaler Inhale 2 puffs every 6 (six) hours as needed for wheezing 1 Inhaler 5    atorvastatin (LIPITOR) 20 mg tablet Take 1 tablet (20 mg total) by mouth daily at bedtime 90 tablet 2    gabapentin (NEURONTIN) 100 mg capsule TAKE 1 TABLET BY MOUTH AT BEDTIME FOR 1 WEEK, THEN 2 CAPSULES AT 1 WEEK, THEN 3 CAPS THEREAFTER 90 capsule 1    glucagon (GLUCAGON EMERGENCY) 1 MG injection Inject as directed      glucose blood (ACCU-CHEK GLORIA) test strip by In Vitro route 4 (four) times a day      levothyroxine 75 mcg tablet Take 1 tablet (75 mcg total) by mouth daily in the early morning 90 tablet 2    lisinopril (ZESTRIL) 2 5 mg tablet Take 1 tablet (2 5 mg total) by mouth daily 90 tablet 2    metFORMIN (GLUCOPHAGE-XR) 750 mg 24 hr tablet TAKE 1 TABLET BY MOUTH 3 TIMES A DAY AFTER MEALS (Patient taking differently: Take by mouth 2 (two) times a day before breakfast and lunch ) 90 tablet 3    montelukast (SINGULAIR) 10 mg tablet Take 1 tablet (10 mg total) by mouth daily at bedtime 90 tablet 5    phentermine 37 5 MG capsule Take 37 5 mg by mouth every morning      SUMAtriptan (IMITREX) 25 mg tablet Take 1 tablet (25 mg total) by mouth once as needed for migraine for up to 30 doses 30 tablet 2    topiramate (TOPAMAX) 50 MG tablet Take 1 tablet (50 mg total) by mouth 2 (two) times a day 180 tablet 1    TRULICITY 1 5 DD/9 1FX SOPN INJECT SUBCUTANEOUSLY 1 5MG EVERY WEEK  1 No current facility-administered medications for this visit       Review of Systems   Skin: Positive for color change  Cool and purplish extremities   All other systems reviewed and are negative  Objective:      /76 (BP Location: Right arm, Patient Position: Sitting, Cuff Size: Adult)   Pulse 75   Temp (!) 97 4 °F (36 3 °C) (Tympanic)   Ht 5' 3" (1 6 m)   Wt 65 6 kg (144 lb 9 6 oz)   SpO2 99%   BMI 25 61 kg/m²          Physical Exam  Vitals and nursing note reviewed  Constitutional:       Appearance: Normal appearance  HENT:      Head: Normocephalic and atraumatic  Right Ear: Tympanic membrane normal       Left Ear: Tympanic membrane normal    Cardiovascular:      Rate and Rhythm: Normal rate and regular rhythm  Heart sounds: Normal heart sounds  Pulmonary:      Effort: Pulmonary effort is normal       Breath sounds: Normal breath sounds  Abdominal:      General: Bowel sounds are normal       Palpations: Abdomen is soft  Musculoskeletal:         General: Normal range of motion  Cervical back: Normal range of motion  Right lower leg: No edema  Left lower leg: No edema  Lymphadenopathy:      Cervical: No cervical adenopathy  Skin:     General: Skin is cool and dry  Neurological:      General: No focal deficit present  Mental Status: She is alert and oriented to person, place, and time  Mental status is at baseline  Psychiatric:         Mood and Affect: Mood normal          Behavior: Behavior normal          Thought Content:  Thought content normal          Judgment: Judgment normal

## 2021-09-28 DIAGNOSIS — J45.20 MILD INTERMITTENT ASTHMA, UNSPECIFIED WHETHER COMPLICATED: ICD-10-CM

## 2021-09-28 DIAGNOSIS — E11.29 TYPE 2 DIABETES MELLITUS WITH MICROALBUMINURIA, WITHOUT LONG-TERM CURRENT USE OF INSULIN (HCC): ICD-10-CM

## 2021-09-28 DIAGNOSIS — E66.9 OBESITY WITH SERIOUS COMORBIDITY, UNSPECIFIED CLASSIFICATION, UNSPECIFIED OBESITY TYPE: ICD-10-CM

## 2021-09-28 DIAGNOSIS — I10 BENIGN ESSENTIAL HYPERTENSION: ICD-10-CM

## 2021-09-28 DIAGNOSIS — N39.3 URINARY, INCONTINENCE, STRESS FEMALE: ICD-10-CM

## 2021-09-28 DIAGNOSIS — G62.9 PERIPHERAL POLYNEUROPATHY: ICD-10-CM

## 2021-09-28 DIAGNOSIS — R80.9 TYPE 2 DIABETES MELLITUS WITH MICROALBUMINURIA, WITHOUT LONG-TERM CURRENT USE OF INSULIN (HCC): ICD-10-CM

## 2021-09-28 DIAGNOSIS — N30.01 ACUTE CYSTITIS WITH HEMATURIA: ICD-10-CM

## 2021-09-28 RX ORDER — GABAPENTIN 100 MG/1
CAPSULE ORAL
Qty: 90 CAPSULE | Refills: 5 | Status: SHIPPED | OUTPATIENT
Start: 2021-09-28 | End: 2022-04-11 | Stop reason: SDUPTHER

## 2021-09-29 ENCOUNTER — HOSPITAL ENCOUNTER (OUTPATIENT)
Dept: NON INVASIVE DIAGNOSTICS | Facility: HOSPITAL | Age: 46
Discharge: HOME | End: 2021-09-29
Payer: COMMERCIAL

## 2021-09-29 DIAGNOSIS — I73.9 CLAUDICATION OF BOTH LOWER EXTREMITIES (HCC): ICD-10-CM

## 2021-09-29 PROCEDURE — 93923 UPR/LXTR ART STDY 3+ LVLS: CPT | Performed by: SURGERY

## 2021-09-29 PROCEDURE — 93923 UPR/LXTR ART STDY 3+ LVLS: CPT

## 2021-10-01 ENCOUNTER — CLINICAL SUPPORT (OUTPATIENT)
Dept: INTERNAL MEDICINE CLINIC | Facility: CLINIC | Age: 46
End: 2021-10-01
Payer: COMMERCIAL

## 2021-10-01 DIAGNOSIS — Z23 NEED FOR INFLUENZA VACCINATION: Primary | ICD-10-CM

## 2021-10-01 PROCEDURE — 90686 IIV4 VACC NO PRSV 0.5 ML IM: CPT

## 2021-10-01 PROCEDURE — 90471 IMMUNIZATION ADMIN: CPT

## 2021-12-14 DIAGNOSIS — E03.9 HYPOTHYROIDISM, UNSPECIFIED TYPE: ICD-10-CM

## 2021-12-14 DIAGNOSIS — E78.5 HYPERLIPIDEMIA, UNSPECIFIED HYPERLIPIDEMIA TYPE: ICD-10-CM

## 2021-12-14 RX ORDER — LEVOTHYROXINE SODIUM 0.07 MG/1
75 TABLET ORAL
Qty: 90 TABLET | Refills: 2 | Status: SHIPPED | OUTPATIENT
Start: 2021-12-14

## 2021-12-14 RX ORDER — ATORVASTATIN CALCIUM 20 MG/1
20 TABLET, FILM COATED ORAL
Qty: 90 TABLET | Refills: 2 | Status: SHIPPED | OUTPATIENT
Start: 2021-12-14

## 2022-01-19 ENCOUNTER — APPOINTMENT (OUTPATIENT)
Dept: LAB | Facility: CLINIC | Age: 47
End: 2022-01-19
Payer: COMMERCIAL

## 2022-01-19 DIAGNOSIS — E11.29 TYPE 2 DIABETES MELLITUS WITH MICROALBUMINURIA, WITHOUT LONG-TERM CURRENT USE OF INSULIN (HCC): ICD-10-CM

## 2022-01-19 DIAGNOSIS — R80.9 TYPE 2 DIABETES MELLITUS WITH MICROALBUMINURIA, WITHOUT LONG-TERM CURRENT USE OF INSULIN (HCC): ICD-10-CM

## 2022-01-19 DIAGNOSIS — E78.5 HYPERLIPIDEMIA, UNSPECIFIED HYPERLIPIDEMIA TYPE: ICD-10-CM

## 2022-01-19 DIAGNOSIS — Z11.4 ENCOUNTER FOR SCREENING FOR HIV: ICD-10-CM

## 2022-01-19 LAB
ALBUMIN SERPL BCP-MCNC: 4.3 G/DL (ref 3.5–5)
ALP SERPL-CCNC: 134 U/L (ref 46–116)
ALT SERPL W P-5'-P-CCNC: 116 U/L (ref 12–78)
ANION GAP SERPL CALCULATED.3IONS-SCNC: 6 MMOL/L (ref 4–13)
AST SERPL W P-5'-P-CCNC: 33 U/L (ref 5–45)
BASOPHILS # BLD AUTO: 0.05 THOUSANDS/ΜL (ref 0–0.1)
BASOPHILS NFR BLD AUTO: 1 % (ref 0–1)
BILIRUB SERPL-MCNC: 0.54 MG/DL (ref 0.2–1)
BUN SERPL-MCNC: 19 MG/DL (ref 5–25)
CALCIUM SERPL-MCNC: 10.1 MG/DL (ref 8.3–10.1)
CHLORIDE SERPL-SCNC: 109 MMOL/L (ref 100–108)
CHOLEST SERPL-MCNC: 160 MG/DL
CO2 SERPL-SCNC: 24 MMOL/L (ref 21–32)
CREAT SERPL-MCNC: 0.83 MG/DL (ref 0.6–1.3)
EOSINOPHIL # BLD AUTO: 0.17 THOUSAND/ΜL (ref 0–0.61)
EOSINOPHIL NFR BLD AUTO: 2 % (ref 0–6)
ERYTHROCYTE [DISTWIDTH] IN BLOOD BY AUTOMATED COUNT: 12.8 % (ref 11.6–15.1)
GFR SERPL CREATININE-BSD FRML MDRD: 85 ML/MIN/1.73SQ M
GLUCOSE P FAST SERPL-MCNC: 108 MG/DL (ref 65–99)
HCT VFR BLD AUTO: 45.2 % (ref 34.8–46.1)
HDLC SERPL-MCNC: 41 MG/DL
HGB BLD-MCNC: 15.1 G/DL (ref 11.5–15.4)
IMM GRANULOCYTES # BLD AUTO: 0.04 THOUSAND/UL (ref 0–0.2)
IMM GRANULOCYTES NFR BLD AUTO: 1 % (ref 0–2)
LDLC SERPL CALC-MCNC: 106 MG/DL (ref 0–100)
LYMPHOCYTES # BLD AUTO: 2.38 THOUSANDS/ΜL (ref 0.6–4.47)
LYMPHOCYTES NFR BLD AUTO: 34 % (ref 14–44)
MCH RBC QN AUTO: 31.6 PG (ref 26.8–34.3)
MCHC RBC AUTO-ENTMCNC: 33.4 G/DL (ref 31.4–37.4)
MCV RBC AUTO: 95 FL (ref 82–98)
MONOCYTES # BLD AUTO: 0.59 THOUSAND/ΜL (ref 0.17–1.22)
MONOCYTES NFR BLD AUTO: 8 % (ref 4–12)
NEUTROPHILS # BLD AUTO: 3.86 THOUSANDS/ΜL (ref 1.85–7.62)
NEUTS SEG NFR BLD AUTO: 54 % (ref 43–75)
NONHDLC SERPL-MCNC: 119 MG/DL
NRBC BLD AUTO-RTO: 0 /100 WBCS
PLATELET # BLD AUTO: 345 THOUSANDS/UL (ref 149–390)
PMV BLD AUTO: 10.9 FL (ref 8.9–12.7)
POTASSIUM SERPL-SCNC: 3.9 MMOL/L (ref 3.5–5.3)
PROT SERPL-MCNC: 8.4 G/DL (ref 6.4–8.2)
RBC # BLD AUTO: 4.78 MILLION/UL (ref 3.81–5.12)
SODIUM SERPL-SCNC: 139 MMOL/L (ref 136–145)
TRIGL SERPL-MCNC: 66 MG/DL
WBC # BLD AUTO: 7.09 THOUSAND/UL (ref 4.31–10.16)

## 2022-01-19 PROCEDURE — 87389 HIV-1 AG W/HIV-1&-2 AB AG IA: CPT

## 2022-01-19 PROCEDURE — 85025 COMPLETE CBC W/AUTO DIFF WBC: CPT

## 2022-01-19 PROCEDURE — 80053 COMPREHEN METABOLIC PANEL: CPT

## 2022-01-19 PROCEDURE — 80061 LIPID PANEL: CPT

## 2022-01-19 PROCEDURE — 36415 COLL VENOUS BLD VENIPUNCTURE: CPT

## 2022-01-20 LAB — HIV 1+2 AB+HIV1 P24 AG SERPL QL IA: NORMAL

## 2022-02-09 ENCOUNTER — TELEPHONE (OUTPATIENT)
Dept: ADMINISTRATIVE | Facility: OTHER | Age: 47
End: 2022-02-09

## 2022-02-09 NOTE — TELEPHONE ENCOUNTER
----- Message from Mackenzie Cherry sent at 2/8/2022 11:38 AM EST -----  Regarding: mammo  02/08/22 11:38 AM    Hello, our patient Julieta Urbano has had Mammogram completed/performed  Please assist in updating the patient chart by pulling the Care Everywhere (CE) document  The date of service is 11/21       Thank you,  Hammad Gonsalves MA  [facility] [address]

## 2022-03-11 ENCOUNTER — OFFICE VISIT (OUTPATIENT)
Dept: INTERNAL MEDICINE CLINIC | Facility: CLINIC | Age: 47
End: 2022-03-11
Payer: COMMERCIAL

## 2022-03-11 VITALS
RESPIRATION RATE: 18 BRPM | SYSTOLIC BLOOD PRESSURE: 120 MMHG | OXYGEN SATURATION: 98 % | HEIGHT: 63 IN | DIASTOLIC BLOOD PRESSURE: 82 MMHG | WEIGHT: 156.4 LBS | TEMPERATURE: 97.5 F | HEART RATE: 96 BPM | BODY MASS INDEX: 27.71 KG/M2

## 2022-03-11 DIAGNOSIS — R80.9 DIABETES MELLITUS WITH PROTEINURIA (HCC): ICD-10-CM

## 2022-03-11 DIAGNOSIS — Z00.00 ANNUAL PHYSICAL EXAM: Primary | ICD-10-CM

## 2022-03-11 DIAGNOSIS — E11.29 DIABETES MELLITUS WITH PROTEINURIA (HCC): ICD-10-CM

## 2022-03-11 PROCEDURE — 3079F DIAST BP 80-89 MM HG: CPT | Performed by: INTERNAL MEDICINE

## 2022-03-11 PROCEDURE — 3074F SYST BP LT 130 MM HG: CPT | Performed by: INTERNAL MEDICINE

## 2022-03-11 PROCEDURE — 99396 PREV VISIT EST AGE 40-64: CPT | Performed by: INTERNAL MEDICINE

## 2022-03-11 RX ORDER — BUPROPION HYDROCHLORIDE 150 MG/1
150 TABLET ORAL EVERY MORNING
COMMUNITY
Start: 2022-02-01

## 2022-03-11 RX ORDER — METFORMIN HYDROCHLORIDE 750 MG/1
750 TABLET, EXTENDED RELEASE ORAL
Start: 2022-03-11

## 2022-03-11 NOTE — PATIENT INSTRUCTIONS
Wellness Visit for Adults   AMBULATORY CARE:   A wellness visit  is when you see your healthcare provider to get screened for health problems  Your healthcare provider will also give you advice on how to stay healthy  Write down your questions so you remember to ask them  Ask your healthcare provider how often you should have a wellness visit  What happens at a wellness visit:  Your healthcare provider will ask about your health, and your family history of health problems  This includes high blood pressure, heart disease, and cancer  He or she will ask if you have symptoms that concern you, if you smoke, and about your mood  You may also be asked about your intake of medicines, supplements, food, and alcohol  Any of the following may be done:  · Your weight  will be checked  Your height may also be checked so your body mass index (BMI) can be calculated  Your BMI shows if you are at a healthy weight  · Your blood pressure  and heart rate will be checked  Your temperature may also be checked  · Blood and urine tests  may be done  Blood tests may be done to check your cholesterol levels  Abnormal cholesterol levels increase your risk for heart disease and stroke  You may also need a blood or urine test to check for diabetes if you are at increased risk  Urine tests may be done to look for signs of an infection or kidney disease  · A physical exam  includes checking your heartbeat and lungs with a stethoscope  Your healthcare provider may also check your skin to look for sun damage  · Screening tests  may be recommended  A screening test is done to check for diseases that may not cause symptoms  The screening tests you may need depend on your age, gender, family history, and lifestyle habits  For example, colorectal screening may be recommended if you are 48years old or older  Screening tests you need if you are a woman:   · A Pap smear  is used to screen for cervical cancer   Pap smears are usually done every 3 to 5 years depending on your age  You may need them more often if you have had abnormal Pap smear test results in the past  Ask your healthcare provider how often you should have a Pap smear  · A mammogram  is an x-ray of your breasts to screen for breast cancer  Experts recommend mammograms every 2 years starting at age 48 years  You may need a mammogram at age 52 years or younger if you have an increased risk for breast cancer  Talk to your healthcare provider about when you should start having mammograms and how often you need them  Vaccines you may need:   · Get an influenza vaccine  every year  The influenza vaccine protects you from the flu  Several types of viruses cause the flu  The viruses change over time, so new vaccines are made each year  · Get a tetanus-diphtheria (Td) booster vaccine  every 10 years  This vaccine protects you against tetanus and diphtheria  Tetanus is a severe infection that may cause painful muscle spasms and lockjaw  Diphtheria is a severe bacterial infection that causes a thick covering in the back of your mouth and throat  · Get a human papillomavirus (HPV) vaccine  if you are female and aged 23 to 32 or male 23 to 24 and never received it  This vaccine protects you from HPV infection  HPV is the most common infection spread by sexual contact  HPV may also cause vaginal, penile, and anal cancers  · Get a pneumococcal vaccine  if you are aged 72 years or older  The pneumococcal vaccine is an injection given to protect you from pneumococcal disease  Pneumococcal disease is an infection caused by pneumococcal bacteria  The infection may cause pneumonia, meningitis, or an ear infection  · Get a shingles vaccine  if you are 60 or older, even if you have had shingles before  The shingles vaccine is an injection to protect you from the varicella-zoster virus  This is the same virus that causes chickenpox   Shingles is a painful rash that develops in people who had chickenpox or have been exposed to the virus  How to eat healthy:  My Plate is a model for planning healthy meals  It shows the types and amounts of foods that should go on your plate  Fruits and vegetables make up about half of your plate, and grains and protein make up the other half  A serving of dairy is included on the side of your plate  The amount of calories and serving sizes you need depends on your age, gender, weight, and height  Examples of healthy foods are listed below:  · Eat a variety of vegetables  such as dark green, red, and orange vegetables  You can also include canned vegetables low in sodium (salt) and frozen vegetables without added butter or sauces  · Eat a variety of fresh fruits , canned fruit in 100% juice, frozen fruit, and dried fruit  · Include whole grains  At least half of the grains you eat should be whole grains  Examples include whole-wheat bread, wheat pasta, brown rice, and whole-grain cereals such as oatmeal     · Eat a variety of protein foods such as seafood (fish and shellfish), lean meat, and poultry without skin (turkey and chicken)  Examples of lean meats include pork leg, shoulder, or tenderloin, and beef round, sirloin, tenderloin, and extra lean ground beef  Other protein foods include eggs and egg substitutes, beans, peas, soy products, nuts, and seeds  · Choose low-fat dairy products such as skim or 1% milk or low-fat yogurt, cheese, and cottage cheese  · Limit unhealthy fats  such as butter, hard margarine, and shortening  Exercise:  Exercise at least 30 minutes per day on most days of the week  Some examples of exercise include walking, biking, dancing, and swimming  You can also fit in more physical activity by taking the stairs instead of the elevator or parking farther away from stores  Include muscle strengthening activities 2 days each week  Regular exercise provides many health benefits   It helps you manage your weight, and decreases your risk for type 2 diabetes, heart disease, stroke, and high blood pressure  Exercise can also help improve your mood  Ask your healthcare provider about the best exercise plan for you  General health and safety guidelines:   · Do not smoke  Nicotine and other chemicals in cigarettes and cigars can cause lung damage  Ask your healthcare provider for information if you currently smoke and need help to quit  E-cigarettes or smokeless tobacco still contain nicotine  Talk to your healthcare provider before you use these products  · Limit alcohol  A drink of alcohol is 12 ounces of beer, 5 ounces of wine, or 1½ ounces of liquor  · Lose weight, if needed  Being overweight increases your risk of certain health conditions  These include heart disease, high blood pressure, type 2 diabetes, and certain types of cancer  · Protect your skin  Do not sunbathe or use tanning beds  Use sunscreen with a SPF 15 or higher  Apply sunscreen at least 15 minutes before you go outside  Reapply sunscreen every 2 hours  Wear protective clothing, hats, and sunglasses when you are outside  · Drive safely  Always wear your seatbelt  Make sure everyone in your car wears a seatbelt  A seatbelt can save your life if you are in an accident  Do not use your cell phone when you are driving  This could distract you and cause an accident  Pull over if you need to make a call or send a text message  · Practice safe sex  Use latex condoms if are sexually active and have more than one partner  Your healthcare provider may recommend screening tests for sexually transmitted infections (STIs)  · Wear helmets, lifejackets, and protective gear  Always wear a helmet when you ride a bike or motorcycle, go skiing, or play sports that could cause a head injury  Wear protective equipment when you play sports  Wear a lifejacket when you are on a boat or doing water sports      © Copyright [employer] 2022 Information is for End User's use only and may not be sold, redistributed or otherwise used for commercial purposes  All illustrations and images included in CareNotes® are the copyrighted property of [employer]  or Janis Sánchez  The above information is an  only  It is not intended as medical advice for individual conditions or treatments  Talk to your doctor, nurse or pharmacist before following any medical regimen to see if it is safe and effective for you  Weight Management   AMBULATORY CARE:   Why it is important to manage your weight:  Being overweight increases your risk of health conditions such as heart disease, high blood pressure, type 2 diabetes, and certain types of cancer  It can also increase your risk for osteoarthritis, sleep apnea, and other respiratory problems  Aim for a slow, steady weight loss  Even a small amount of weight loss can lower your risk of health problems  Risks of being overweight:  Extra weight can cause many health problems, including the following:  · Diabetes (high blood sugar level)    · High blood pressure or high cholesterol    · Heart disease    · Stroke    · Gallbladder or liver disease    · Cancer of the colon, breast, prostate, liver, or kidney    · Sleep apnea    · Arthritis or gout    Screening  is done to check for health conditions before you have signs or symptoms  If you are 28to 79years old, your blood sugar level may be checked every 3 years for signs of prediabetes or diabetes  Your healthcare provider will check your blood pressure at each visit  High blood pressure can lead to a stroke or other problems  Your provider may check for signs of heart disease, cancer, or other health problems  How to lose weight safely:  A safe and healthy way to lose weight is to eat fewer calories and get regular exercise  · You can lose up about 1 pound a week by decreasing the number of calories you eat by 500 calories each day   You can decrease calories by eating smaller portion sizes or by cutting out high-calorie foods  Read labels to find out how many calories are in the foods you eat  · You can also burn calories with exercise such as walking, swimming, or biking  You will be more likely to keep weight off if you make these changes part of your lifestyle  Exercise at least 30 minutes per day on most days of the week  You can also fit in more physical activity by taking the stairs instead of the elevator or parking farther away from stores  Ask your healthcare provider about the best exercise plan for you  Healthy meal plan for weight management:  A healthy meal plan includes a variety of foods, contains fewer calories, and helps you stay healthy  A healthy meal plan includes the following:     · Eat whole-grain foods more often  A healthy meal plan should contain fiber  Fiber is the part of grains, fruits, and vegetables that is not broken down by your body  Whole-grain foods are healthy and provide extra fiber in your diet  Some examples of whole-grain foods are whole-wheat breads and pastas, oatmeal, brown rice, and bulgur  · Eat a variety of vegetables every day  Include dark, leafy greens such as spinach, kale, annabel greens, and mustard greens  Eat yellow and orange vegetables such as carrots, sweet potatoes, and winter squash  · Eat a variety of fruits every day  Choose fresh or canned fruit (canned in its own juice or light syrup) instead of juice  Fruit juice has very little or no fiber  · Eat low-fat dairy foods  Drink fat-free (skim) milk or 1% milk  Eat fat-free yogurt and low-fat cottage cheese  Try low-fat cheeses such as mozzarella and other reduced-fat cheeses  · Choose meat and other protein foods that are low in fat  Choose beans or other legumes such as split peas or lentils  Choose fish, skinless poultry (chicken or turkey), or lean cuts of red meat (beef or pork)   Before you cook meat or poultry, cut off any visible fat  · Use less fat and oil  Try baking foods instead of frying them  Add less fat, such as margarine, sour cream, regular salad dressing and mayonnaise to foods  Eat fewer high-fat foods  Some examples of high-fat foods include french fries, doughnuts, ice cream, and cakes  · Eat fewer sweets  Limit foods and drinks that are high in sugar  This includes candy, cookies, regular soda, and sweetened drinks  Ways to decrease calories:   · Eat smaller portions  ? Use a small plate with smaller servings  ? Do not eat second helpings  ? When you eat at a restaurant, ask for a box and place half of your meal in the box before you eat  ? Share an entrée with someone else  · Replace high-calorie snacks with healthy, low-calorie snacks  ? Choose fresh fruit, vegetables, fat-free rice cakes, or air-popped popcorn instead of potato chips, nuts, or chocolate  ? Choose water or calorie-free drinks instead of soda or sweetened drinks  · Do not shop for groceries when you are hungry  You may be more likely to make unhealthy food choices  Take a grocery list of healthy foods and shop after you have eaten  · Eat regular meals  Do not skip meals  Skipping meals can lead to overeating later in the day  This can make it harder for you to lose weight  Eat a healthy snack in place of a meal if you do not have time to eat a regular meal  Talk with a dietitian to help you create a meal plan and schedule that is right for you  Other things to consider as you try to lose weight:   · Be aware of situations that may give you the urge to overeat, such as eating while watching television  Find ways to avoid these situations  For example, read a book, go for a walk, or do crafts  · Meet with a weight loss support group or friends who are also trying to lose weight  This may help you stay motivated to continue working on your weight loss goals      © Copyright [employer] 2022 Information is for End User's use only and may not be sold, redistributed or otherwise used for commercial purposes  All illustrations and images included in CareNotes® are the copyrighted property of [employer]  or Janis Sánchez  The above information is an  only  It is not intended as medical advice for individual conditions or treatments  Talk to your doctor, nurse or pharmacist before following any medical regimen to see if it is safe and effective for you  Cholesterol and Your Health   AMBULATORY CARE:   Cholesterol  is a waxy, fat-like substance  Your body uses cholesterol to make hormones and new cells, and to protect nerves  Cholesterol is made by your body  It also comes from certain foods you eat, such as meat and dairy products  Your healthcare provider can help you set goals for your cholesterol levels  He or she can help you create a plan to meet your goals  Cholesterol level goals: Your cholesterol level goals depend on your risk for heart disease, your age, and your other health conditions  The following are general guidelines:  · Total cholesterol  includes low-density lipoprotein (LDL), high-density lipoprotein (HDL), and triglyceride levels  The total cholesterol level should be lower than 200 mg/dL and is best at about 150 mg/dL  · LDL cholesterol  is called bad cholesterol  because it forms plaque in your arteries  As plaque builds up, your arteries become narrow, and less blood flows through  When plaque decreases blood flow to your heart, you may have chest pain  If plaque completely blocks an artery that brings blood to your heart, you may have a heart attack  Plaque can break off and form blood clots  Blood clots may block arteries in your brain and cause a stroke  The level should be less than 130 mg/dL and is best at about 100 mg/dL  · HDL cholesterol  is called good cholesterol  because it helps remove LDL cholesterol from your arteries   It does this by attaching to LDL cholesterol and carrying it to your liver  Your liver breaks down LDL cholesterol so your body can get rid of it  High levels of HDL cholesterol can help prevent a heart attack and stroke  Low levels of HDL cholesterol can increase your risk for heart disease, heart attack, and stroke  The level should be 60 mg/dL or higher  · Triglycerides  are a type of fat that store energy from foods you eat  High levels of triglycerides also cause plaque buildup  This can increase your risk for a heart attack or stroke  If your triglyceride level is high, your LDL cholesterol level may also be high  The level should be less than 150 mg/dL  Any of the following can increase your risk for high cholesterol:   · Smoking cigarettes    · Being overweight or obese, or not getting enough exercise    · Drinking large amounts of alcohol    · A medical condition such as hypertension (high blood pressure) or diabetes    · Certain genes passed from your parents to you    · Age older than 65 years    What you need to know about having your cholesterol levels checked: Adults 21to 39years of age should have their cholesterol levels checked every 4 to 6 years  Adults 45 years or older should have their cholesterol checked every 1 to 2 years  You may need your cholesterol checked more often, or at a younger age, if you have risk factors for heart disease  You may also need to have your cholesterol checked more often if you have other health conditions, such as diabetes  Blood tests are used to check cholesterol levels  Blood tests measure your levels of triglycerides, LDL cholesterol, and HDL cholesterol  How healthy fats affect your cholesterol levels:  Healthy fats, also called unsaturated fats, help lower LDL cholesterol and triglyceride levels  Healthy fats include the following:  · Monounsaturated fats  are found in foods such as olive oil, canola oil, avocado, nuts, and olives      · Polyunsaturated fats,  such as omega 3 fats, are found in fish, such as salmon, trout, and tuna  They can also be found in plant foods such as flaxseed, walnuts, and soybeans  How unhealthy fats affect your cholesterol levels:  Unhealthy fats increase LDL cholesterol and triglyceride levels  They are found in foods high in cholesterol, saturated fat, and trans fat:  · Cholesterol  is found in eggs, dairy, and meat  · Saturated fat  is found in butter, cheese, ice cream, whole milk, and coconut oil  Saturated fat is also found in meat, such as sausage, hot dogs, and bologna  · Trans fat  is found in liquid oils and is used in fried and baked foods  Foods that contain trans fats include chips, crackers, muffins, sweet rolls, microwave popcorn, and cookies  Treatment  for high cholesterol will also decrease your risk of heart disease, heart attack, and stroke  Treatment may include any of the following:  · Lifestyle changes  may include food, exercise, weight loss, and quitting smoking  You may also need to decrease the amount of alcohol you drink  Your healthcare provider will want you to start with lifestyle changes  Other treatment may be added if lifestyle changes are not enough  Your healthcare provider may recommend you work with a team to manage hyperlipidemia  The team may include medical experts such as a dietitian, an exercise or physical therapist, and a behavior therapist  Your family members may be included in helping you create lifestyle changes  · Medicines  may be given to lower your LDL cholesterol, triglyceride levels, or total cholesterol level  You may need medicines to lower your cholesterol if any of the following is true:    ? You have a history of stroke, TIA, unstable angina, or a heart attack  ? Your LDL cholesterol level is 190 mg/dL or higher  ? You are age 36 to 76 years, have diabetes or heart disease risk factors, and your LDL cholesterol is 70 mg/dL or higher      · Supplements  include fish oil, red yeast rice, and garlic  Fish oil may help lower your triglyceride and LDL cholesterol levels  It may also increase your HDL cholesterol level  Red yeast rice may help decrease your total cholesterol level and LDL cholesterol level  Garlic may help lower your total cholesterol level  Do not take any supplements without talking to your healthcare provider  Food changes you can make to lower your cholesterol levels:  A dietitian can help you create a healthy eating plan  He or she can show you how to read food labels and choose foods low in saturated fat, trans fats, and cholesterol  · Decrease the total amount of fat you eat  Choose lean meats, fat-free or 1% fat milk, and low-fat dairy products, such as yogurt and cheese  Try to limit or avoid red meats  Limit or do not eat fried foods or baked goods, such as cookies  · Replace unhealthy fats with healthy fats  Cook foods in olive oil or canola oil  Choose soft margarines that are low in saturated fat and trans fat  Seeds, nuts, and avocados are other examples of healthy fats  · Eat foods with omega-3 fats  Examples include salmon, tuna, mackerel, walnuts, and flaxseed  Eat fish 2 times per week  Pregnant women should not eat fish that have high levels of mercury, such as shark, swordfish, and marisa mackerel  · Increase the amount of high-fiber foods you eat  High-fiber foods can help lower your LDL cholesterol  Aim to get between 20 and 30 grams of fiber each day  Fruits and vegetables are high in fiber  Eat at least 5 servings each day  Other high-fiber foods are whole-grain or whole-wheat breads, pastas, or cereals, and brown rice  Eat 3 ounces of whole-grain foods each day  Increase fiber slowly  You may have abdominal discomfort, bloating, and gas if you add fiber to your diet too quickly  · Eat healthy protein foods  Examples include low-fat dairy products, skinless chicken and turkey, fish, and nuts      · Limit foods and drinks that are high in sugar  Your dietitian or healthcare provider can help you create daily limits for high-sugar foods and drinks  The limit may be lower if you have diabetes or another health condition  Limits can also help you lose weight if needed  Lifestyle changes you can make to lower your cholesterol levels:   · Maintain a healthy weight  Ask your healthcare provider what a healthy weight is for you  Ask him or her to help you create a weight loss plan if needed  Weight loss can decrease your total cholesterol and triglyceride levels  Weight loss may also help keep your blood pressure at a healthy level  · Be physically active throughout the day  Physical activity, such as exercise, can help lower your total cholesterol level and maintain a healthy weight  Physical activity can also help increase your HDL cholesterol level  Work with your healthcare provider to create an program that is right for you  Get at least 30 to 40 minutes of moderate physical activity most days of the week  Examples of exercise include brisk walking, swimming, or biking  Also include strength training at least 2 times each week  Your healthcare providers can help you create a physical activity plan  · Do not smoke  Nicotine and other chemicals in cigarettes and cigars can raise your cholesterol levels  Ask your healthcare provider for information if you currently smoke and need help to quit  E-cigarettes or smokeless tobacco still contain nicotine  Talk to your healthcare provider before you use these products  · Limit or do not drink alcohol  Alcohol can increase your triglyceride levels  Ask your healthcare provider before you drink alcohol  Ask how much is okay for you to drink in 24 hours or 1 week  Follow up with your doctor as directed:  Write down your questions so you remember to ask them during your visits    © Copyright [employer] 2022 Information is for End User's use only and may not be sold, redistributed or otherwise used for commercial purposes  All illustrations and images included in CareNotes® are the copyrighted property of [employer]  or Janis Sánchez  The above information is an  only  It is not intended as medical advice for individual conditions or treatments  Talk to your doctor, nurse or pharmacist before following any medical regimen to see if it is safe and effective for you

## 2022-03-11 NOTE — PROGRESS NOTES
ADULT ANNUAL [address] [facility]    NAME: Nirmal Regalado  AGE: 40 y o  SEX: female  : 1975     DATE: 3/11/2022     Assessment and Plan:     Problem List Items Addressed This Visit        Endocrine    Diabetes mellitus with proteinuria ([address] )    Relevant Medications    metFORMIN (GLUCOPHAGE-XR) 750 mg 24 hr tablet      Other Visit Diagnoses     Annual physical exam    -  Primary          Immunizations and preventive care screenings were discussed with patient today  Appropriate education was printed on patient's after visit summary  Counseling:  Alcohol/drug use: discussed moderation in alcohol intake, the recommendations for healthy alcohol use, and avoidance of illicit drug use  Dental Health: discussed importance of regular tooth brushing, flossing, and dental visits  Injury prevention: discussed safety/seat belts, safety helmets, smoke detectors, carbon dioxide detectors, and smoking near bedding or upholstery  Sexual health: discussed sexually transmitted diseases, partner selection, use of condoms, avoidance of unintended pregnancy, and contraceptive alternatives  · Exercise: the importance of regular exercise/physical activity was discussed  Recommend exercise 3-5 times per week for at least 30 minutes  No follow-ups on file  Chief Complaint:     Chief Complaint   Patient presents with    Annual Exam      History of Present Illness:     Adult Annual Physical   Patient here for a comprehensive physical exam  The patient reports no problems  Diet and Physical Activity  · Diet/Nutrition: well balanced diet and limited junk food  · Exercise: walking        Depression Screening  PHQ-2/9 Depression Screening    Little interest or pleasure in doing things: 0 - not at all  Feeling down, depressed, or hopeless: 0 - not at all  PHQ-2 Score: 0  PHQ-2 Interpretation: Negative depression screen       General Health  · Sleep: sleeps poorly  Reports hot flashes  · Hearing: normal - bilateral   · Vision: wears glasses  · Dental: regular dental visits  /GYN Health  · Patient is: perimenopausal  · Last menstrual period: h/o hysterectomy  · Contraceptive method: patient is currently practicing abstinence        Review of Systems:     Review of Systems   Endocrine: Positive for heat intolerance  All other systems reviewed and are negative  Past Medical History:     Past Medical History:   Diagnosis Date    Angina pectoris ([facility] )     Anxiety     Atypical chest pain     Last Assessed: 9/3/2014    Diabetes mellitus ([facility] )     Disease of thyroid gland     Endometriosis     Last Assessed:9/13/2016     Hypercalcemia     Last Assessed: 4/27/2017    Hyperlipidemia     Hyponatremia     Last assessed: 8/17/2016    Metrorrhagia     Last Assessed: 9/3/2014    Microscopic hematuria     Last Assessed: 9/14/2016    Obesity     PONV (postoperative nausea and vomiting)     Proteinuria     Shortness of breath     TIA (transient ischemic attack)       Past Surgical History:     Past Surgical History:   Procedure Laterality Date    HYSTERECTOMY      Resolved: 12/2013    LAPAROSCOPIC CHOLECYSTECTOMY      Last Assessed: 10/17/2017    OOPHORECTOMY      Last Assessed: 7/6/2016    OVARIAN CYST REMOVAL Right     MT CYSTOURETHROSCOPY,FULGUR <0 5 CM CHUYITA N/A 3/23/2017    Procedure: Aliya Wadsworth; BLADDER BIOPSY; [place] Maikol ;  Surgeon: River Bernard MD;  Location: [facility];  Service: Urology      Social History:     Social History     Socioeconomic History    Marital status:      Spouse name: None    Number of children: None    Years of education: None    Highest education level: None   Occupational History    None   Tobacco Use    Smoking status: Never Smoker    Smokeless tobacco: Never Used   Substance and Sexual Activity    Alcohol use: No     Comment: social use per allscripts    Drug use:  No  Sexual activity: Not Currently   Other Topics Concern    None   Social History Narrative    Caffeine use     Social Determinants of Health     Financial Resource Strain: Not on file   Food Insecurity: Not on file   Transportation Needs: Not on file   Physical Activity: Not on file   Stress: Not on file   Social Connections: Not on file   Intimate Partner Violence: Not on file   Housing Stability: Not on file      Family History:     Family History   Problem Relation Age of Onset    Diabetes Mother     Hyperlipidemia Mother     Hypertension Mother     Heart disease Mother     Anxiety disorder Mother     Arthritis Mother     Depression Mother     Heart attack Mother     Hyperlipidemia Father     Hypertension Father     Alcohol abuse Father     Diabetes Sister     Asthma Sister     Melanoma Sister     Breast cancer Sister     Colon cancer Maternal Grandmother     Colon cancer Paternal Grandmother     Depression Child     Anxiety disorder Daughter     Depression Son     Arthritis Maternal Aunt     Depression Maternal Aunt     Diabetes Maternal Aunt     Hypertension Maternal Aunt     Breast cancer Maternal Aunt     Arthritis Paternal Aunt     Diabetes Paternal Aunt     Hypertension Paternal Aunt     Breast cancer Paternal Aunt     Diabetes Paternal Uncle     Arthritis Cousin     Hypertension Cousin     Substance Abuse Cousin     Diabetes Maternal Uncle       Current Medications:     Current Outpatient Medications   Medication Sig Dispense Refill    ACCU-CHEK FASTCLIX LANCETS MISC TEST 2 TIMES A DAY      albuterol (PROVENTIL HFA,VENTOLIN HFA) 90 mcg/act inhaler Inhale 2 puffs every 6 (six) hours as needed for wheezing 1 Inhaler 5    atorvastatin (LIPITOR) 20 mg tablet Take 1 tablet (20 mg total) by mouth daily at bedtime 90 tablet 2    buPROPion (WELLBUTRIN XL) 150 mg 24 hr tablet Take 150 mg by mouth every morning      gabapentin (NEURONTIN) 100 mg capsule TAKE 1 TABLET BY MOUTH AT BEDTIME FOR 1 WEEK, THEN 2 CAPSULES AT 1 WEEK, THEN 3 CAPS THEREAFTER 90 capsule 5    glucagon (GLUCAGON EMERGENCY) 1 MG injection Inject as directed      glucose blood (ACCU-CHEK GLORIA) test strip by In Vitro route 4 (four) times a day      levothyroxine 75 mcg tablet Take 1 tablet (75 mcg total) by mouth daily in the early morning 90 tablet 2    lisinopril (ZESTRIL) 2 5 mg tablet Take 1 tablet (2 5 mg total) by mouth daily 90 tablet 2    metFORMIN (GLUCOPHAGE-XR) 750 mg 24 hr tablet Take 1 tablet (750 mg total) by mouth 2 (two) times a day before breakfast and lunch      montelukast (SINGULAIR) 10 mg tablet Take 1 tablet (10 mg total) by mouth daily at bedtime 90 tablet 5    phentermine 37 5 MG capsule Take 37 5 mg by mouth every morning      SUMAtriptan (IMITREX) 25 mg tablet Take 1 tablet (25 mg total) by mouth once as needed for migraine for up to 30 doses 30 tablet 2    topiramate (TOPAMAX) 50 MG tablet Take 1 tablet (50 mg total) by mouth 2 (two) times a day 180 tablet 1    TRULICITY 1 5 CA/2 7HX SOPN INJECT SUBCUTANEOUSLY 1 5MG EVERY WEEK  1     No current facility-administered medications for this visit  Allergies: Allergies   Allergen Reactions    Cephalosporins Anaphylaxis    Norflex [Orphenadrine] Anaphylaxis    Rocephin [Ceftriaxone] Anaphylaxis    Sulfa Antibiotics Hives    Ultracet [Tramadol-Acetaminophen] Hives      Physical Exam:     /82 (BP Location: Left arm, Patient Position: Sitting, Cuff Size: Adult)   Pulse 96   Temp 97 5 °F (36 4 °C) (Tympanic)   Resp 18   Ht 5' 3" (1 6 m)   Wt 70 9 kg (156 lb 6 4 oz)   SpO2 98%   BMI 27 71 kg/m²     Physical Exam  Vitals and nursing note reviewed  Constitutional:       Appearance: Normal appearance  HENT:      Head: Normocephalic and atraumatic        Right Ear: Tympanic membrane and ear canal normal       Left Ear: Tympanic membrane normal       Mouth/Throat:      Mouth: Mucous membranes are moist  Pharynx: Oropharynx is clear  Cardiovascular:      Rate and Rhythm: Normal rate and regular rhythm  Heart sounds: Normal heart sounds  Pulmonary:      Effort: Pulmonary effort is normal       Breath sounds: Normal breath sounds  Abdominal:      Palpations: Abdomen is soft  Musculoskeletal:         General: Normal range of motion  Cervical back: Normal range of motion  Right lower leg: No edema  Left lower leg: No edema  Skin:     General: Skin is warm and dry  Neurological:      General: No focal deficit present  Mental Status: She is alert and oriented to person, place, and time  Mental status is at baseline     Psychiatric:         Mood and Affect: Mood normal           Fide Houston MD  [facility] INTERNAL MEDICINE [place] Ibarra

## 2022-04-08 ENCOUNTER — TELEPHONE (OUTPATIENT)
Dept: INTERNAL MEDICINE CLINIC | Facility: CLINIC | Age: 47
End: 2022-04-08

## 2022-04-08 NOTE — TELEPHONE ENCOUNTER
Patient said that you originally put her on Gabapentin 1 tablet-then you upped it to 2 a day and for the last 8 months she has been taking 3 tablets per day  Now the Pharmacy will only give her 30 tablets a month due to the way the script is written  Patient is asking if you could redo the script and send to her pharmacy  Please advise    Roel Shepherd If patient cannot answer her phone a message can be left

## 2022-04-08 NOTE — TELEPHONE ENCOUNTER
As per Dr Vernon Hassan, "Can we see how many mg's she is taking"    Called pt and lmom asking for a call back to clarify what mg she is taking of the medication

## 2022-04-11 NOTE — TELEPHONE ENCOUNTER
Patient is taking three (3) 100 mg capsules at bedtime  She needs a refill because the dosage had been changed  She needs a new script sent       [facility]    Call back #[phone]

## 2022-05-09 ENCOUNTER — APPOINTMENT (OUTPATIENT)
Dept: LAB | Facility: CLINIC | Age: 47
End: 2022-05-09
Payer: COMMERCIAL

## 2022-05-09 DIAGNOSIS — R73.01 ABNORMAL FASTING GLUCOSE: ICD-10-CM

## 2022-05-09 DIAGNOSIS — R94.5 ABNORMAL LIVER FUNCTION: ICD-10-CM

## 2022-05-09 DIAGNOSIS — E78.5 HYPERLIPIDEMIA, UNSPECIFIED HYPERLIPIDEMIA TYPE: ICD-10-CM

## 2022-05-09 DIAGNOSIS — R80.9 TYPE 2 DIABETES MELLITUS WITH MICROALBUMINURIA, WITHOUT LONG-TERM CURRENT USE OF INSULIN (HCC): ICD-10-CM

## 2022-05-09 DIAGNOSIS — E11.29 TYPE 2 DIABETES MELLITUS WITH MICROALBUMINURIA, WITHOUT LONG-TERM CURRENT USE OF INSULIN (HCC): ICD-10-CM

## 2022-05-09 LAB
ALBUMIN SERPL BCP-MCNC: 3.9 G/DL (ref 3.5–5)
ALP SERPL-CCNC: 117 U/L (ref 46–116)
ALT SERPL W P-5'-P-CCNC: 35 U/L (ref 12–78)
ANION GAP SERPL CALCULATED.3IONS-SCNC: 7 MMOL/L (ref 4–13)
AST SERPL W P-5'-P-CCNC: 16 U/L (ref 5–45)
BASOPHILS # BLD AUTO: 0.05 THOUSANDS/ΜL (ref 0–0.1)
BASOPHILS NFR BLD AUTO: 1 % (ref 0–1)
BILIRUB DIRECT SERPL-MCNC: 0.11 MG/DL (ref 0–0.2)
BILIRUB SERPL-MCNC: 0.42 MG/DL (ref 0.2–1)
BUN SERPL-MCNC: 13 MG/DL (ref 5–25)
CALCIUM SERPL-MCNC: 9.8 MG/DL (ref 8.3–10.1)
CHLORIDE SERPL-SCNC: 109 MMOL/L (ref 100–108)
CHOLEST SERPL-MCNC: 107 MG/DL
CO2 SERPL-SCNC: 24 MMOL/L (ref 21–32)
CREAT SERPL-MCNC: 0.9 MG/DL (ref 0.6–1.3)
EOSINOPHIL # BLD AUTO: 0.12 THOUSAND/ΜL (ref 0–0.61)
EOSINOPHIL NFR BLD AUTO: 2 % (ref 0–6)
ERYTHROCYTE [DISTWIDTH] IN BLOOD BY AUTOMATED COUNT: 13.8 % (ref 11.6–15.1)
EST. AVERAGE GLUCOSE BLD GHB EST-MCNC: 114 MG/DL
GFR SERPL CREATININE-BSD FRML MDRD: 77 ML/MIN/1.73SQ M
GLUCOSE P FAST SERPL-MCNC: 102 MG/DL (ref 65–99)
HBA1C MFR BLD: 5.6 %
HCT VFR BLD AUTO: 44 % (ref 34.8–46.1)
HDLC SERPL-MCNC: 42 MG/DL
HGB BLD-MCNC: 14.3 G/DL (ref 11.5–15.4)
IMM GRANULOCYTES # BLD AUTO: 0.02 THOUSAND/UL (ref 0–0.2)
IMM GRANULOCYTES NFR BLD AUTO: 0 % (ref 0–2)
LDLC SERPL CALC-MCNC: 53 MG/DL (ref 0–100)
LYMPHOCYTES # BLD AUTO: 2.15 THOUSANDS/ΜL (ref 0.6–4.47)
LYMPHOCYTES NFR BLD AUTO: 29 % (ref 14–44)
MCH RBC QN AUTO: 31.2 PG (ref 26.8–34.3)
MCHC RBC AUTO-ENTMCNC: 32.5 G/DL (ref 31.4–37.4)
MCV RBC AUTO: 96 FL (ref 82–98)
MONOCYTES # BLD AUTO: 0.52 THOUSAND/ΜL (ref 0.17–1.22)
MONOCYTES NFR BLD AUTO: 7 % (ref 4–12)
NEUTROPHILS # BLD AUTO: 4.51 THOUSANDS/ΜL (ref 1.85–7.62)
NEUTS SEG NFR BLD AUTO: 61 % (ref 43–75)
NONHDLC SERPL-MCNC: 65 MG/DL
NRBC BLD AUTO-RTO: 0 /100 WBCS
PLATELET # BLD AUTO: 341 THOUSANDS/UL (ref 149–390)
PMV BLD AUTO: 11.2 FL (ref 8.9–12.7)
POTASSIUM SERPL-SCNC: 3.8 MMOL/L (ref 3.5–5.3)
PROT SERPL-MCNC: 7.6 G/DL (ref 6.4–8.2)
RBC # BLD AUTO: 4.59 MILLION/UL (ref 3.81–5.12)
SODIUM SERPL-SCNC: 140 MMOL/L (ref 136–145)
TRIGL SERPL-MCNC: 61 MG/DL
WBC # BLD AUTO: 7.37 THOUSAND/UL (ref 4.31–10.16)

## 2022-05-09 PROCEDURE — 80053 COMPREHEN METABOLIC PANEL: CPT

## 2022-05-09 PROCEDURE — 80061 LIPID PANEL: CPT

## 2022-05-09 PROCEDURE — 82248 BILIRUBIN DIRECT: CPT

## 2022-05-09 PROCEDURE — 85025 COMPLETE CBC W/AUTO DIFF WBC: CPT

## 2022-05-09 PROCEDURE — 36415 COLL VENOUS BLD VENIPUNCTURE: CPT

## 2022-05-09 PROCEDURE — 83036 HEMOGLOBIN GLYCOSYLATED A1C: CPT

## 2022-09-09 DIAGNOSIS — E03.9 HYPOTHYROIDISM, UNSPECIFIED TYPE: ICD-10-CM

## 2022-09-09 DIAGNOSIS — E78.5 HYPERLIPIDEMIA, UNSPECIFIED HYPERLIPIDEMIA TYPE: ICD-10-CM

## 2022-09-09 RX ORDER — ATORVASTATIN CALCIUM 20 MG/1
20 TABLET, FILM COATED ORAL
Qty: 90 TABLET | Refills: 2 | Status: SHIPPED | OUTPATIENT
Start: 2022-09-09

## 2022-09-09 RX ORDER — LEVOTHYROXINE SODIUM 0.07 MG/1
75 TABLET ORAL
Qty: 90 TABLET | Refills: 2 | Status: SHIPPED | OUTPATIENT
Start: 2022-09-09

## 2022-09-14 ENCOUNTER — OFFICE VISIT (OUTPATIENT)
Dept: INTERNAL MEDICINE CLINIC | Facility: CLINIC | Age: 47
End: 2022-09-14
Payer: COMMERCIAL

## 2022-09-14 VITALS
HEART RATE: 78 BPM | WEIGHT: 154 LBS | BODY MASS INDEX: 27.29 KG/M2 | RESPIRATION RATE: 12 BRPM | SYSTOLIC BLOOD PRESSURE: 116 MMHG | DIASTOLIC BLOOD PRESSURE: 78 MMHG | HEIGHT: 63 IN | OXYGEN SATURATION: 100 %

## 2022-09-14 DIAGNOSIS — I10 BENIGN ESSENTIAL HYPERTENSION: ICD-10-CM

## 2022-09-14 DIAGNOSIS — G43.009 MIGRAINE WITHOUT AURA AND WITHOUT STATUS MIGRAINOSUS, NOT INTRACTABLE: ICD-10-CM

## 2022-09-14 DIAGNOSIS — E11.21 TYPE 2 DIABETES MELLITUS WITH DIABETIC NEPHROPATHY, WITHOUT LONG-TERM CURRENT USE OF INSULIN (HCC): ICD-10-CM

## 2022-09-14 DIAGNOSIS — L72.11 PILAR CYST: ICD-10-CM

## 2022-09-14 DIAGNOSIS — Z80.0 FAMILY HISTORY OF COLON CANCER IN FATHER: Primary | ICD-10-CM

## 2022-09-14 DIAGNOSIS — F32.A DEPRESSION, UNSPECIFIED DEPRESSION TYPE: ICD-10-CM

## 2022-09-14 DIAGNOSIS — J45.909 UNCOMPLICATED ASTHMA, UNSPECIFIED ASTHMA SEVERITY, UNSPECIFIED WHETHER PERSISTENT: ICD-10-CM

## 2022-09-14 DIAGNOSIS — Z12.11 COLON CANCER SCREENING: ICD-10-CM

## 2022-09-14 DIAGNOSIS — E78.5 HYPERLIPIDEMIA, UNSPECIFIED HYPERLIPIDEMIA TYPE: ICD-10-CM

## 2022-09-14 PROCEDURE — 99214 OFFICE O/P EST MOD 30 MIN: CPT | Performed by: INTERNAL MEDICINE

## 2022-09-14 RX ORDER — MONTELUKAST SODIUM 10 MG/1
10 TABLET ORAL
Qty: 90 TABLET | Refills: 1 | Status: SHIPPED | OUTPATIENT
Start: 2022-09-14

## 2022-09-14 NOTE — PROGRESS NOTES
Assessment/Plan:      patient is here for routine follow-up  She is very emotional today  We discussed her ongoing issues with her daughter  She would like to see a behavioral therapist   Order placed today  Labs are excellent  She continues follow-up with her endocrinologist   She needs a colonoscopy  Extensive family history of colon cancer  Order placed today  Quality Measures:     BMI Counseling: There is no height or weight on file to calculate BMI  The BMI is above normal  Nutrition recommendations include decreasing portion sizes and encouraging healthy choices of fruits and vegetables  Exercise recommendations include moderate physical activity 150 minutes/week  Rationale for BMI follow-up plan is due to patient being overweight or obese  Depression Screening and Follow-up Plan: Clincally patient does not have depression  No treatment is required  Return in about 2 months (around 11/14/2022)  No problem-specific Assessment & Plan notes found for this encounter  Diagnoses and all orders for this visit:    Family history of colon cancer in father  -     Ambulatory referral for colonoscopy; Future    Uncomplicated asthma, unspecified asthma severity, unspecified whether persistent  -     montelukast (SINGULAIR) 10 mg tablet; Take 1 tablet (10 mg total) by mouth daily at bedtime    Colon cancer screening  -     Ambulatory referral for colonoscopy; Future    Depression, unspecified depression type  -     Ambulatory Referral to [facility]; Future    Pilar cyst  -     Ambulatory Referral to General Surgery; Future    Type 2 diabetes mellitus with diabetic nephropathy, without long-term current use of insulin (HCC)    Hyperlipidemia, unspecified hyperlipidemia type    Benign essential hypertension    Migraine without aura and without status migrainosus, not intractable          Subjective:      Patient ID: Julieta Urbano is a 56 y o  female      Here for routine follow up        ALLERGIES:  Allergies   Allergen Reactions    Cephalosporins Anaphylaxis    Norflex [Orphenadrine] Anaphylaxis    Rocephin [Ceftriaxone] Anaphylaxis    Sulfa Antibiotics Hives    Ultracet [Tramadol-Acetaminophen] Hives       CURRENT MEDICATIONS:    Current Outpatient Medications:     ACCU-CHEK FASTCLIX LANCETS MISC, TEST 2 TIMES A DAY, Disp: , Rfl:     albuterol (PROVENTIL HFA,VENTOLIN HFA) 90 mcg/act inhaler, Inhale 2 puffs every 6 (six) hours as needed for wheezing, Disp: 1 Inhaler, Rfl: 5    atorvastatin (LIPITOR) 20 mg tablet, Take 1 tablet (20 mg total) by mouth daily at bedtime, Disp: 90 tablet, Rfl: 2    buPROPion (WELLBUTRIN XL) 150 mg 24 hr tablet, Take 150 mg by mouth every morning, Disp: , Rfl:     gabapentin (NEURONTIN) 100 mg capsule, Take 3 capsules (300 mg total) by mouth daily at bedtime, Disp: 270 capsule, Rfl: 5    glucagon 1 MG injection, Inject as directed, Disp: , Rfl:     glucose blood test strip, by In Vitro route 4 (four) times a day, Disp: , Rfl:     levothyroxine 75 mcg tablet, Take 1 tablet (75 mcg total) by mouth daily in the early morning, Disp: 90 tablet, Rfl: 2    lisinopril (ZESTRIL) 2 5 mg tablet, Take 1 tablet (2 5 mg total) by mouth daily, Disp: 90 tablet, Rfl: 2    metFORMIN (GLUCOPHAGE-XR) 750 mg 24 hr tablet, Take 1 tablet (750 mg total) by mouth 2 (two) times a day before breakfast and lunch (Patient taking differently: Take 750 mg by mouth Daily at 2am), Disp: , Rfl:     montelukast (SINGULAIR) 10 mg tablet, Take 1 tablet (10 mg total) by mouth daily at bedtime, Disp: 90 tablet, Rfl: 1    phentermine 37 5 MG capsule, Take 37 5 mg by mouth every morning, Disp: , Rfl:     SUMAtriptan (IMITREX) 25 mg tablet, Take 1 tablet (25 mg total) by mouth once as needed for migraine for up to 30 doses, Disp: 30 tablet, Rfl: 2    topiramate (TOPAMAX) 50 MG tablet, Take 1 tablet (50 mg total) by mouth 2 (two) times a day, Disp: 180 tablet, Rfl: 1   RHIANNON 1 5 JL/6 9QD SOPN, INJECT SUBCUTANEOUSLY 1 5MG EVERY WEEK, Disp: , Rfl: 1    ACTIVE PROBLEM LIST:  Patient Active Problem List   Diagnosis    Abdominal pain, chronic, epigastric    Abnormal mammogram    Aftercare following surgery of the genitourinary system    Asthma, mild intermittent    Benign essential hypertension    Borderline abnormal thyroid function test    Diabetes ([facility] )    Diabetes mellitus with proteinuria (HCC)    Dysplastic nevi    Headache    Hyperproteinemia    Menopausal symptoms    Neuropathy, peripheral    Nonalcoholic fatty liver disease    Numbness and tingling in right hand    Obesity    Overactive bladder    Transient ischemic attack    Type 2 diabetes mellitus with microalbuminuria (HCC)    Vitamin D deficiency    Urinary, incontinence, stress female    Elevated alkaline phosphatase level    Persistent proteinuria    Incomplete emptying of bladder    Family history of MI (myocardial infarction)    Low serum HDL    Hyperlipidemia, unspecified    Multiple benign nevi    Paresthesia    Prolapse of female pelvic organs    Vagina bleeding    Word finding difficulty    Hematuria    Syncope    Migraine without aura and without status migrainosus, not intractable    Lump in neck       PAST MEDICAL HISTORY:  Past Medical History:   Diagnosis Date    Angina pectoris ([facility] )     Anxiety     Atypical chest pain     Last Assessed: 9/3/2014    Diabetes mellitus ([facility] )     Disease of thyroid gland     Endometriosis     Last Assessed:9/13/2016     Hypercalcemia     Last Assessed: 4/27/2017    Hyperlipidemia     Hyponatremia     Last assessed: 8/17/2016    Metrorrhagia     Last Assessed: 9/3/2014    Microscopic hematuria     Last Assessed: 9/14/2016    Obesity     PONV (postoperative nausea and vomiting)     Proteinuria     Shortness of breath     TIA (transient ischemic attack)        PAST SURGICAL HISTORY:  Past Surgical History:   Procedure Laterality Date    HYSTERECTOMY      Resolved: 12/2013    LAPAROSCOPIC CHOLECYSTECTOMY      Last Assessed: 10/17/2017    OOPHORECTOMY      Last Assessed: 7/6/2016    OVARIAN CYST REMOVAL Right     MD CYSTOURETHROSCOPY,FULGUR <0 5 CM CHUYITA N/A 3/23/2017    Procedure: CYSTOSCOPY; BLADDER BIOPSY; Jeraline Nail ;  Surgeon: Conchis Ybarra MD;  Location: [facility];  Service: Urology       FAMILY HISTORY:  Family History   Problem Relation Age of Onset    Diabetes Mother     Hyperlipidemia Mother    Ardeth Needs Hypertension Mother     Heart disease Mother     Anxiety disorder Mother    Ardeth Needs Arthritis Mother     Depression Mother     Heart attack Mother     Hyperlipidemia Father     Hypertension Father     Alcohol abuse Father     Diabetes Sister     Asthma Sister     Melanoma Sister     Breast cancer Sister     Depression Son     Anxiety disorder Daughter     Colon cancer Maternal Grandmother     Colon cancer Paternal Grandmother     Arthritis Maternal Aunt     Depression Maternal Aunt     Diabetes Maternal Aunt     Hypertension Maternal Aunt     Breast cancer Maternal Aunt     Diabetes Maternal Uncle     Arthritis Paternal Aunt     Diabetes Paternal Aunt     Hypertension Paternal Aunt     Breast cancer Paternal Aunt     Diabetes Paternal Uncle     Arthritis Cousin     Hypertension Cousin     Substance Abuse Cousin     Depression Child        SOCIAL HISTORY:  Social History     Socioeconomic History    Marital status:      Spouse name: Not on file    Number of children: Not on file    Years of education: Not on file    Highest education level: Not on file   Occupational History    Not on file   Tobacco Use    Smoking status: Never Smoker    Smokeless tobacco: Never Used   Vaping Use    Vaping Use: Never used   Substance and Sexual Activity    Alcohol use: No     Comment: social use per allscripts    Drug use: No    Sexual activity: Not Currently   Other Topics Concern    Not on file   Social History Narrative    Caffeine use     Social Determinants of Health     Financial Resource Strain: Not on file   Food Insecurity: Not on file   Transportation Needs: Not on file   Physical Activity: Not on file   Stress: Not on file   Social Connections: Not on file   Intimate Partner Violence: Not on file   Housing Stability: Not on file       Review of Systems   Psychiatric/Behavioral: Positive for decreased concentration and dysphoric mood  The patient is nervous/anxious  All other systems reviewed and are negative  Objective:  Vitals:    09/14/22 0815   BP: 116/78   BP Location: Left arm   Patient Position: Sitting   Pulse: 78   Resp: 12   SpO2: 100%   Weight: 69 9 kg (154 lb)   Height: 5' 3" (1 6 m)     Body mass index is 27 28 kg/m²  Physical Exam  Vitals and nursing note reviewed  Constitutional:       Appearance: Normal appearance  HENT:      Head: Normocephalic and atraumatic  Cardiovascular:      Rate and Rhythm: Normal rate and regular rhythm  Heart sounds: Normal heart sounds  Pulmonary:      Effort: Pulmonary effort is normal       Breath sounds: Normal breath sounds  Musculoskeletal:         General: Normal range of motion  Cervical back: Normal range of motion  Skin:     General: Skin is warm and dry  Coloration: Skin is pale  Neurological:      General: No focal deficit present  Mental Status: She is alert and oriented to person, place, and time  Mental status is at baseline  Psychiatric:         Mood and Affect: Mood is anxious  Affect is tearful  Behavior: Behavior normal            RESULTS:    Recent Results (from the past 1008 hour(s))   HEMOGLOBIN A1C    Collection Time: 08/09/22 10:26 AM   Result Value Ref Range    Hemoglobin A1C 5 8 (H) <5 7 %    eAG, EST AVG Glucose 120 <154 mg/dL       This note was created with voice recognition software    Phonic, grammatical and spelling errors may be present within the note as a result

## 2022-09-22 ENCOUNTER — CONSULT (OUTPATIENT)
Dept: SURGERY | Facility: CLINIC | Age: 47
End: 2022-09-22
Payer: COMMERCIAL

## 2022-09-22 VITALS
SYSTOLIC BLOOD PRESSURE: 116 MMHG | DIASTOLIC BLOOD PRESSURE: 72 MMHG | BODY MASS INDEX: 28.35 KG/M2 | RESPIRATION RATE: 16 BRPM | TEMPERATURE: 98 F | HEART RATE: 85 BPM | WEIGHT: 160 LBS | HEIGHT: 63 IN | OXYGEN SATURATION: 99 %

## 2022-09-22 DIAGNOSIS — L72.11 PILAR CYST: ICD-10-CM

## 2022-09-22 PROCEDURE — 3078F DIAST BP <80 MM HG: CPT | Performed by: SURGERY

## 2022-09-22 PROCEDURE — 3074F SYST BP LT 130 MM HG: CPT | Performed by: SURGERY

## 2022-09-22 PROCEDURE — 99243 OFF/OP CNSLTJ NEW/EST LOW 30: CPT | Performed by: SURGERY

## 2022-09-22 NOTE — PROGRESS NOTES
Assessment/Plan:     1  Pilar cyst  -     Ambulatory Referral to General Surgery  -     Case request operating room: EXCISION LESION/MASS SCALP; Standing  -     Case request operating room: EXCISION LESION/MASS SCALP        Labs reviewed from last month  We discussed the risks not limited to bleeding, infection and recurrence  Consent signed  Subjective:      Patient ID: Trent Krabbe is a 56 y o  female  Triage Notes:    Ms Marci Johnson is a 55 yo F presenting with a scalp cyst  She has had this removed prior - about a year ago at another facility  It was done bedside with local  There is a little discomfort related to it being right along her hairline near the neck  The following portions of the patient's history were reviewed and updated as appropriate:   She  has a past medical history of Angina pectoris ([address] ), Anxiety, Atypical chest pain, Diabetes mellitus ([address] ), Disease of thyroid gland, Endometriosis, Hypercalcemia, Hyperlipidemia, Hyponatremia, Metrorrhagia, Microscopic hematuria, Obesity, PONV (postoperative nausea and vomiting), Proteinuria, Shortness of breath, and TIA (transient ischemic attack)    She   Patient Active Problem List    Diagnosis Date Noted    Migraine without aura and without status migrainosus, not intractable 07/20/2020    Lump in neck 07/20/2020    Hyperlipidemia, unspecified 11/20/2019    Multiple benign nevi 11/20/2019    Paresthesia 11/20/2019    Prolapse of female pelvic organs 11/20/2019    Vagina bleeding 11/20/2019    Word finding difficulty 11/20/2019    Hematuria 11/20/2019    Syncope 11/20/2019    Low serum HDL 06/26/2019    Family history of MI (myocardial infarction) 12/11/2018    Persistent proteinuria 05/03/2018    Elevated alkaline phosphatase level 02/27/2018    Aftercare following surgery of the genitourinary system 02/15/2018    Incomplete emptying of bladder 01/08/2018    Menopausal symptoms 12/05/2017    Overactive bladder 12/05/2017    Abdominal pain, chronic, epigastric 11/07/2017    Diabetes mellitus with proteinuria ([facility] ) 10/17/2017    Urinary, incontinence, stress female 01/06/2017    Type 2 diabetes mellitus with microalbuminuria ([place] [address] ) 09/12/2016    Headache 08/10/2016    Neuropathy, peripheral 08/10/2016    Abnormal mammogram 08/08/2016    Transient ischemic attack 07/28/2016    Hyperproteinemia 06/24/2016    Dysplastic nevi 06/08/2016    Numbness and tingling in right hand 06/08/2016    Vitamin D deficiency 07/10/2015    Borderline abnormal thyroid function test 02/10/2015    Benign essential hypertension 12/10/2014    Diabetes ([place] [address] ) 66/61/1576    Nonalcoholic fatty liver disease 09/10/2014    Asthma, mild intermittent 09/03/2014    Obesity 09/03/2014     She  has a past surgical history that includes Ovarian cyst removal (Right); Hysterectomy; pr cystourethroscopy,fulgur <0 5 cm lesn (N/A, 3/23/2017); Laparoscopic cholecystectomy; and Oophorectomy  Her family history includes Alcohol abuse in her father; Anxiety disorder in her daughter and mother; Arthritis in her cousin, maternal aunt, mother, and paternal aunt; Asthma in her sister; Breast cancer in her maternal aunt, paternal aunt, and sister; Colon cancer in her maternal grandmother and paternal grandmother; Depression in her child, maternal aunt, mother, and son; Diabetes in her maternal aunt, maternal uncle, mother, paternal aunt, paternal uncle, and sister; Heart attack in her mother; Heart disease in her mother; Hyperlipidemia in her father and mother; Hypertension in her cousin, father, maternal aunt, mother, and paternal aunt; Melanoma in her sister; Substance Abuse in her cousin  She  reports that she has never smoked  She has never used smokeless tobacco  She reports that she does not drink alcohol and does not use drugs    Current Outpatient Medications on File Prior to Visit   Medication Sig    ACCU-CHEK FASTCLIX LANCETS MISC TEST 2 TIMES A DAY    albuterol (PROVENTIL HFA,VENTOLIN HFA) 90 mcg/act inhaler Inhale 2 puffs every 6 (six) hours as needed for wheezing    atorvastatin (LIPITOR) 20 mg tablet Take 1 tablet (20 mg total) by mouth daily at bedtime    buPROPion (WELLBUTRIN XL) 150 mg 24 hr tablet Take 150 mg by mouth every morning    gabapentin (NEURONTIN) 100 mg capsule Take 3 capsules (300 mg total) by mouth daily at bedtime    glucagon 1 MG injection Inject as directed    glucose blood test strip by In Vitro route 4 (four) times a day    levothyroxine 75 mcg tablet Take 1 tablet (75 mcg total) by mouth daily in the early morning    lisinopril (ZESTRIL) 2 5 mg tablet Take 1 tablet (2 5 mg total) by mouth daily    metFORMIN (GLUCOPHAGE-XR) 750 mg 24 hr tablet Take 1 tablet (750 mg total) by mouth 2 (two) times a day before breakfast and lunch (Patient taking differently: Take 750 mg by mouth Daily at 2am)    montelukast (SINGULAIR) 10 mg tablet Take 1 tablet (10 mg total) by mouth daily at bedtime    phentermine 37 5 MG capsule Take 37 5 mg by mouth every morning    SUMAtriptan (IMITREX) 25 mg tablet Take 1 tablet (25 mg total) by mouth once as needed for migraine for up to 30 doses    topiramate (TOPAMAX) 50 MG tablet Take 1 tablet (50 mg total) by mouth 2 (two) times a day    TRULICITY 1 5 BC/1 1VX SOPN INJECT SUBCUTANEOUSLY 1 5MG EVERY WEEK     No current facility-administered medications on file prior to visit  She is allergic to cephalosporins, norflex [orphenadrine], rocephin [ceftriaxone], sulfa antibiotics, and ultracet [tramadol-acetaminophen]       Review of Systems   Constitutional: Negative for activity change and appetite change  HENT: Negative for congestion, hearing loss, sore throat and trouble swallowing  Eyes: Negative for discharge and visual disturbance  Respiratory: Negative for cough, chest tightness, shortness of breath and wheezing  Cardiovascular: Negative for chest pain and palpitations  Gastrointestinal: Negative for abdominal pain  Endocrine: Negative for cold intolerance and heat intolerance  Genitourinary: Negative for difficulty urinating  Musculoskeletal: Negative for gait problem  Skin: Negative for color change, rash and wound  Neurological: Negative for dizziness, speech difficulty and headaches  Psychiatric/Behavioral: Negative for behavioral problems and confusion  The patient is not nervous/anxious  Objective:      /72   Pulse 85   Temp 98 °F (36 7 °C) (Temporal)   Resp 16   Ht 5' 3" (1 6 m)   Wt 72 6 kg (160 lb)   SpO2 99%   BMI 28 34 kg/m²     Below is the patient's most recent value for Albumin, ALT, AST, BUN, Calcium, Chloride, Cholesterol, CO2, Creatinine, GFR, Glucose, HDL, Hematocrit, Hemoglobin, Hemoglobin A1C, LDL, Magnesium, Phosphorus, Platelets, Potassium, PSA, Sodium, Triglycerides, and WBC  Lab Results   Component Value Date    ALT 35 05/09/2022    AST 16 05/09/2022    BUN 13 05/09/2022    CALCIUM 9 8 05/09/2022     (H) 05/09/2022    CO2 24 05/09/2022    CREATININE 0 90 05/09/2022    HDL 42 (L) 05/09/2022    HCT 44 0 05/09/2022    HGB 14 3 05/09/2022    HGBA1C 5 8 (H) 08/09/2022    MG 1 9 08/10/2020    PHOS 3 4 09/09/2016     05/09/2022    K 3 8 05/09/2022    TRIG 61 05/09/2022    WBC 7 37 05/09/2022     Note: for a comprehensive list of the patient's lab results, access the Results Review activity  Physical Exam  Vitals and nursing note reviewed  Constitutional:       General: She is not in acute distress  Appearance: She is well-developed and normal weight  She is not ill-appearing, toxic-appearing or diaphoretic  HENT:      Head: Normocephalic and atraumatic  Comments: Posterior scalf there is a ~1cm mobile subdermal mass  Eyes:      Pupils: Pupils are equal, round, and reactive to light  Cardiovascular:      Rate and Rhythm: Normal rate and regular rhythm     Pulmonary:      Effort: Pulmonary effort is normal  No respiratory distress  Abdominal:      Palpations: Abdomen is soft  Musculoskeletal:         General: Normal range of motion  Cervical back: Normal range of motion and neck supple  Skin:     General: Skin is warm and dry  Neurological:      Mental Status: She is alert and oriented to person, place, and time  Psychiatric:         Mood and Affect: Mood normal          Behavior: Behavior normal          Thought Content:  Thought content normal          Judgment: Judgment normal              Procedures

## 2022-09-26 ENCOUNTER — TELEPHONE (OUTPATIENT)
Dept: SURGERY | Facility: CLINIC | Age: 47
End: 2022-09-26

## 2022-09-26 NOTE — TELEPHONE ENCOUNTER
Pt aware that her procedure tomorrow 9/27 with Leonard Re: cyst on scalp  Has been cxd    Pt will await Saira's (surgical coordinators) call tomorrow

## 2022-09-29 ENCOUNTER — TELEPHONE (OUTPATIENT)
Dept: SURGERY | Facility: CLINIC | Age: 47
End: 2022-09-29

## 2022-10-24 ENCOUNTER — ANESTHESIA EVENT (OUTPATIENT)
Dept: PERIOP | Facility: HOSPITAL | Age: 47
End: 2022-10-24
Payer: COMMERCIAL

## 2022-10-24 NOTE — ANESTHESIA PREPROCEDURE EVALUATION
Procedure:  EXCISION LESION/MASS SCALP (N/A Head)    Relevant Problems   CARDIO   (+) Benign essential hypertension   (+) Hyperlipidemia, unspecified   (+) Migraine without aura and without status migrainosus, not intractable      ENDO   (+) Type 2 diabetes mellitus with microalbuminuria (HCC)      GI/HEPATIC   (+) Nonalcoholic fatty liver disease      NEURO/PSYCH   (+) Headache   (+) Migraine without aura and without status migrainosus, not intractable   (+) Numbness and tingling in right hand   (+) Paresthesia   (+) Transient ischemic attack   (+) Word finding difficulty      PULMONARY   (+) Asthma, mild intermittent      PONV (postoperative nausea and vomiting)    Diabetes mellitus ([facility] )    Disease of thyroid gland    Hyperlipidemia    Obesity    TIA (transient ischemic attack)    Proteinuria    Hyponatremia Last assessed: 8/17/2016   Anxiety    Angina pectoris ([facility] )    Shortness of breath    Atypical chest pain Last Assessed: 9/3/2014   Endometriosis Last Assessed:9/13/2016    Hypercalcemia Last Assessed: 4/27/2017   Metrorrhagia Last Assessed: 9/3/2014   Microscopic hematuria Last Assessed: 9/14/2016   History of transfusion    Fatty liver             Anesthesia Plan  ASA Score- 2     Anesthesia Type- IV sedation with anesthesia with ASA Monitors  Additional Monitors:   Airway Plan:           Plan Factors-Exercise tolerance (METS): >4 METS  Chart reviewed  EKG reviewed  Imaging results reviewed  Existing labs reviewed  Patient summary reviewed  Induction- intravenous  Postoperative Plan-     Informed Consent- Anesthetic plan and risks discussed with patient  I personally reviewed this patient with the CRNA  Discussed and agreed on the Anesthesia Plan with the CRNA  Mervat Bourne

## 2022-10-25 ENCOUNTER — ANESTHESIA (OUTPATIENT)
Dept: PERIOP | Facility: HOSPITAL | Age: 47
End: 2022-10-25
Payer: COMMERCIAL

## 2022-10-25 ENCOUNTER — HOSPITAL ENCOUNTER (OUTPATIENT)
Facility: HOSPITAL | Age: 47
Setting detail: OUTPATIENT SURGERY
Discharge: HOME | End: 2022-10-25
Attending: SURGERY | Admitting: SURGERY
Payer: COMMERCIAL

## 2022-10-25 VITALS
TEMPERATURE: 97.2 F | BODY MASS INDEX: 27.11 KG/M2 | WEIGHT: 153 LBS | RESPIRATION RATE: 20 BRPM | SYSTOLIC BLOOD PRESSURE: 97 MMHG | OXYGEN SATURATION: 95 % | DIASTOLIC BLOOD PRESSURE: 65 MMHG | HEART RATE: 74 BPM | HEIGHT: 63 IN

## 2022-10-25 DIAGNOSIS — L72.11 PILAR CYST: ICD-10-CM

## 2022-10-25 LAB — GLUCOSE SERPL-MCNC: 93 MG/DL (ref 65–140)

## 2022-10-25 PROCEDURE — 82948 REAGENT STRIP/BLOOD GLUCOSE: CPT

## 2022-10-25 RX ORDER — LIDOCAINE HYDROCHLORIDE 10 MG/ML
INJECTION, SOLUTION EPIDURAL; INFILTRATION; INTRACAUDAL; PERINEURAL AS NEEDED
Status: DISCONTINUED | OUTPATIENT
Start: 2022-10-25 | End: 2022-10-25

## 2022-10-25 RX ORDER — MIDAZOLAM HYDROCHLORIDE 2 MG/2ML
INJECTION, SOLUTION INTRAMUSCULAR; INTRAVENOUS AS NEEDED
Status: DISCONTINUED | OUTPATIENT
Start: 2022-10-25 | End: 2022-10-25

## 2022-10-25 RX ORDER — DEXMEDETOMIDINE HYDROCHLORIDE 100 UG/ML
INJECTION, SOLUTION INTRAVENOUS AS NEEDED
Status: DISCONTINUED | OUTPATIENT
Start: 2022-10-25 | End: 2022-10-25

## 2022-10-25 RX ORDER — SODIUM CHLORIDE, SODIUM LACTATE, POTASSIUM CHLORIDE, CALCIUM CHLORIDE 600; 310; 30; 20 MG/100ML; MG/100ML; MG/100ML; MG/100ML
INJECTION, SOLUTION INTRAVENOUS CONTINUOUS PRN
Status: DISCONTINUED | OUTPATIENT
Start: 2022-10-25 | End: 2022-10-25

## 2022-10-25 RX ORDER — GINSENG 100 MG
CAPSULE ORAL AS NEEDED
Status: DISCONTINUED | OUTPATIENT
Start: 2022-10-25 | End: 2022-10-25 | Stop reason: HOSPADM

## 2022-10-25 RX ORDER — CLINDAMYCIN PHOSPHATE 600 MG/50ML
600 INJECTION INTRAVENOUS ONCE
Status: COMPLETED | OUTPATIENT
Start: 2022-10-25 | End: 2022-10-25

## 2022-10-25 RX ORDER — PROPOFOL 10 MG/ML
INJECTION, EMULSION INTRAVENOUS CONTINUOUS PRN
Status: DISCONTINUED | OUTPATIENT
Start: 2022-10-25 | End: 2022-10-25

## 2022-10-25 RX ORDER — FENTANYL CITRATE 50 UG/ML
INJECTION, SOLUTION INTRAMUSCULAR; INTRAVENOUS AS NEEDED
Status: DISCONTINUED | OUTPATIENT
Start: 2022-10-25 | End: 2022-10-25

## 2022-10-25 RX ORDER — ONDANSETRON 2 MG/ML
INJECTION INTRAMUSCULAR; INTRAVENOUS AS NEEDED
Status: DISCONTINUED | OUTPATIENT
Start: 2022-10-25 | End: 2022-10-25

## 2022-10-25 RX ORDER — LIDOCAINE HYDROCHLORIDE AND EPINEPHRINE 10; 10 MG/ML; UG/ML
INJECTION, SOLUTION INFILTRATION; PERINEURAL AS NEEDED
Status: DISCONTINUED | OUTPATIENT
Start: 2022-10-25 | End: 2022-10-25 | Stop reason: HOSPADM

## 2022-10-25 RX ADMIN — PROPOFOL 30 MCG/KG/MIN: 10 INJECTION, EMULSION INTRAVENOUS at 08:05

## 2022-10-25 RX ADMIN — MIDAZOLAM 2 MG: 1 INJECTION INTRAMUSCULAR; INTRAVENOUS at 07:52

## 2022-10-25 RX ADMIN — DEXMEDETOMIDINE HCL 4 MCG: 100 INJECTION INTRAVENOUS at 08:09

## 2022-10-25 RX ADMIN — SODIUM CHLORIDE, SODIUM LACTATE, POTASSIUM CHLORIDE, AND CALCIUM CHLORIDE: .6; .31; .03; .02 INJECTION, SOLUTION INTRAVENOUS at 07:50

## 2022-10-25 RX ADMIN — FENTANYL CITRATE 50 MCG: 50 INJECTION INTRAMUSCULAR; INTRAVENOUS at 07:56

## 2022-10-25 RX ADMIN — ONDANSETRON 4 MG: 2 INJECTION INTRAMUSCULAR; INTRAVENOUS at 08:06

## 2022-10-25 RX ADMIN — FENTANYL CITRATE 25 MCG: 50 INJECTION INTRAMUSCULAR; INTRAVENOUS at 08:13

## 2022-10-25 RX ADMIN — CLINDAMYCIN PHOSPHATE 600 MG: 600 INJECTION, SOLUTION INTRAVENOUS at 07:50

## 2022-10-25 RX ADMIN — DEXMEDETOMIDINE HCL 8 MCG: 100 INJECTION INTRAVENOUS at 08:05

## 2022-10-25 RX ADMIN — LIDOCAINE HYDROCHLORIDE 20 MG: 10 INJECTION, SOLUTION EPIDURAL; INFILTRATION; INTRACAUDAL; PERINEURAL at 08:05

## 2022-10-25 RX ADMIN — FENTANYL CITRATE 25 MCG: 50 INJECTION INTRAMUSCULAR; INTRAVENOUS at 08:28

## 2022-10-25 RX ADMIN — DEXMEDETOMIDINE HCL 8 MCG: 100 INJECTION INTRAVENOUS at 07:58

## 2022-10-25 NOTE — NURSING NOTE
Incision on the back of patient's head is clean and with bacitracin on it  Sutures are intact  Ice maintained on incision

## 2022-10-25 NOTE — OP NOTE
OPERATIVE REPORT  PATIENT NAME: Reyna Blanchard    :  1975  MRN: [id number]  Pt Location: [facility] OR ROOM 02    SURGERY DATE: 10/25/2022    Surgeon(s) and Role:     * Taj Arteaga MD - Primary    Preop Diagnosis:  Pilar cyst [L72 11]    Post-Op Diagnosis Codes:     * Pilar cyst [L72 11]    Procedure(s) (LRB):  EXCISION LESION/MASS SCALP (N/A)    Specimen(s):  ID Type Source Tests Collected by Time Destination   1 : Scalp Cyst Tissue Cyst TISSUE EXAM Taj Arteaga MD 10/25/2022 [id number]        Estimated Blood Loss:   Minimal    Drains:  none    Anesthesia Type:   IV Sedation with Anesthesia    Operative Indications:  Pilar cyst [L72 11]      Operative Findings:  Pilar cyst of scalp (posterior near hairline)    Complications:   None    Procedure and Technique:  The patient was seen in preop holding where the location of the lesion was confirmed and marked  The H&P was updated and the procedure reviewed with the patient  The patient was then brought to the OR and placed in lateral position  The patient was sedated  The site was clipped, prepped with betadine and draped in sterile fashion  A preincision time out was initiated by myself and confirmed the patient, procedure, site/side and any applicable marking as well as preoperative antibiotics  The skin and subcutaneous tissue was then anesthetized with 1% lidocaine with epinephrine  A 15 blade was used to make a 1 5 cm incision  This was carried down through the dermis and into the subcutaneous space and the lesion was encountered  A mao/tenotomy scissor was used to disect the mass free from the surrounding subcutaneous tissue  The bovey was used to achieve hemostasis  The lesion appeared to be about 1 cm in size and consistent with a pilar cyst and was passed off as specimen  The wound was then irrigated and reapproximated using 4-0  monocryl  The wound was then covered with steri bacitracin        I was present for the entire procedure and A qualified resident physician was not available    Patient Disposition:  APU and hemodynamically stable        SIGNATURE: Barbra Engel MD  DATE: October 25, 2022  TIME: 8:33 AM

## 2022-10-25 NOTE — H&P
Consult    9/22/2022  [facility], [place]      General Surgery  Pilar cyst      Dx  Advice Only; Referred by Vel Silva MD      Reason for Visit       Progress Notes  Ishaan Haskins MD (Physician) • • General Surgery     Assessment/Plan:      1  Pilar cyst  -     Ambulatory Referral to General Surgery  -     Case request operating room: EXCISION LESION/MASS SCALP; Standing  -     Case request operating room: EXCISION LESION/MASS SCALP         Labs reviewed from last month  We discussed the risks not limited to bleeding, infection and recurrence  Consent signed          Subjective:       Patient ID: Yessi Rosado is a 56 y o  female      Triage Notes:     Ms Kaia Harper is a 55 yo F presenting with a scalp cyst  She has had this removed prior - about a year ago at another facility  It was done bedside with local  There is a little discomfort related to it being right along her hairline near the neck          The following portions of the patient's history were reviewed and updated as appropriate:   She  has a past medical history of Angina pectoris ([address] ), Anxiety, Atypical chest pain, Diabetes mellitus ([address] ), Disease of thyroid gland, Endometriosis, Hypercalcemia, Hyperlipidemia, Hyponatremia, Metrorrhagia, Microscopic hematuria, Obesity, PONV (postoperative nausea and vomiting), Proteinuria, Shortness of breath, and TIA (transient ischemic attack)    She        Patient Active Problem List     Diagnosis Date Noted   • Migraine without aura and without status migrainosus, not intractable 07/20/2020   • Lump in neck 07/20/2020   • Hyperlipidemia, unspecified 11/20/2019   • Multiple benign nevi 11/20/2019   • Paresthesia 11/20/2019   • Prolapse of female pelvic organs 11/20/2019   • Vagina bleeding 11/20/2019   • Word finding difficulty 11/20/2019   • Hematuria 11/20/2019   • Syncope 11/20/2019   • Low serum HDL 06/26/2019   • Family history of MI (myocardial infarction) 12/11/2018   • Persistent proteinuria 05/03/2018   • Elevated alkaline phosphatase level 02/27/2018   • Aftercare following surgery of the genitourinary system 02/15/2018   • Incomplete emptying of bladder 01/08/2018   • Menopausal symptoms 12/05/2017   • Overactive bladder 12/05/2017   • Abdominal pain, chronic, epigastric 11/07/2017   • Diabetes mellitus with proteinuria ([facility] ) 10/17/2017   • Urinary, incontinence, stress female 01/06/2017   • Type 2 diabetes mellitus with microalbuminuria ([facility] ) 09/12/2016   • Headache 08/10/2016   • Neuropathy, peripheral 08/10/2016   • Abnormal mammogram 08/08/2016   • Transient ischemic attack 07/28/2016   • Hyperproteinemia 06/24/2016   • Dysplastic nevi 06/08/2016   • Numbness and tingling in right hand 06/08/2016   • Vitamin D deficiency 07/10/2015   • Borderline abnormal thyroid function test 02/10/2015   • Benign essential hypertension 12/10/2014   • Diabetes ([place] [address] ) 18/97/0445   • Nonalcoholic fatty liver disease 09/10/2014   • Asthma, mild intermittent 09/03/2014   • Obesity 09/03/2014      She  has a past surgical history that includes Ovarian cyst removal (Right); Hysterectomy; pr cystourethroscopy,fulgur <0 5 cm lesn (N/A, 3/23/2017); Laparoscopic cholecystectomy; and Oophorectomy  Her family history includes Alcohol abuse in her father; Anxiety disorder in her daughter and mother; Arthritis in her cousin, maternal aunt, mother, and paternal aunt; Asthma in her sister; Breast cancer in her maternal aunt, paternal aunt, and sister; Colon cancer in her maternal grandmother and paternal grandmother; Depression in her child, maternal aunt, mother, and son; Diabetes in her maternal aunt, maternal uncle, mother, paternal aunt, paternal uncle, and sister;  Heart attack in her mother; Heart disease in her mother; Hyperlipidemia in her father and mother; Hypertension in her cousin, father, maternal aunt, mother, and paternal aunt; Melanoma in her sister; Substance Abuse in her cousin  She  reports that she has never smoked  She has never used smokeless tobacco  She reports that she does not drink alcohol and does not use drugs  Current Outpatient Medications on File Prior to Visit   Medication Sig   • ACCU-CHEK FASTCLIX LANCETS MISC TEST 2 TIMES A DAY   • albuterol (PROVENTIL HFA,VENTOLIN HFA) 90 mcg/act inhaler Inhale 2 puffs every 6 (six) hours as needed for wheezing   • atorvastatin (LIPITOR) 20 mg tablet Take 1 tablet (20 mg total) by mouth daily at bedtime   • buPROPion (WELLBUTRIN XL) 150 mg 24 hr tablet Take 150 mg by mouth every morning   • gabapentin (NEURONTIN) 100 mg capsule Take 3 capsules (300 mg total) by mouth daily at bedtime   • glucagon 1 MG injection Inject as directed   • glucose blood test strip by In Vitro route 4 (four) times a day   • levothyroxine 75 mcg tablet Take 1 tablet (75 mcg total) by mouth daily in the early morning   • lisinopril (ZESTRIL) 2 5 mg tablet Take 1 tablet (2 5 mg total) by mouth daily   • metFORMIN (GLUCOPHAGE-XR) 750 mg 24 hr tablet Take 1 tablet (750 mg total) by mouth 2 (two) times a day before breakfast and lunch (Patient taking differently: Take 750 mg by mouth Daily at 2am)   • montelukast (SINGULAIR) 10 mg tablet Take 1 tablet (10 mg total) by mouth daily at bedtime   • phentermine 37 5 MG capsule Take 37 5 mg by mouth every morning   • SUMAtriptan (IMITREX) 25 mg tablet Take 1 tablet (25 mg total) by mouth once as needed for migraine for up to 30 doses   • topiramate (TOPAMAX) 50 MG tablet Take 1 tablet (50 mg total) by mouth 2 (two) times a day   • TRULICITY 1 5 GW/6 0SX SOPN INJECT SUBCUTANEOUSLY 1 5MG EVERY WEEK      No current facility-administered medications on file prior to visit       She is allergic to cephalosporins, norflex [orphenadrine], rocephin [ceftriaxone], sulfa antibiotics, and ultracet [tramadol-acetaminophen]        Review of Systems   Constitutional: Negative for activity change and appetite change  HENT: Negative for congestion, hearing loss, sore throat and trouble swallowing  Eyes: Negative for discharge and visual disturbance  Respiratory: Negative for cough, chest tightness, shortness of breath and wheezing  Cardiovascular: Negative for chest pain and palpitations  Gastrointestinal: Negative for abdominal pain  Endocrine: Negative for cold intolerance and heat intolerance  Genitourinary: Negative for difficulty urinating  Musculoskeletal: Negative for gait problem  Skin: Negative for color change, rash and wound  Neurological: Negative for dizziness, speech difficulty and headaches  Psychiatric/Behavioral: Negative for behavioral problems and confusion  The patient is not nervous/anxious            Objective:        /72   Pulse 85   Temp 98 °F (36 7 °C) (Temporal)   Resp 16   Ht 5' 3" (1 6 m)   Wt 72 6 kg (160 lb)   SpO2 99%   BMI 28 34 kg/m²      Below is the patient's most recent value for Albumin, ALT, AST, BUN, Calcium, Chloride, Cholesterol, CO2, Creatinine, GFR, Glucose, HDL, Hematocrit, Hemoglobin, Hemoglobin A1C, LDL, Magnesium, Phosphorus, Platelets, Potassium, PSA, Sodium, Triglycerides, and WBC  Lab Results   Component Value Date     ALT 35 05/09/2022     AST 16 05/09/2022     BUN 13 05/09/2022     CALCIUM 9 8 05/09/2022      (H) 05/09/2022     CO2 24 05/09/2022     CREATININE 0 90 05/09/2022     HDL 42 (L) 05/09/2022     HCT 44 0 05/09/2022     HGB 14 3 05/09/2022     HGBA1C 5 8 (H) 08/09/2022     MG 1 9 08/10/2020     PHOS 3 4 09/09/2016      05/09/2022     K 3 8 05/09/2022     TRIG 61 05/09/2022     WBC 7 37 05/09/2022      Note: for a comprehensive list of the patient's lab results, access the Results Review activity       Physical Exam  Vitals and nursing note reviewed  Constitutional:       General: She is not in acute distress  Appearance: She is well-developed and normal weight   She is not ill-appearing, toxic-appearing or diaphoretic  HENT:      Head: Normocephalic and atraumatic  Comments: Posterior scalf there is a ~1cm mobile subdermal mass  Eyes:      Pupils: Pupils are equal, round, and reactive to light  Cardiovascular:      Rate and Rhythm: Normal rate and regular rhythm  Pulmonary:      Effort: Pulmonary effort is normal  No respiratory distress  Abdominal:      Palpations: Abdomen is soft  Musculoskeletal:         General: Normal range of motion  Cervical back: Normal range of motion and neck supple  Skin:     General: Skin is warm and dry  Neurological:      Mental Status: She is alert and oriented to person, place, and time  Psychiatric:         Mood and Affect: Mood normal          Behavior: Behavior normal          Thought Content:  Thought content normal          Judgment: Judgment normal                 Procedures          Instructions     COVID Immunization Verification (Printed 9/22/2022),     COVID Lab Result Verification (Printed 9/22/2022),     After Visit Summary (Printed 9/22/2022)        Additional Documentation    Vitals:  /72     Pulse 85     Temp 98 °F (36 7 °C) (Temporal)     Resp 16     Ht 5' 3" (1 6 m)     Wt 72 6 kg (160 lb)     SpO2 99%     BMI 28 34 kg/m²     BSA 1 76 m²     Pain Sc 0-No pain (Loc: Head) (Edu: Yes)            More Vitals     Flowsheets:  Vitals Reassessment        SmartForms:   [facility] PRE-CHARTING        Encounter Info:  Billing Info,     History,     Allergies,     Detailed Report          Communications         Provider Notes sent to Jose Welsh MD          Orders Performed     Ambulatory Referral to General Surgery Closed      Medication Changes         None       Medication List     Visit Diagnoses         Pilar cyst       Problem List

## 2022-10-25 NOTE — ANESTHESIA POSTPROCEDURE EVALUATION
Post-Op Assessment Note    CV Status:  Stable  Pain Score: 0    Pain management: adequate     Mental Status:  Alert and awake   Hydration Status:  Euvolemic   PONV Controlled:  Controlled   Airway Patency:  Patent      Post Op Vitals Reviewed: Yes      Staff: CRNA         No complications documented      BP   88/50   Temp   97   Pulse  75   Resp 18   SpO2   98% 2L NC

## 2022-11-01 DIAGNOSIS — E78.5 HYPERLIPIDEMIA, UNSPECIFIED HYPERLIPIDEMIA TYPE: ICD-10-CM

## 2022-11-01 RX ORDER — ATORVASTATIN CALCIUM 20 MG/1
20 TABLET, FILM COATED ORAL
Qty: 90 TABLET | Refills: 2 | Status: SHIPPED | OUTPATIENT
Start: 2022-11-01

## 2022-11-30 ENCOUNTER — OFFICE VISIT (OUTPATIENT)
Dept: INTERNAL MEDICINE CLINIC | Facility: CLINIC | Age: 47
End: 2022-11-30

## 2022-11-30 VITALS
BODY MASS INDEX: 27.29 KG/M2 | SYSTOLIC BLOOD PRESSURE: 124 MMHG | HEART RATE: 82 BPM | DIASTOLIC BLOOD PRESSURE: 86 MMHG | OXYGEN SATURATION: 99 % | RESPIRATION RATE: 12 BRPM | WEIGHT: 154 LBS | HEIGHT: 63 IN

## 2022-11-30 DIAGNOSIS — I10 ESSENTIAL HYPERTENSION: ICD-10-CM

## 2022-11-30 DIAGNOSIS — E11.21 TYPE 2 DIABETES MELLITUS WITH DIABETIC NEPHROPATHY, WITHOUT LONG-TERM CURRENT USE OF INSULIN (HCC): ICD-10-CM

## 2022-11-30 DIAGNOSIS — Z23 NEED FOR INFLUENZA VACCINATION: Primary | ICD-10-CM

## 2022-11-30 DIAGNOSIS — E78.5 HYPERLIPIDEMIA, UNSPECIFIED HYPERLIPIDEMIA TYPE: ICD-10-CM

## 2022-11-30 DIAGNOSIS — F43.21 GRIEVING: ICD-10-CM

## 2022-11-30 PROBLEM — R74.8 LOW SERUM HDL: Status: RESOLVED | Noted: 2019-06-26 | Resolved: 2022-11-30

## 2022-11-30 PROBLEM — R33.9 INCOMPLETE EMPTYING OF BLADDER: Status: RESOLVED | Noted: 2018-01-08 | Resolved: 2022-11-30

## 2022-11-30 PROBLEM — R22.1 LUMP IN NECK: Status: RESOLVED | Noted: 2020-07-20 | Resolved: 2022-11-30

## 2022-11-30 PROBLEM — R74.8 ELEVATED ALKALINE PHOSPHATASE LEVEL: Status: RESOLVED | Noted: 2018-02-27 | Resolved: 2022-11-30

## 2022-11-30 NOTE — PROGRESS NOTES
Assessment/Plan:     Robinson Villagomez is here for follow up; she reports her mother passed away after being neglected at a local hospital in [address]; she is having trouble grieving understandingly  She had to move [facility] issues r/t her cats  Due for mammogram, colonoscopy  Quality Measures:     BMI Counseling: There is no height or weight on file to calculate BMI  The BMI is above normal  Nutrition recommendations include decreasing portion sizes and encouraging healthy choices of fruits and vegetables  Exercise recommendations include moderate physical activity 150 minutes/week  Rationale for BMI follow-up plan is due to patient being overweight or obese  Depression Screening and Follow-up Plan: Clincally patient does not have depression  No treatment is required  Return in about 4 weeks (around 12/28/2022)  No problem-specific Assessment & Plan notes found for this encounter  Diagnoses and all orders for this visit:    Need for influenza vaccination  -     influenza vaccine, quadrivalent, 0 5 mL, preservative-free, for adult and pediatric patients 6 mos+ (AFLURIA, FLUARIX, FLULAVAL, FLUZONE)    Hyperlipidemia, unspecified hyperlipidemia type  -     Lipid Panel with Direct LDL reflex; Future    Essential hypertension  -     CBC and differential; Future  -     Comprehensive metabolic panel; Future  -     TSH, 3rd generation with Free T4 reflex; Future    Type 2 diabetes mellitus with diabetic nephropathy, without long-term current use of insulin (HCC)  -     Hemoglobin A1C; Future    Grieving          Subjective:      Patient ID: Sai Snyder is a 56 y o  female  Here for routine follow up        ALLERGIES:  Allergies   Allergen Reactions   • Cephalosporins Anaphylaxis   • Norflex [Orphenadrine] Anaphylaxis   • Rocephin [Ceftriaxone] Anaphylaxis   • Sulfa Antibiotics Hives   • Ultracet [Tramadol-Acetaminophen] Hives       CURRENT MEDICATIONS:    Current Outpatient Medications:   • ACCU-CHEK FASTCLIX LANCETS MISC, TEST 2 TIMES A DAY, Disp: , Rfl:   •  albuterol (PROVENTIL HFA,VENTOLIN HFA) 90 mcg/act inhaler, Inhale 2 puffs every 6 (six) hours as needed for wheezing, Disp: 1 Inhaler, Rfl: 5  •  atorvastatin (LIPITOR) 20 mg tablet, Take 1 tablet (20 mg total) by mouth daily at bedtime, Disp: 90 tablet, Rfl: 2  •  buPROPion (WELLBUTRIN XL) 150 mg 24 hr tablet, Take 150 mg by mouth every morning, Disp: , Rfl:   •  gabapentin (NEURONTIN) 100 mg capsule, Take 3 capsules (300 mg total) by mouth daily at bedtime, Disp: 270 capsule, Rfl: 5  •  glucagon 1 MG injection, Inject as directed, Disp: , Rfl:   •  glucose blood test strip, by In Vitro route 4 (four) times a day, Disp: , Rfl:   •  levothyroxine 75 mcg tablet, Take 1 tablet (75 mcg total) by mouth daily in the early morning, Disp: 90 tablet, Rfl: 2  •  lisinopril (ZESTRIL) 2 5 mg tablet, Take 1 tablet (2 5 mg total) by mouth daily, Disp: 90 tablet, Rfl: 2  •  metFORMIN (GLUCOPHAGE-XR) 750 mg 24 hr tablet, Take 1 tablet (750 mg total) by mouth 2 (two) times a day before breakfast and lunch (Patient taking differently: Take 750 mg by mouth Daily at 2am), Disp: , Rfl:   •  montelukast (SINGULAIR) 10 mg tablet, Take 1 tablet (10 mg total) by mouth daily at bedtime, Disp: 90 tablet, Rfl: 1  •  phentermine 37 5 MG capsule, Take 37 5 mg by mouth every morning, Disp: , Rfl:   •  SUMAtriptan (IMITREX) 25 mg tablet, Take 1 tablet (25 mg total) by mouth once as needed for migraine for up to 30 doses, Disp: 30 tablet, Rfl: 2  •  topiramate (TOPAMAX) 50 MG tablet, Take 1 tablet (50 mg total) by mouth 2 (two) times a day, Disp: 180 tablet, Rfl: 1  •  TRULICITY 1 5 GG/3 5WO SOPN, INJECT SUBCUTANEOUSLY 1 5MG EVERY WEEK, Disp: , Rfl: 1    ACTIVE PROBLEM LIST:  Patient Active Problem List   Diagnosis   • Abdominal pain, chronic, epigastric   • Abnormal mammogram   • Aftercare following surgery of the genitourinary system   • Asthma, mild intermittent   • Benign essential hypertension   • Borderline abnormal thyroid function test   • Diabetes ([place] [address] )   • Diabetes mellitus with proteinuria (HCC)   • Dysplastic nevi   • Headache   • Hyperproteinemia   • Menopausal symptoms   • Neuropathy, peripheral   • Nonalcoholic fatty liver disease   • Numbness and tingling in right hand   • Obesity   • Overactive bladder   • Transient ischemic attack   • Type 2 diabetes mellitus with microalbuminuria (HCC)   • Vitamin D deficiency   • Urinary, incontinence, stress female   • Elevated alkaline phosphatase level   • Persistent proteinuria   • Incomplete emptying of bladder   • Family history of MI (myocardial infarction)   • Low serum HDL   • Hyperlipidemia, unspecified   • Multiple benign nevi   • Paresthesia   • Prolapse of female pelvic organs   • Vagina bleeding   • Word finding difficulty   • Hematuria   • Syncope   • Migraine without aura and without status migrainosus, not intractable   • Lump in neck       PAST MEDICAL HISTORY:  Past Medical History:   Diagnosis Date   • Angina pectoris ([place] [address] )    • Anxiety    • Atypical chest pain     Last Assessed: 9/3/2014   • Diabetes mellitus ([place] [address] )    • Disease of thyroid gland    • Endometriosis     Last Assessed:9/13/2016    • Fatty liver    • History of transfusion 2018   • Hypercalcemia     Last Assessed: 4/27/2017   • Hyperlipidemia    • Hyponatremia     Last assessed: 8/17/2016   • Metrorrhagia     Last Assessed: 9/3/2014   • Microscopic hematuria     Last Assessed: 9/14/2016   • Obesity    • PONV (postoperative nausea and vomiting)    • Proteinuria    • Shortness of breath    • TIA (transient ischemic attack)        PAST SURGICAL HISTORY:  Past Surgical History:   Procedure Laterality Date   • HYSTERECTOMY      Resolved: 12/2013   • LAPAROSCOPIC CHOLECYSTECTOMY      Last Assessed: 10/17/2017   • OOPHORECTOMY      Last Assessed: 7/6/2016   • OVARIAN CYST REMOVAL Right    • MI CYSTOURETHROSCOPY,FULGUR <0 5 CM CHUYITA N/A 03/23/2017    Procedure: CYSTOSCOPY; BLADDER BIOPSY; Carole Trejo ;  Surgeon: Lian Tai MD;  Location: [facility];  Service: Urology   • SCALP EXCISION N/A 10/25/2022    Procedure: EXCISION LESION/MASS SCALP;  Surgeon: Karla Oliver MD;  Location: [facility];  Service: General   • TUBAL LIGATION         FAMILY HISTORY:  Family History   Problem Relation Age of Onset   • Diabetes Mother    • Hyperlipidemia Mother    • Hypertension Mother    • Heart disease Mother    • Anxiety disorder Mother    • Arthritis Mother    • Depression Mother    • Heart attack Mother    • Hyperlipidemia Father    • Hypertension Father    • Alcohol abuse Father    • Diabetes Sister    • Asthma Sister    • Melanoma Sister    • Breast cancer Sister    • Depression Son    • Anxiety disorder Daughter    • Colon cancer Maternal Grandmother    • Colon cancer Paternal Grandmother    • Arthritis Maternal Aunt    • Depression Maternal Aunt    • Diabetes Maternal Aunt    • Hypertension Maternal Aunt    • Breast cancer Maternal Aunt    • Diabetes Maternal Uncle    • Arthritis Paternal Aunt    • Diabetes Paternal [de-identified]    • Hypertension Paternal Aunt    • Breast cancer Paternal Aunt    • Diabetes Paternal Uncle    • Arthritis Cousin    • Hypertension Cousin    • Substance Abuse Cousin    • Depression Child        SOCIAL HISTORY:  Social History     Socioeconomic History   • Marital status:      Spouse name: Not on file   • Number of children: Not on file   • Years of education: Not on file   • Highest education level: Not on file   Occupational History   • Not on file   Tobacco Use   • Smoking status: Never   • Smokeless tobacco: Never   Vaping Use   • Vaping Use: Never used   Substance and Sexual Activity   • Alcohol use: Yes     Comment: occ   • Drug use: No   • Sexual activity: Not Currently   Other Topics Concern   • Not on file   Social History Narrative    Caffeine use     Social Determinants of Health     Financial Resource Strain: Not on file   Food Insecurity: Not on file   Transportation Needs: Not on file   Physical Activity: Not on file   Stress: Not on file   Social Connections: Not on file   Intimate Partner Violence: Not on file   Housing Stability: Not on file       Review of Systems   Psychiatric/Behavioral: Positive for decreased concentration and dysphoric mood  The patient is nervous/anxious  All other systems reviewed and are negative  Objective:  Vitals:    11/30/22 0811   BP: 124/86   BP Location: Left arm   Patient Position: Sitting   Pulse: 82   Resp: 12   SpO2: 99%   Weight: 69 9 kg (154 lb)   Height: 5' 3" (1 6 m)     Body mass index is 27 28 kg/m²  Physical Exam  Vitals and nursing note reviewed  Constitutional:       Appearance: Normal appearance  HENT:      Head: Normocephalic and atraumatic  Cardiovascular:      Rate and Rhythm: Normal rate and regular rhythm  Heart sounds: Normal heart sounds  Pulmonary:      Effort: Pulmonary effort is normal       Breath sounds: Normal breath sounds  Abdominal:      Palpations: Abdomen is soft  Musculoskeletal:         General: Normal range of motion  Cervical back: Normal range of motion  Right lower leg: No edema  Left lower leg: No edema  Lymphadenopathy:      Cervical: No cervical adenopathy  Skin:     General: Skin is warm and dry  Neurological:      General: No focal deficit present  Mental Status: She is alert  Mental status is at baseline  Psychiatric:         Mood and Affect: Mood is anxious and depressed  Affect is tearful           Behavior: Behavior normal            RESULTS:    Recent Results (from the past 1008 hour(s))   Fingerstick Glucose (POCT)    Collection Time: 10/25/22  7:23 AM   Result Value Ref Range    POC Glucose 93 65 - 140 mg/dl   Tissue Exam    Collection Time: 10/25/22  8:23 AM   Result Value Ref Range    Case Report       Surgical Pathology Report                         Case: [id number] Authorizing Provider:  Lanie Figueroa MD       Collected:           10/25/2022 4039              Ordering Location:     [facility] Received:            10/25/2022 1135                                     Operating Room                                                               Pathologist:           Cruz Renae MD                                                           Specimen:    Cyst, Scalp Cyst                                                                           Final Diagnosis       A  Scalp, "Scalp cyst," Resection:  - Consistent with pilar cyst  - Negative for dysplasia or carcinoma       Additional Information       All reported additional testing was performed with appropriately reactive controls  These tests were developed and their performance characteristics determined by [facility] or appropriate performing facility, though some tests may be performed on tissues which have not been validated for performance characteristics (such as staining performed on alcohol exposed cell blocks and decalcified tissues)  Results should be interpreted with caution and in the context of the patients’ clinical condition  These tests may not be cleared or approved by the [place]  Food and Drug Administration, though the FDA has determined that such clearance or approval is not necessary  These tests are used for clinical purposes and they should not be regarded as investigational or for research  This laboratory has been approved by [facility], designated as a high-complexity laboratory and is qualified to perform these tests   Interpretation performed at [address]        Gross Description          A  The specimen is received in formalin, labeled with the patient's name and hospital number, and is designated "scalp cyst"    The specimen consists of a single tan-purple focally fragmented cystic structure measuring 1 0 x 0 8 x 0 8 cm   The specimen is bisected revealing pink-purple, hemorrhagic cut surfaces  Entirely submitted  One cassette  Note: The estimated total formalin fixation time based upon information provided by the submitting clinician and the standard processing schedule is under 72 hours  Helen Gale        Clinical Information Scalp Cyst        This note was created with voice recognition software  Phonic, grammatical and spelling errors may be present within the note as a result

## 2022-12-01 ENCOUNTER — OFFICE VISIT (OUTPATIENT)
Dept: SURGERY | Facility: CLINIC | Age: 47
End: 2022-12-01

## 2022-12-01 VITALS
OXYGEN SATURATION: 99 % | HEART RATE: 84 BPM | BODY MASS INDEX: 27.29 KG/M2 | HEIGHT: 63 IN | WEIGHT: 154 LBS | SYSTOLIC BLOOD PRESSURE: 128 MMHG | DIASTOLIC BLOOD PRESSURE: 90 MMHG

## 2022-12-01 DIAGNOSIS — Z09 POSTOP CHECK: Primary | ICD-10-CM

## 2022-12-01 NOTE — PROGRESS NOTES
Assessment/Plan:    Pathology Results:  Final Diagnosis   A  Scalp, "Scalp cyst," Resection:  - Consistent with pilar cyst  - Negative for dysplasia or carcinoma           1  Postop check      incision healed  Path reviewed  F/u prn  Subjective:      Patient ID: Naveen Thompson is a 56 y o  female  Triage Notes:    HPI    The following portions of the patient's history were reviewed and updated as appropriate: allergies, current medications, past family history, past medical history, past social history, past surgical history and problem list     Review of Systems      Objective:      /90 (BP Location: Left arm, Patient Position: Sitting, Cuff Size: Adult)   Pulse 84   Ht 5' 3" (1 6 m)   Wt 69 9 kg (154 lb)   SpO2 99%   BMI 27 28 kg/m²     Below is the patient's most recent value for Albumin, ALT, AST, BUN, Calcium, Chloride, Cholesterol, CO2, Creatinine, GFR, Glucose, HDL, Hematocrit, Hemoglobin, Hemoglobin A1C, LDL, Magnesium, Phosphorus, Platelets, Potassium, PSA, Sodium, Triglycerides, and WBC  Lab Results   Component Value Date    ALT 35 05/09/2022    AST 16 05/09/2022    BUN 13 05/09/2022    CALCIUM 9 8 05/09/2022     (H) 05/09/2022    CO2 24 05/09/2022    CREATININE 0 90 05/09/2022    HDL 42 (L) 05/09/2022    HCT 44 0 05/09/2022    HGB 14 3 05/09/2022    HGBA1C 5 8 (H) 08/09/2022    MG 1 9 08/10/2020    PHOS 3 4 09/09/2016     05/09/2022    K 3 8 05/09/2022    TRIG 61 05/09/2022    WBC 7 37 05/09/2022     Note: for a comprehensive list of the patient's lab results, access the Results Review activity  Physical Exam  HENT:      Head:      Comments: Incision clean/dry/intact  No erythema or drainage               Procedures

## 2023-01-26 ENCOUNTER — OFFICE VISIT (OUTPATIENT)
Dept: INTERNAL MEDICINE CLINIC | Facility: CLINIC | Age: 48
End: 2023-01-26

## 2023-01-26 VITALS
SYSTOLIC BLOOD PRESSURE: 118 MMHG | BODY MASS INDEX: 27.25 KG/M2 | RESPIRATION RATE: 16 BRPM | HEART RATE: 82 BPM | WEIGHT: 153.8 LBS | OXYGEN SATURATION: 98 % | HEIGHT: 63 IN | DIASTOLIC BLOOD PRESSURE: 84 MMHG

## 2023-01-26 DIAGNOSIS — E11.29 DIABETES MELLITUS WITH PROTEINURIA (HCC): ICD-10-CM

## 2023-01-26 DIAGNOSIS — E11.21 TYPE 2 DIABETES MELLITUS WITH DIABETIC NEPHROPATHY, WITHOUT LONG-TERM CURRENT USE OF INSULIN (HCC): Primary | ICD-10-CM

## 2023-01-26 DIAGNOSIS — R30.0 DYSURIA: ICD-10-CM

## 2023-01-26 DIAGNOSIS — R80.9 DIABETES MELLITUS WITH PROTEINURIA (HCC): ICD-10-CM

## 2023-01-26 LAB
LEFT EYE DIABETIC RETINOPATHY: NORMAL
LEFT EYE IMAGE QUALITY: NORMAL
LEFT EYE MACULAR EDEMA: NORMAL
LEFT EYE OTHER RETINOPATHY: NORMAL
RIGHT EYE DIABETIC RETINOPATHY: NORMAL
RIGHT EYE IMAGE QUALITY: NORMAL
RIGHT EYE MACULAR EDEMA: NORMAL
RIGHT EYE OTHER RETINOPATHY: NORMAL
SEVERITY (EYE EXAM): NORMAL
SL AMB POCT UR MICROALBUMIN: NORMAL

## 2023-01-26 RX ORDER — NITROFURANTOIN 25; 75 MG/1; MG/1
100 CAPSULE ORAL 2 TIMES DAILY
Qty: 10 CAPSULE | Refills: 0 | Status: SHIPPED | OUTPATIENT
Start: 2023-01-26 | End: 2023-01-31

## 2023-01-26 RX ORDER — METFORMIN HYDROCHLORIDE 750 MG/1
750 TABLET, EXTENDED RELEASE ORAL
Status: SHIPPED
Start: 2023-01-26

## 2023-01-26 NOTE — PROGRESS NOTES
Assessment/Plan:     Vinicio Del Castillo is here for one month follow up; still very much grieving understandingly; she is seeing a therapist; is depressed; but working through it  Knows to call me with whatever she needs  Has dysuria;     Quality Measures:     BMI Counseling: There is no height or weight on file to calculate BMI  The BMI is above normal  Nutrition recommendations include decreasing portion sizes and encouraging healthy choices of fruits and vegetables  Exercise recommendations include moderate physical activity 150 minutes/week  Rationale for BMI follow-up plan is due to patient being overweight or obese  Depression Screening and Follow-up Plan: Clincally patient does not have depression  No treatment is required  Return in about 6 months (around 7/26/2023)  No problem-specific Assessment & Plan notes found for this encounter  Diagnoses and all orders for this visit:    Type 2 diabetes mellitus with diabetic nephropathy, without long-term current use of insulin (HCC)  -     IRIS Diabetic eye exam  -     POCT urine microalbumin/creatinine    Diabetes mellitus with proteinuria (HCC)  -     metFORMIN (GLUCOPHAGE-XR) 750 mg 24 hr tablet; Take 1 tablet (750 mg total) by mouth Daily at 2am    Dysuria  -     nitrofurantoin (MACROBID) 100 mg capsule; Take 1 capsule (100 mg total) by mouth 2 (two) times a day for 5 days          Subjective:      Patient ID: Ruben June is a 56 y o  female  Here for follow up        ALLERGIES:  Allergies   Allergen Reactions   • Cephalosporins Anaphylaxis   • Norflex [Orphenadrine] Anaphylaxis   • Rocephin [Ceftriaxone] Anaphylaxis   • Sulfa Antibiotics Hives   • Ultracet [Tramadol-Acetaminophen] Hives       CURRENT MEDICATIONS:    Current Outpatient Medications:   •  ACCU-CHEK FASTCLIX LANCETS MISC, TEST 2 TIMES A DAY, Disp: , Rfl:   •  albuterol (PROVENTIL HFA,VENTOLIN HFA) 90 mcg/act inhaler, Inhale 2 puffs every 6 (six) hours as needed for wheezing, Disp: 1 Inhaler, Rfl: 5  •  atorvastatin (LIPITOR) 20 mg tablet, Take 1 tablet (20 mg total) by mouth daily at bedtime, Disp: 90 tablet, Rfl: 2  •  buPROPion (WELLBUTRIN XL) 150 mg 24 hr tablet, Take 150 mg by mouth every morning, Disp: , Rfl:   •  gabapentin (NEURONTIN) 100 mg capsule, Take 3 capsules (300 mg total) by mouth daily at bedtime, Disp: 270 capsule, Rfl: 5  •  glucagon 1 MG injection, Inject as directed, Disp: , Rfl:   •  glucose blood test strip, by In Vitro route 4 (four) times a day, Disp: , Rfl:   •  levothyroxine 75 mcg tablet, Take 1 tablet (75 mcg total) by mouth daily in the early morning, Disp: 90 tablet, Rfl: 2  •  lisinopril (ZESTRIL) 2 5 mg tablet, Take 1 tablet (2 5 mg total) by mouth daily, Disp: 90 tablet, Rfl: 2  •  metFORMIN (GLUCOPHAGE-XR) 750 mg 24 hr tablet, Take 1 tablet (750 mg total) by mouth Daily at 2am, Disp: , Rfl:   •  montelukast (SINGULAIR) 10 mg tablet, Take 1 tablet (10 mg total) by mouth daily at bedtime, Disp: 90 tablet, Rfl: 1  •  nitrofurantoin (MACROBID) 100 mg capsule, Take 1 capsule (100 mg total) by mouth 2 (two) times a day for 5 days, Disp: 10 capsule, Rfl: 0  •  phentermine 37 5 MG capsule, Take 37 5 mg by mouth every morning, Disp: , Rfl:   •  SUMAtriptan (IMITREX) 25 mg tablet, Take 1 tablet (25 mg total) by mouth once as needed for migraine for up to 30 doses, Disp: 30 tablet, Rfl: 2  •  topiramate (TOPAMAX) 50 MG tablet, Take 1 tablet (50 mg total) by mouth 2 (two) times a day, Disp: 180 tablet, Rfl: 1  •  TRULICITY 1 5 AF/6 8GL SOPN, INJECT SUBCUTANEOUSLY 1 5MG EVERY WEEK, Disp: , Rfl: 1    ACTIVE PROBLEM LIST:  Patient Active Problem List   Diagnosis   • Abdominal pain, chronic, epigastric   • Abnormal mammogram   • Aftercare following surgery of the genitourinary system   • Asthma, mild intermittent   • Benign essential hypertension   • Borderline abnormal thyroid function test   • Diabetes (HCC)   • Diabetes mellitus with proteinuria (HCC)   • Dysplastic nevi   • Hyperproteinemia   • Menopausal symptoms   • Neuropathy, peripheral   • Nonalcoholic fatty liver disease   • Numbness and tingling in right hand   • Obesity   • Overactive bladder   • Type 2 diabetes mellitus with microalbuminuria (HCC)   • Vitamin D deficiency   • Urinary, incontinence, stress female   • Persistent proteinuria   • Family history of MI (myocardial infarction)   • Hyperlipidemia, unspecified   • Multiple benign nevi   • Paresthesia   • Prolapse of female pelvic organs   • Vagina bleeding   • Word finding difficulty   • Hematuria   • Syncope   • Migraine without aura and without status migrainosus, not intractable       PAST MEDICAL HISTORY:  Past Medical History:   Diagnosis Date   • [facility]    • Anxiety    • Asthma    • Atypical chest pain     Last Assessed: 9/3/2014   • Diabetes mellitus ([facility] )    • Disease of thyroid gland    • Endometriosis     Last Assessed:9/13/2016    • Fatty liver    • History of transfusion 2018   • Hypercalcemia     Last Assessed: 4/27/2017   • Hyperlipidemia    • Hyponatremia     Last assessed: 8/17/2016   • Metrorrhagia     Last Assessed: 9/3/2014   • Microscopic hematuria     Last Assessed: 9/14/2016   • Obesity    • PONV (postoperative nausea and vomiting)    • Proteinuria    • Shortness of breath    • TIA (transient ischemic attack)        PAST SURGICAL HISTORY:  Past Surgical History:   Procedure Laterality Date   • HYSTERECTOMY      Resolved: 12/2013   • LAPAROSCOPIC CHOLECYSTECTOMY      Last Assessed: 10/17/2017   • OOPHORECTOMY      Last Assessed: 7/6/2016   • OVARIAN CYST REMOVAL Right    • [place] CYSTO W/REMOVAL OF LESIONS SMALL N/A 03/23/2017    Procedure: Negin Mcfadden; BLADDER BIOPSY; Red Frizzle ;  Surgeon: Etelvina Cortes MD;  Location: [facility];  Service: Urology   • SCALP EXCISION N/A 10/25/2022    Procedure: EXCISION LESION/MASS SCALP;  Surgeon: Roc Muro MD;  Location: [facility];  Service: General   • TUBAL LIGATION         FAMILY HISTORY:  Family History   Problem Relation Age of Onset   • Diabetes Mother    • Hyperlipidemia Mother    • Hypertension Mother    • Heart disease Mother    • Anxiety disorder Mother    • Arthritis Mother    • Depression Mother    • Heart attack Mother    • Hyperlipidemia Father    • Hypertension Father    • Alcohol abuse Father    • Colon cancer Father    • Diabetes Sister    • Asthma Sister    • Melanoma Sister    • Breast cancer Sister    • Depression Son    • Anxiety disorder Daughter    • Colon cancer Maternal Grandmother    • Colon cancer Paternal Grandmother    • Arthritis Maternal Aunt    • Depression Maternal Aunt    • Diabetes Maternal Aunt    • Hypertension Maternal Aunt    • Breast cancer Maternal Aunt    • Diabetes Maternal Uncle    • Arthritis Paternal Aunt    • Diabetes Paternal [de-identified]    • Hypertension Paternal Aunt    • Breast cancer Paternal Aunt    • Diabetes Paternal Uncle    • Arthritis Cousin    • Hypertension Cousin    • Substance Abuse Cousin    • Depression Child    • Breast cancer Maternal Aunt    • Colon cancer Paternal Uncle        SOCIAL HISTORY:  Social History     Socioeconomic History   • Marital status:      Spouse name: Not on file   • Number of children: Not on file   • Years of education: Not on file   • Highest education level: Not on file   Occupational History   • Not on file   Tobacco Use   • Smoking status: Never   • Smokeless tobacco: Never   Vaping Use   • Vaping Use: Never used   Substance and Sexual Activity   • Alcohol use: Yes     Comment: occ   • Drug use: Never   • Sexual activity: Not Currently     Partners: Male     Birth control/protection: Abstinence   Other Topics Concern   • Not on file   Social History Narrative    Caffeine use     Social Determinants of Health     Financial Resource Strain: Not on file   Food Insecurity: Not on file   Transportation Needs: Not on file   Physical Activity: Not on file   Stress: Not on file Social Connections: Not on file   Intimate Partner Violence: Not on file   Housing Stability: Not on file       Review of Systems   All other systems reviewed and are negative  Objective:  Vitals:    23 1454   BP: 118/84   BP Location: Left arm   Patient Position: Sitting   Cuff Size: Adult   Pulse: 82   Resp: 16   SpO2: 98%   Weight: 69 8 kg (153 lb 12 8 oz)   Height: 5' 3" (1 6 m)     Body mass index is 27 24 kg/m²  Physical Exam  Vitals and nursing note reviewed  Constitutional:       Appearance: Normal appearance  Cardiovascular:      Rate and Rhythm: Normal rate  Musculoskeletal:         General: Normal range of motion  Cervical back: Normal range of motion  Right lower leg: No edema  Left lower leg: No edema  Skin:     General: Skin is warm and dry  Neurological:      General: No focal deficit present  Mental Status: She is alert and oriented to person, place, and time  Mental status is at baseline  Psychiatric:         Mood and Affect: Mood normal            RESULTS:    Recent Results (from the past 1008 hour(s))   POCT urine microalbumin/creatinine    Collection Time: 23  3:06 PM   Result Value Ref Range    UR MICROALBUMIN 10 mg/L    IRIS Diabetic eye exam    Collection Time: 23  3:29 PM   Result Value Ref Range    Severity NORMAL     Right Eye Diabetic Retinopathy None     Right Eye Macular Edema None     Right Eye Other Retinopathy None     Right Eye Image Quality Gradable Image     Left Eye Diabetic Retinopathy None     Left Eye Macular Edema None     Left Eye Other Retinopathy None     Left Eye Image Quality Gradable Image     Result Retinal Study Result for [facility]     Result       Result        [facility], a 55 y/o, F (: 1975, MRN: [id number])    Result        [facility] to [address] [place] [address] [facility] for a retinal imaging study of the left and right eyes      Result       Result        Based on the findings of the study, the following is recommended for [facility]    Result        Normal Study: Re-scan the patient in 12 months or in the next calendar year  Result       Result        Interpreting Provider's Comments:  No comments provided    Result        Diagnoses Present:  - Type 2 diabetes mellitus with diabetic nephropathy    Result       Result        Right eye findings: Negative for Diabetic Retinopathy    Result Negative for Macular Edema     Result       Result        Left eye findings: Negative for Diabetic Retinopathy    Result Negative for Macular Edema     Result       Result        This result was electronically signed by Nitish Knight MD, NPI: [id number], Taxonomy: [id number] on 01- 22:44 [facility]  Result      Result       NOTE:  Any pathology noted on this diabetic retinal evaluation should be confirmed by an appropriate ophthalmic examination  This note was created with voice recognition software  Phonic, grammatical and spelling errors may be present within the note as a result

## 2023-05-08 DIAGNOSIS — I10 BENIGN ESSENTIAL HYPERTENSION: ICD-10-CM

## 2023-05-08 DIAGNOSIS — J45.20 MILD INTERMITTENT ASTHMA, UNSPECIFIED WHETHER COMPLICATED: ICD-10-CM

## 2023-05-08 DIAGNOSIS — R80.9 TYPE 2 DIABETES MELLITUS WITH MICROALBUMINURIA, WITHOUT LONG-TERM CURRENT USE OF INSULIN (HCC): ICD-10-CM

## 2023-05-08 DIAGNOSIS — N30.01 ACUTE CYSTITIS WITH HEMATURIA: ICD-10-CM

## 2023-05-08 DIAGNOSIS — G62.9 PERIPHERAL POLYNEUROPATHY: ICD-10-CM

## 2023-05-08 DIAGNOSIS — E66.9 OBESITY WITH SERIOUS COMORBIDITY, UNSPECIFIED CLASSIFICATION, UNSPECIFIED OBESITY TYPE: ICD-10-CM

## 2023-05-08 DIAGNOSIS — E11.29 TYPE 2 DIABETES MELLITUS WITH MICROALBUMINURIA, WITHOUT LONG-TERM CURRENT USE OF INSULIN (HCC): ICD-10-CM

## 2023-05-08 DIAGNOSIS — N39.3 URINARY, INCONTINENCE, STRESS FEMALE: ICD-10-CM

## 2023-05-08 RX ORDER — GABAPENTIN 100 MG/1
300 CAPSULE ORAL
Qty: 270 CAPSULE | Refills: 5 | Status: SHIPPED | OUTPATIENT
Start: 2023-05-08 | End: 2023-08-06

## 2023-07-27 ENCOUNTER — OFFICE VISIT (OUTPATIENT)
Dept: INTERNAL MEDICINE CLINIC | Facility: CLINIC | Age: 48
End: 2023-07-27
Payer: COMMERCIAL

## 2023-07-27 VITALS
HEIGHT: 63 IN | OXYGEN SATURATION: 98 % | WEIGHT: 158 LBS | SYSTOLIC BLOOD PRESSURE: 118 MMHG | DIASTOLIC BLOOD PRESSURE: 60 MMHG | HEART RATE: 97 BPM | BODY MASS INDEX: 28 KG/M2

## 2023-07-27 DIAGNOSIS — N81.10 CYSTOCELE WITH RECTOCELE: ICD-10-CM

## 2023-07-27 DIAGNOSIS — Z12.31 SCREENING MAMMOGRAM FOR BREAST CANCER: ICD-10-CM

## 2023-07-27 DIAGNOSIS — N81.6 CYSTOCELE WITH RECTOCELE: ICD-10-CM

## 2023-07-27 DIAGNOSIS — R80.9 TYPE 2 DIABETES MELLITUS WITH MICROALBUMINURIA, WITHOUT LONG-TERM CURRENT USE OF INSULIN (HCC): Primary | ICD-10-CM

## 2023-07-27 DIAGNOSIS — E11.29 TYPE 2 DIABETES MELLITUS WITH MICROALBUMINURIA, WITHOUT LONG-TERM CURRENT USE OF INSULIN (HCC): Primary | ICD-10-CM

## 2023-07-27 DIAGNOSIS — N32.81 OAB (OVERACTIVE BLADDER): ICD-10-CM

## 2023-07-27 PROBLEM — R31.9 HEMATURIA: Status: RESOLVED | Noted: 2019-11-20 | Resolved: 2023-07-27

## 2023-07-27 PROBLEM — N95.1 MENOPAUSAL SYMPTOMS: Status: RESOLVED | Noted: 2017-12-05 | Resolved: 2023-07-27

## 2023-07-27 PROBLEM — R55 SYNCOPE: Status: RESOLVED | Noted: 2019-11-20 | Resolved: 2023-07-27

## 2023-07-27 PROBLEM — R20.2 PARESTHESIA: Status: RESOLVED | Noted: 2019-11-20 | Resolved: 2023-07-27

## 2023-07-27 PROBLEM — R47.89 WORD FINDING DIFFICULTY: Status: RESOLVED | Noted: 2019-11-20 | Resolved: 2023-07-27

## 2023-07-27 PROBLEM — N93.9 VAGINA BLEEDING: Status: RESOLVED | Noted: 2019-11-20 | Resolved: 2023-07-27

## 2023-07-27 PROCEDURE — 99214 OFFICE O/P EST MOD 30 MIN: CPT | Performed by: INTERNAL MEDICINE

## 2023-07-27 RX ORDER — OXYBUTYNIN CHLORIDE 5 MG/1
5 TABLET ORAL
Qty: 90 TABLET | Refills: 3 | Status: SHIPPED | OUTPATIENT
Start: 2023-07-27 | End: 2023-10-25

## 2023-07-27 NOTE — PROGRESS NOTES
Assessment/Plan:     Patient is here for routine f/u; reports doing well except having some stress and urge incontinence and gets up in the middle of the night to urinate, has to wear panty liners, leaks when she sneezes; she has a PMH for cystocele with rectocele, has seen Uro GYN in the past, consult placed; she is doing Kegel exercises; offered pelvic floor therapy; will try oxybutynin; Follows with endo for DM; on statin, metformin, trulicity; levothyroxine for hypothyroidism. Quality Measures:     BMI Counseling: There is no height or weight on file to calculate BMI. The BMI is above normal. Nutrition recommendations include decreasing portion sizes and encouraging healthy choices of fruits and vegetables. Exercise recommendations include moderate physical activity 150 minutes/week. Rationale for BMI follow-up plan is due to patient being overweight or obese. Depression Screening and Follow-up Plan: Clincally patient does not have depression. No treatment is required. Return in about 6 months (around 1/27/2024). No problem-specific Assessment & Plan notes found for this encounter. Diagnoses and all orders for this visit:    Type 2 diabetes mellitus with microalbuminuria, without long-term current use of insulin (HCC)  -     CBC and differential; Future  -     TSH, 3rd generation with Free T4 reflex; Future  -     Comprehensive metabolic panel; Future  -     Hemoglobin A1C; Future  -     Lipid Panel with Direct LDL reflex; Future  -     Albumin / creatinine urine ratio; Future    Screening mammogram for breast cancer  -     Mammo screening bilateral w 3d & cad; Future    Cystocele with rectocele  -     Ambulatory Referral to Urogynecology; Future    OAB (overactive bladder)  -     oxybutynin (DITROPAN) 5 mg tablet; Take 1 tablet (5 mg total) by mouth daily at bedtime          Subjective:      Patient ID: Alexandria Lawrence is a 56 y.o. female.     Here for routine f/u.      ALLERGIES:  Allergies   Allergen Reactions   • Cephalosporins Anaphylaxis   • Norflex [Orphenadrine] Anaphylaxis   • Rocephin [Ceftriaxone] Anaphylaxis   • Sulfa Antibiotics Hives   • Ultracet [Tramadol-Acetaminophen] Hives       CURRENT MEDICATIONS:    Current Outpatient Medications:   •  ACCU-CHEK FASTCLIX LANCETS MISC, TEST 2 TIMES A DAY, Disp: , Rfl:   •  albuterol (PROVENTIL HFA,VENTOLIN HFA) 90 mcg/act inhaler, Inhale 2 puffs every 6 (six) hours as needed for wheezing, Disp: 1 Inhaler, Rfl: 5  •  atorvastatin (LIPITOR) 20 mg tablet, Take 1 tablet (20 mg total) by mouth daily at bedtime, Disp: 90 tablet, Rfl: 2  •  buPROPion (WELLBUTRIN XL) 150 mg 24 hr tablet, Take 150 mg by mouth every morning, Disp: , Rfl:   •  gabapentin (NEURONTIN) 100 mg capsule, Take 3 capsules (300 mg total) by mouth daily at bedtime, Disp: 270 capsule, Rfl: 5  •  glucagon 1 MG injection, Inject as directed, Disp: , Rfl:   •  glucose blood test strip, by In Vitro route 4 (four) times a day, Disp: , Rfl:   •  levothyroxine 75 mcg tablet, Take 1 tablet (75 mcg total) by mouth daily in the early morning, Disp: 90 tablet, Rfl: 2  •  lisinopril (ZESTRIL) 2.5 mg tablet, Take 1 tablet (2.5 mg total) by mouth daily, Disp: 90 tablet, Rfl: 2  •  metFORMIN (GLUCOPHAGE-XR) 750 mg 24 hr tablet, Take 1 tablet (750 mg total) by mouth Daily at 2am, Disp: , Rfl:   •  montelukast (SINGULAIR) 10 mg tablet, Take 1 tablet (10 mg total) by mouth daily at bedtime, Disp: 90 tablet, Rfl: 1  •  oxybutynin (DITROPAN) 5 mg tablet, Take 1 tablet (5 mg total) by mouth daily at bedtime, Disp: 90 tablet, Rfl: 3  •  phentermine 37.5 MG capsule, Take 37.5 mg by mouth every morning, Disp: , Rfl:   •  SUMAtriptan (IMITREX) 25 mg tablet, Take 1 tablet (25 mg total) by mouth once as needed for migraine for up to 30 doses, Disp: 30 tablet, Rfl: 2  •  topiramate (TOPAMAX) 50 MG tablet, Take 1 tablet (50 mg total) by mouth 2 (two) times a day, Disp: 180 tablet, Rfl: 1  •  TRULICITY 1.5 CV/5.1BD SOPN, INJECT SUBCUTANEOUSLY 1.5MG EVERY WEEK, Disp: , Rfl: 1    ACTIVE PROBLEM LIST:  Patient Active Problem List   Diagnosis   • Abdominal pain, chronic, epigastric   • Abnormal mammogram   • Aftercare following surgery of the genitourinary system   • Asthma, mild intermittent   • Benign essential hypertension   • Borderline abnormal thyroid function test   • Diabetes ([address])   • Diabetes mellitus with proteinuria (HCC)   • Dysplastic nevi   • Neuropathy, peripheral   • Nonalcoholic fatty liver disease   • Numbness and tingling in right hand   • Obesity   • Overactive bladder   • Type 2 diabetes mellitus with microalbuminuria (HCC)   • Vitamin D deficiency   • Urinary, incontinence, stress female   • Persistent proteinuria   • Family history of MI (myocardial infarction)   • Hyperlipidemia, unspecified   • Multiple benign nevi   • Prolapse of female pelvic organs   • Migraine without aura and without status migrainosus, not intractable       PAST MEDICAL HISTORY:  Past Medical History:   Diagnosis Date   • Allergic    • Angina pectoris (HCC)    • Anxiety    • Asthma    • Atypical chest pain     Last Assessed: 9/3/2014   • Diabetes mellitus ([address])    • Disease of thyroid gland    • Endometriosis     Last Assessed:9/13/2016    • Fatty liver    • Headache(784.0)    • History of transfusion 2018   • Hypercalcemia     Last Assessed: 4/27/2017   • Hyperlipidemia    • Hyponatremia     Last assessed: 8/17/2016   • Kidney stone    • Metrorrhagia     Last Assessed: 9/3/2014   • Microscopic hematuria     Last Assessed: 9/14/2016   • Obesity    • PONV (postoperative nausea and vomiting)    • Proteinuria    • Shortness of breath    • TIA (transient ischemic attack)        PAST SURGICAL HISTORY:  Past Surgical History:   Procedure Laterality Date   • HYSTERECTOMY      Resolved: 12/2013   • LAPAROSCOPIC CHOLECYSTECTOMY      Last Assessed: 10/17/2017   • OOPHORECTOMY      Last Assessed: 7/6/2016   • OVARIAN CYST REMOVAL Right    • [place] CYSTO W/REMOVAL OF LESIONS SMALL N/A 03/23/2017    Procedure: CYSTOSCOPY; BLADDER BIOPSY; Dayanara Gillis ;  Surgeon: Karly Joseph MD;  Location: [facility];  Service: Urology   • SCALP EXCISION N/A 10/25/2022    Procedure: EXCISION LESION/MASS SCALP;  Surgeon: Frantz Rodas MD;  Location: [facility];  Service: General   • TUBAL LIGATION         FAMILY HISTORY:  Family History   Problem Relation Age of Onset   • Diabetes Mother    • Hyperlipidemia Mother    • Hypertension Mother    • Heart disease Mother    • Anxiety disorder Mother    • Arthritis Mother    • Depression Mother    • Heart attack Mother    • Stroke Mother    • Hyperlipidemia Father    • Hypertension Father    • Alcohol abuse Father    • Colon cancer Father    • Diabetes Sister    • Asthma Sister    • Melanoma Sister    • Breast cancer Sister    • Depression Son    • Anxiety disorder Daughter    • Colon cancer Maternal Grandmother    • Colon cancer Paternal Grandmother    • Arthritis Maternal Aunt    • Depression Maternal Aunt    • Diabetes Maternal Aunt    • Hypertension Maternal Aunt    • Breast cancer Maternal Aunt    • Diabetes Maternal Uncle    • Arthritis Paternal Aunt    • Diabetes Paternal Aunt    • Hypertension Paternal Aunt    • Breast cancer Paternal Aunt    • Vision loss Paternal Aunt    • Diabetes Paternal Uncle    • Vision loss Paternal Uncle    • Arthritis Cousin    • Hypertension Cousin    • Substance Abuse Cousin    • Depression Child    • Breast cancer Maternal Aunt    • Colon cancer Paternal Uncle    • Cancer Cousin        SOCIAL HISTORY:  Social History     Socioeconomic History   • Marital status:      Spouse name: Not on file   • Number of children: Not on file   • Years of education: Not on file   • Highest education level: Not on file   Occupational History   • Not on file   Tobacco Use   • Smoking status: Never   • Smokeless tobacco: Never   Vaping Use   • Vaping Use: Never used   Substance and Sexual Activity   • Alcohol use: Yes     Comment: occ   • Drug use: No   • Sexual activity: Not Currently     Partners: Male     Birth control/protection: Abstinence   Other Topics Concern   • Not on file   Social History Narrative    Caffeine use     Social Determinants of Health     Financial Resource Strain: Not on file   Food Insecurity: Not on file   Transportation Needs: Not on file   Physical Activity: Not on file   Stress: Not on file   Social Connections: Not on file   Intimate Partner Violence: Not on file   Housing Stability: Not on file       Review of Systems   Constitutional: Negative for chills and fever. HENT: Negative for ear pain and sore throat. Eyes: Negative for pain and visual disturbance. Respiratory: Negative for cough and shortness of breath. Cardiovascular: Negative for chest pain and palpitations. Gastrointestinal: Negative for abdominal pain and vomiting. Genitourinary: Positive for frequency (at night) and urgency. Negative for dysuria and hematuria. Urinary leakage especially sneezing   Musculoskeletal: Negative for arthralgias and back pain. Skin: Negative for color change and rash. Neurological: Negative for seizures and syncope. All other systems reviewed and are negative. Objective:  Vitals:    07/27/23 1406 07/27/23 1427   BP: 138/84 118/60   BP Location: Left arm    Patient Position: Sitting    Cuff Size: Adult    Pulse: 97    SpO2: 98%    Weight: 71.7 kg (158 lb)    Height: 5' 3" (1.6 m)      Body mass index is 27.99 kg/m². Physical Exam  Vitals and nursing note reviewed. Constitutional:       Appearance: Normal appearance. HENT:      Head: Normocephalic and atraumatic. Cardiovascular:      Rate and Rhythm: Normal rate and regular rhythm. Heart sounds: Normal heart sounds. Pulmonary:      Effort: Pulmonary effort is normal.      Breath sounds: Normal breath sounds.    Musculoskeletal:         General: Normal range of motion. Cervical back: Normal range of motion. Right lower leg: No edema. Left lower leg: No edema. Skin:     General: Skin is warm and dry. Neurological:      General: No focal deficit present. Mental Status: She is alert and oriented to person, place, and time. Mental status is at baseline. Psychiatric:         Mood and Affect: Mood normal.           RESULTS:    In chart    This note was created with voice recognition software. Phonic, grammatical and spelling errors may be present within the note as a result.

## 2023-10-13 ENCOUNTER — TELEPHONE (OUTPATIENT)
Dept: INTERNAL MEDICINE CLINIC | Facility: CLINIC | Age: 48
End: 2023-10-13

## 2023-10-13 DIAGNOSIS — R30.0 DYSURIA: Primary | ICD-10-CM

## 2023-10-13 NOTE — TELEPHONE ENCOUNTER
Trickling urine since a few days ago,  pain ,  frequency going. ...     Uti possibly,   are we willing to send anything in or do a urine to test?

## 2023-10-19 DIAGNOSIS — J45.909 UNCOMPLICATED ASTHMA, UNSPECIFIED ASTHMA SEVERITY, UNSPECIFIED WHETHER PERSISTENT: ICD-10-CM

## 2023-10-19 RX ORDER — MONTELUKAST SODIUM 10 MG/1
10 TABLET ORAL
Qty: 90 TABLET | Refills: 1 | Status: SHIPPED | OUTPATIENT
Start: 2023-10-19

## 2023-10-29 ENCOUNTER — OFFICE VISIT (OUTPATIENT)
Age: 48
End: 2023-10-29
Payer: COMMERCIAL

## 2023-10-29 VITALS
DIASTOLIC BLOOD PRESSURE: 82 MMHG | OXYGEN SATURATION: 100 % | RESPIRATION RATE: 20 BRPM | HEART RATE: 72 BPM | WEIGHT: 165 LBS | TEMPERATURE: 98.9 F | BODY MASS INDEX: 29.23 KG/M2 | SYSTOLIC BLOOD PRESSURE: 124 MMHG

## 2023-10-29 DIAGNOSIS — N30.01 ACUTE CYSTITIS WITH HEMATURIA: Primary | ICD-10-CM

## 2023-10-29 LAB
SL AMB  POCT GLUCOSE, UA: NEGATIVE
SL AMB LEUKOCYTE ESTERASE,UA: ABNORMAL
SL AMB POCT BILIRUBIN,UA: NEGATIVE
SL AMB POCT BLOOD,UA: ABNORMAL
SL AMB POCT CLARITY,UA: YELLOW
SL AMB POCT COLOR,UA: CLEAR
SL AMB POCT KETONES,UA: NEGATIVE
SL AMB POCT NITRITE,UA: NEGATIVE
SL AMB POCT PH,UA: 5
SL AMB POCT SPECIFIC GRAVITY,UA: 1.01
SL AMB POCT URINE PROTEIN: NEGATIVE
SL AMB POCT UROBILINOGEN: NEGATIVE

## 2023-10-29 PROCEDURE — 87086 URINE CULTURE/COLONY COUNT: CPT

## 2023-10-29 PROCEDURE — 87077 CULTURE AEROBIC IDENTIFY: CPT

## 2023-10-29 PROCEDURE — 99213 OFFICE O/P EST LOW 20 MIN: CPT | Performed by: STUDENT IN AN ORGANIZED HEALTH CARE EDUCATION/TRAINING PROGRAM

## 2023-10-29 PROCEDURE — 87186 SC STD MICRODIL/AGAR DIL: CPT

## 2023-10-29 PROCEDURE — 81002 URINALYSIS NONAUTO W/O SCOPE: CPT

## 2023-10-29 RX ORDER — NITROFURANTOIN 25; 75 MG/1; MG/1
100 CAPSULE ORAL 2 TIMES DAILY
Qty: 10 CAPSULE | Refills: 0 | Status: SHIPPED | OUTPATIENT
Start: 2023-10-29 | End: 2023-11-03

## 2023-10-29 NOTE — PROGRESS NOTES
[facility]        NAME: Saul Uribe is a 53 y.o. female  : 1975    MRN: [id number]  DATE: 2023  TIME: 2:40 PM    Assessment and Orders   Acute cystitis with hematuria [N30.01]  1. Acute cystitis with hematuria  POCT urine dip    nitrofurantoin (MACROBID) 100 mg capsule    Urine culture            Plan and Discussion      Patient denies fevers. Has low back pain versus flank pain. No concerned about pyelonephritis at this time. Will treat with Macrobid given patient's allergies. Discussed ED precautions including (but not limited to)  Difficultly breathing or shortness of breath  Chest pain  Acutely worsening symptoms. Risks and benefits discussed. Patient understands and agrees with the plan. Follow up with PCP. Chief Complaint     Chief Complaint   Patient presents with    Possible UTI     Patient states she has burning while urinating. She's also going very frequently, patient states there's pain and odor as well. History of Present Illness       Patient with lowerback pain versus flank pain    Difficulty Urinating   This is a new problem. The current episode started in the past 7 days. The problem occurs every urination. The problem has been gradually worsening. The quality of the pain is described as burning. The pain is at a severity of 5/10. There has been no fever. She is Not sexually active. There is No history of pyelonephritis. Associated symptoms include a discharge, frequency, hematuria and urgency. Pertinent negatives include no chills, flank pain, hesitancy, nausea, possible pregnancy, sweats or vomiting. Review of Systems   Review of Systems   Constitutional:  Negative for chills. Gastrointestinal:  Negative for nausea and vomiting. Genitourinary:  Positive for dysuria, frequency, hematuria and urgency. Negative for flank pain and hesitancy.          Current Medications       Current Outpatient Medications:     montelukast (SINGULAIR) 10 mg tablet, Take 1 tablet (10 mg total) by mouth daily at bedtime, Disp: 90 tablet, Rfl: 1    nitrofurantoin (MACROBID) 100 mg capsule, Take 1 capsule (100 mg total) by mouth 2 (two) times a day for 5 days, Disp: 10 capsule, Rfl: 0    ACCU-CHEK FASTCLIX LANCETS MISC, TEST 2 TIMES A DAY, Disp: , Rfl:     albuterol (PROVENTIL HFA,VENTOLIN HFA) 90 mcg/act inhaler, Inhale 2 puffs every 6 (six) hours as needed for wheezing, Disp: 1 Inhaler, Rfl: 5    atorvastatin (LIPITOR) 20 mg tablet, Take 1 tablet (20 mg total) by mouth daily at bedtime, Disp: 90 tablet, Rfl: 2    buPROPion (WELLBUTRIN XL) 150 mg 24 hr tablet, Take 150 mg by mouth every morning, Disp: , Rfl:     gabapentin (NEURONTIN) 100 mg capsule, Take 3 capsules (300 mg total) by mouth daily at bedtime, Disp: 270 capsule, Rfl: 5    glucagon 1 MG injection, Inject as directed, Disp: , Rfl:     glucose blood test strip, by In Vitro route 4 (four) times a day, Disp: , Rfl:     levothyroxine 75 mcg tablet, Take 1 tablet (75 mcg total) by mouth daily in the early morning, Disp: 90 tablet, Rfl: 2    lisinopril (ZESTRIL) 2.5 mg tablet, Take 1 tablet (2.5 mg total) by mouth daily, Disp: 90 tablet, Rfl: 2    metFORMIN (GLUCOPHAGE-XR) 750 mg 24 hr tablet, Take 1 tablet (750 mg total) by mouth Daily at 2am, Disp: , Rfl:     oxybutynin (DITROPAN) 5 mg tablet, Take 1 tablet (5 mg total) by mouth daily at bedtime, Disp: 90 tablet, Rfl: 3    phentermine 37.5 MG capsule, Take 37.5 mg by mouth every morning, Disp: , Rfl:     SUMAtriptan (IMITREX) 25 mg tablet, Take 1 tablet (25 mg total) by mouth once as needed for migraine for up to 30 doses, Disp: 30 tablet, Rfl: 2    topiramate (TOPAMAX) 50 MG tablet, Take 1 tablet (50 mg total) by mouth 2 (two) times a day, Disp: 180 tablet, Rfl: 1    TRULICITY 1.5 LM/6.9XT SOPN, INJECT SUBCUTANEOUSLY 1.5MG EVERY WEEK, Disp: , Rfl: 1    Current Allergies     Allergies as of 10/29/2023 - Reviewed 10/29/2023   Allergen Reaction Noted    Cephalosporins Anaphylaxis 03/17/2017    Norflex [orphenadrine] Anaphylaxis 03/17/2017    Rocephin [ceftriaxone] Anaphylaxis 03/17/2017    Sulfa antibiotics Hives 03/17/2017    Ultracet [tramadol-acetaminophen] Hives 03/17/2017            The following portions of the patient's history were reviewed and updated as appropriate: allergies, current medications, past family history, past medical history, past social history, past surgical history and problem list.     Past Medical History:   Diagnosis Date    Allergic     Angina pectoris ([address])     Anxiety     Asthma     Atypical chest pain     Last Assessed: 9/3/2014    Diabetes mellitus ([address])     Disease of thyroid gland     Endometriosis     Last Assessed:9/13/2016     Fatty liver     Headache(784.0)     History of transfusion 2018    Hypercalcemia     Last Assessed: 4/27/2017    Hyperlipidemia     Hyponatremia     Last assessed: 8/17/2016    Kidney stone     Metrorrhagia     Last Assessed: 9/3/2014    Microscopic hematuria     Last Assessed: 9/14/2016    Obesity     PONV (postoperative nausea and vomiting)     Proteinuria     Shortness of breath     TIA (transient ischemic attack)        Past Surgical History:   Procedure Laterality Date    HYSTERECTOMY      Resolved: 12/2013    LAPAROSCOPIC CHOLECYSTECTOMY      Last Assessed: 10/17/2017    OOPHORECTOMY      Last Assessed: 7/6/2016    OVARIAN CYST REMOVAL Right     MA CYSTO W/REMOVAL OF LESIONS SMALL N/A 03/23/2017    Procedure: Amanda Matson; BLADDER BIOPSY; Norah Rojo ;  Surgeon: Maria Fernanda Alonso MD;  Location: [facility];  Service: Urology    SCALP EXCISION N/A 10/25/2022    Procedure: EXCISION LESION/MASS SCALP;  Surgeon: Gennaro Andrade MD;  Location: [facility];  Service: General    TUBAL LIGATION         Family History   Problem Relation Age of Onset    Diabetes Mother     Hyperlipidemia Mother     Hypertension Mother     Heart disease Mother     Anxiety disorder Mother     Arthritis Mother Depression Mother     Heart attack Mother     Stroke Mother     Hyperlipidemia Father     Hypertension Father     Alcohol abuse Father     Colon cancer Father     Diabetes Sister     Asthma Sister     Melanoma Sister     Breast cancer Sister     Depression Son     Anxiety disorder Daughter     Colon cancer Maternal Grandmother     Colon cancer Paternal Grandmother     Arthritis Maternal Aunt     Depression Maternal Aunt     Diabetes Maternal Aunt     Hypertension Maternal Aunt     Breast cancer Maternal Aunt     Diabetes Maternal Uncle     Arthritis Paternal Aunt     Diabetes Paternal Aunt     Hypertension Paternal Aunt     Breast cancer Paternal Aunt     Vision loss Paternal Aunt     Diabetes Paternal Uncle     Vision loss Paternal Uncle     Arthritis Cousin     Hypertension Cousin     Substance Abuse Cousin     Depression Child     Breast cancer Maternal Aunt     Colon cancer Paternal Uncle     Cancer Cousin          Medications have been verified. Objective   /82   Pulse 72   Temp 98.9 °F (37.2 °C)   Resp 20   Wt 74.8 kg (165 lb)   SpO2 100%   BMI 29.23 kg/m²   No LMP recorded. Patient has had a hysterectomy. Physical Exam     Physical Exam  Constitutional:       General: She is not in acute distress. Appearance: She is normal weight. She is not diaphoretic. Pulmonary:      Effort: Pulmonary effort is normal. No respiratory distress. Abdominal:      Tenderness: There is no right CVA tenderness or left CVA tenderness. Skin:     General: Skin is warm. Neurological:      General: No focal deficit present. Mental Status: She is alert and oriented to person, place, and time.    Psychiatric:         Mood and Affect: Mood normal.         Behavior: Behavior normal.               Billey Lombard Viotto DO

## 2023-11-02 LAB — BACTERIA UR CULT: ABNORMAL

## 2023-12-13 ENCOUNTER — CLINICAL SUPPORT (OUTPATIENT)
Dept: INTERNAL MEDICINE CLINIC | Facility: CLINIC | Age: 48
End: 2023-12-13
Payer: COMMERCIAL

## 2023-12-13 DIAGNOSIS — Z23 ENCOUNTER FOR IMMUNIZATION: Primary | ICD-10-CM

## 2023-12-13 PROCEDURE — 90686 IIV4 VACC NO PRSV 0.5 ML IM: CPT

## 2023-12-13 PROCEDURE — 90471 IMMUNIZATION ADMIN: CPT

## 2024-01-29 ENCOUNTER — APPOINTMENT (OUTPATIENT)
Age: 49
End: 2024-01-29
Payer: COMMERCIAL

## 2024-01-29 ENCOUNTER — OFFICE VISIT (OUTPATIENT)
Dept: INTERNAL MEDICINE CLINIC | Facility: CLINIC | Age: 49
End: 2024-01-29
Payer: COMMERCIAL

## 2024-01-29 VITALS
HEART RATE: 62 BPM | OXYGEN SATURATION: 100 % | WEIGHT: 170.6 LBS | BODY MASS INDEX: 30.23 KG/M2 | SYSTOLIC BLOOD PRESSURE: 128 MMHG | TEMPERATURE: 98.7 F | HEIGHT: 63 IN | DIASTOLIC BLOOD PRESSURE: 88 MMHG

## 2024-01-29 DIAGNOSIS — R80.9 TYPE 2 DIABETES MELLITUS WITH MICROALBUMINURIA, WITHOUT LONG-TERM CURRENT USE OF INSULIN: ICD-10-CM

## 2024-01-29 DIAGNOSIS — R30.0 DYSURIA: ICD-10-CM

## 2024-01-29 DIAGNOSIS — J45.20 MILD INTERMITTENT ASTHMA, UNSPECIFIED WHETHER COMPLICATED: ICD-10-CM

## 2024-01-29 DIAGNOSIS — Z12.11 COLON CANCER SCREENING: Primary | ICD-10-CM

## 2024-01-29 DIAGNOSIS — G43.009 MIGRAINE WITHOUT AURA AND WITHOUT STATUS MIGRAINOSUS, NOT INTRACTABLE: ICD-10-CM

## 2024-01-29 DIAGNOSIS — I10 ESSENTIAL HYPERTENSION: ICD-10-CM

## 2024-01-29 DIAGNOSIS — E11.21 TYPE 2 DIABETES MELLITUS WITH DIABETIC NEPHROPATHY, WITHOUT LONG-TERM CURRENT USE OF INSULIN (HCC): ICD-10-CM

## 2024-01-29 DIAGNOSIS — Z12.31 SCREENING MAMMOGRAM FOR BREAST CANCER: ICD-10-CM

## 2024-01-29 DIAGNOSIS — E78.5 HYPERLIPIDEMIA, UNSPECIFIED HYPERLIPIDEMIA TYPE: ICD-10-CM

## 2024-01-29 DIAGNOSIS — R80.1 PERSISTENT PROTEINURIA: ICD-10-CM

## 2024-01-29 DIAGNOSIS — E11.29 TYPE 2 DIABETES MELLITUS WITH MICROALBUMINURIA, WITHOUT LONG-TERM CURRENT USE OF INSULIN: ICD-10-CM

## 2024-01-29 PROBLEM — G89.29 ABDOMINAL PAIN, CHRONIC, EPIGASTRIC: Status: RESOLVED | Noted: 2017-11-07 | Resolved: 2024-01-29

## 2024-01-29 PROBLEM — R10.13 ABDOMINAL PAIN, CHRONIC, EPIGASTRIC: Status: RESOLVED | Noted: 2017-11-07 | Resolved: 2024-01-29

## 2024-01-29 LAB
ALBUMIN SERPL BCP-MCNC: 4.3 G/DL (ref 3.5–5)
ALP SERPL-CCNC: 106 U/L (ref 34–104)
ALT SERPL W P-5'-P-CCNC: 30 U/L (ref 7–52)
ANION GAP SERPL CALCULATED.3IONS-SCNC: 7 MMOL/L
AST SERPL W P-5'-P-CCNC: 14 U/L (ref 13–39)
BACTERIA UR QL AUTO: ABNORMAL /HPF
BASOPHILS # BLD AUTO: 0.07 THOUSANDS/ÂΜL (ref 0–0.1)
BASOPHILS NFR BLD AUTO: 1 % (ref 0–1)
BILIRUB SERPL-MCNC: 0.37 MG/DL (ref 0.2–1)
BILIRUB UR QL STRIP: NEGATIVE
BUN SERPL-MCNC: 14 MG/DL (ref 5–25)
CALCIUM SERPL-MCNC: 9.9 MG/DL (ref 8.4–10.2)
CAOX CRY URNS QL MICRO: ABNORMAL /HPF
CHLORIDE SERPL-SCNC: 108 MMOL/L (ref 96–108)
CHOLEST SERPL-MCNC: 232 MG/DL
CLARITY UR: CLEAR
CO2 SERPL-SCNC: 26 MMOL/L (ref 21–32)
COLOR UR: ABNORMAL
CREAT SERPL-MCNC: 0.76 MG/DL (ref 0.6–1.3)
CREAT UR-MCNC: 112.4 MG/DL
EOSINOPHIL # BLD AUTO: 0.12 THOUSAND/ÂΜL (ref 0–0.61)
EOSINOPHIL NFR BLD AUTO: 2 % (ref 0–6)
ERYTHROCYTE [DISTWIDTH] IN BLOOD BY AUTOMATED COUNT: 13.8 % (ref 11.6–15.1)
EST. AVERAGE GLUCOSE BLD GHB EST-MCNC: 131 MG/DL
GFR SERPL CREATININE-BSD FRML MDRD: 93 ML/MIN/1.73SQ M
GLUCOSE P FAST SERPL-MCNC: 105 MG/DL (ref 65–99)
GLUCOSE UR STRIP-MCNC: NEGATIVE MG/DL
HBA1C MFR BLD: 6.2 %
HCT VFR BLD AUTO: 43.2 % (ref 34.8–46.1)
HDLC SERPL-MCNC: 43 MG/DL
HGB BLD-MCNC: 14.1 G/DL (ref 11.5–15.4)
HGB UR QL STRIP.AUTO: ABNORMAL
IMM GRANULOCYTES # BLD AUTO: 0.03 THOUSAND/UL (ref 0–0.2)
IMM GRANULOCYTES NFR BLD AUTO: 0 % (ref 0–2)
KETONES UR STRIP-MCNC: NEGATIVE MG/DL
LDLC SERPL CALC-MCNC: 145 MG/DL (ref 0–100)
LEUKOCYTE ESTERASE UR QL STRIP: ABNORMAL
LYMPHOCYTES # BLD AUTO: 2.04 THOUSANDS/ÂΜL (ref 0.6–4.47)
LYMPHOCYTES NFR BLD AUTO: 30 % (ref 14–44)
MCH RBC QN AUTO: 32.3 PG (ref 26.8–34.3)
MCHC RBC AUTO-ENTMCNC: 32.6 G/DL (ref 31.4–37.4)
MCV RBC AUTO: 99 FL (ref 82–98)
MICROALBUMIN UR-MCNC: <7 MG/L
MICROALBUMIN/CREAT 24H UR: <6 MG/G CREATININE (ref 0–30)
MONOCYTES # BLD AUTO: 0.48 THOUSAND/ÂΜL (ref 0.17–1.22)
MONOCYTES NFR BLD AUTO: 7 % (ref 4–12)
MUCOUS THREADS UR QL AUTO: ABNORMAL
NEUTROPHILS # BLD AUTO: 3.98 THOUSANDS/ÂΜL (ref 1.85–7.62)
NEUTS SEG NFR BLD AUTO: 60 % (ref 43–75)
NITRITE UR QL STRIP: NEGATIVE
NON-SQ EPI CELLS URNS QL MICRO: ABNORMAL /HPF
NRBC BLD AUTO-RTO: 0 /100 WBCS
PH UR STRIP.AUTO: 5.5 [PH]
PLATELET # BLD AUTO: 348 THOUSANDS/UL (ref 149–390)
PMV BLD AUTO: 11.1 FL (ref 8.9–12.7)
POTASSIUM SERPL-SCNC: 4.2 MMOL/L (ref 3.5–5.3)
PROT SERPL-MCNC: 6.9 G/DL (ref 6.4–8.4)
PROT UR STRIP-MCNC: NEGATIVE MG/DL
RBC # BLD AUTO: 4.37 MILLION/UL (ref 3.81–5.12)
RBC #/AREA URNS AUTO: ABNORMAL /HPF
SODIUM SERPL-SCNC: 141 MMOL/L (ref 135–147)
SP GR UR STRIP.AUTO: 1.02 (ref 1–1.03)
TRIGL SERPL-MCNC: 221 MG/DL
TSH SERPL DL<=0.05 MIU/L-ACNC: 4.5 UIU/ML (ref 0.45–4.5)
UROBILINOGEN UR STRIP-ACNC: <2 MG/DL
WBC # BLD AUTO: 6.72 THOUSAND/UL (ref 4.31–10.16)
WBC #/AREA URNS AUTO: ABNORMAL /HPF

## 2024-01-29 PROCEDURE — 84443 ASSAY THYROID STIM HORMONE: CPT

## 2024-01-29 PROCEDURE — 3074F SYST BP LT 130 MM HG: CPT | Performed by: INTERNAL MEDICINE

## 2024-01-29 PROCEDURE — 80053 COMPREHEN METABOLIC PANEL: CPT

## 2024-01-29 PROCEDURE — 82570 ASSAY OF URINE CREATININE: CPT

## 2024-01-29 PROCEDURE — 82043 UR ALBUMIN QUANTITATIVE: CPT

## 2024-01-29 PROCEDURE — 99214 OFFICE O/P EST MOD 30 MIN: CPT | Performed by: INTERNAL MEDICINE

## 2024-01-29 PROCEDURE — 3044F HG A1C LEVEL LT 7.0%: CPT | Performed by: INTERNAL MEDICINE

## 2024-01-29 PROCEDURE — 36415 COLL VENOUS BLD VENIPUNCTURE: CPT

## 2024-01-29 PROCEDURE — 3079F DIAST BP 80-89 MM HG: CPT | Performed by: INTERNAL MEDICINE

## 2024-01-29 PROCEDURE — 81001 URINALYSIS AUTO W/SCOPE: CPT

## 2024-01-29 PROCEDURE — 85025 COMPLETE CBC W/AUTO DIFF WBC: CPT

## 2024-01-29 PROCEDURE — 80061 LIPID PANEL: CPT

## 2024-01-29 PROCEDURE — 83036 HEMOGLOBIN GLYCOSYLATED A1C: CPT

## 2024-01-29 RX ORDER — SUMATRIPTAN 25 MG/1
25 TABLET, FILM COATED ORAL ONCE AS NEEDED
Qty: 30 TABLET | Refills: 2 | Status: SHIPPED | OUTPATIENT
Start: 2024-01-29

## 2024-01-29 RX ORDER — TOPIRAMATE 100 MG/1
100 TABLET, FILM COATED ORAL 2 TIMES DAILY
Qty: 90 TABLET | Refills: 1 | Status: SHIPPED | OUTPATIENT
Start: 2024-01-29

## 2024-01-29 RX ORDER — TOPIRAMATE 100 MG/1
100 TABLET, FILM COATED ORAL 2 TIMES DAILY
COMMUNITY
Start: 2023-11-13 | End: 2024-01-29 | Stop reason: SDUPTHER

## 2024-01-29 NOTE — PROGRESS NOTES
Patient's shoes and socks removed.    Right Foot/Ankle   Right Foot Inspection  Skin Exam: skin normal and skin intact. No dry skin, no warmth, no callus, no erythema, no maceration, no abnormal color, no pre-ulcer, no ulcer and no callus.     Toe Exam: ROM and strength within normal limits.     Sensory   Vibration: intact  Proprioception: intact  Monofilament testing: intact    Vascular  The right DP pulse is 2+. The right PT pulse is 2+.     Left Foot/Ankle  Left Foot Inspection  Skin Exam: skin normal and skin intact. No dry skin, no warmth, no erythema, no maceration, normal color, no pre-ulcer, no ulcer and no callus.     Toe Exam: ROM and strength within normal limits.     Sensory   Vibration: intact  Proprioception: intact  Monofilament testing: intact    Vascular  The left DP pulse is 2+. The left PT pulse is 2+.     Assign Risk Category  No deformity present  No loss of protective sensation  No weak pulses  Risk: 0

## 2024-01-29 NOTE — PROGRESS NOTES
Assessment/Plan:     Patient is here for routine follow up, reviewed chronic medical problems, ordered labs for next visit including CBC CMP TSH A1C LIPID. Labs pending for this visit.    Type 2 DM, A1c, micro albumin is pending; her trulicity is out of stock, she will call endocrine. Foot exam done today.    HTN controlled; HLD controlled; continue lisinopril, statin;     Migraine do controlled; continue Topamax.    Due for mammogram, colonoscopy.     Quality Measures:       Depression Screening and Follow-up Plan: Patient was screened for depression during today's encounter. They screened negative with a PHQ-2 score of 1.Clincally patient does not have depression. No treatment is required.        Return in about 6 months (around 7/29/2024).    No problem-specific Assessment & Plan notes found for this encounter.       Diagnoses and all orders for this visit:    Colon cancer screening  -     Ambulatory Referral to Gastroenterology; Future    Migraine without aura and without status migrainosus, not intractable  -     SUMAtriptan (IMITREX) 25 mg tablet; Take 1 tablet (25 mg total) by mouth once as needed for migraine for up to 90 doses  -     topiramate (TOPAMAX) 100 mg tablet; Take 1 tablet (100 mg total) by mouth 2 (two) times a day    Type 2 diabetes mellitus with diabetic nephropathy, without long-term current use of insulin (HCC)  -     SUMAtriptan (IMITREX) 25 mg tablet; Take 1 tablet (25 mg total) by mouth once as needed for migraine for up to 90 doses  -     Comprehensive metabolic panel; Future  -     Lipid Panel with Direct LDL reflex; Future  -     CBC and differential; Future  -     TSH, 3rd generation with Free T4 reflex; Future    Essential hypertension  -     SUMAtriptan (IMITREX) 25 mg tablet; Take 1 tablet (25 mg total) by mouth once as needed for migraine for up to 90 doses    Persistent proteinuria  -     SUMAtriptan (IMITREX) 25 mg tablet; Take 1 tablet (25 mg total) by mouth once as needed for  migraine for up to 90 doses    Mild intermittent asthma, unspecified whether complicated  -     SUMAtriptan (IMITREX) 25 mg tablet; Take 1 tablet (25 mg total) by mouth once as needed for migraine for up to 90 doses    Hyperlipidemia, unspecified hyperlipidemia type  -     SUMAtriptan (IMITREX) 25 mg tablet; Take 1 tablet (25 mg total) by mouth once as needed for migraine for up to 90 doses    Screening mammogram for breast cancer  -     Mammo screening bilateral w 3d & cad; Future    Other orders  -     Discontinue: topiramate (TOPAMAX) 100 mg tablet; Take 100 mg by mouth 2 (two) times a day  -     glucose blood test strip; BY MISCELLANEOUS ROUTE 3 (THREE) TIMES A DAY. ONE TOUCH STRIPS 11.65          Subjective:      Patient ID: Nancie Cordoba is a 48 y.o. female.    Here for routine f/u.      ALLERGIES:  Allergies   Allergen Reactions   • Cephalosporins Anaphylaxis   • Norflex [Orphenadrine] Anaphylaxis   • Rocephin [Ceftriaxone] Anaphylaxis   • Sulfa Antibiotics Hives   • Ultracet [Tramadol-Acetaminophen] Hives       CURRENT MEDICATIONS:    Current Outpatient Medications:   •  ACCU-CHEK FASTCLIX LANCETS MISC, TEST 2 TIMES A DAY, Disp: , Rfl:   •  albuterol (PROVENTIL HFA,VENTOLIN HFA) 90 mcg/act inhaler, Inhale 2 puffs every 6 (six) hours as needed for wheezing, Disp: 1 Inhaler, Rfl: 5  •  atorvastatin (LIPITOR) 20 mg tablet, Take 1 tablet (20 mg total) by mouth daily at bedtime, Disp: 90 tablet, Rfl: 2  •  buPROPion (WELLBUTRIN XL) 150 mg 24 hr tablet, Take 150 mg by mouth every morning, Disp: , Rfl:   •  glucagon 1 MG injection, Inject as directed, Disp: , Rfl:   •  glucose blood test strip, by In Vitro route 4 (four) times a day, Disp: , Rfl:   •  glucose blood test strip, BY MISCELLANEOUS ROUTE 3 (THREE) TIMES A DAY. ONE TOUCH STRIPS 11.65, Disp: , Rfl:   •  levothyroxine 75 mcg tablet, Take 1 tablet (75 mcg total) by mouth daily in the early morning, Disp: 90 tablet, Rfl: 2  •  lisinopril (ZESTRIL)  2.5 mg tablet, Take 1 tablet (2.5 mg total) by mouth daily, Disp: 90 tablet, Rfl: 2  •  montelukast (SINGULAIR) 10 mg tablet, Take 1 tablet (10 mg total) by mouth daily at bedtime, Disp: 90 tablet, Rfl: 1  •  SUMAtriptan (IMITREX) 25 mg tablet, Take 1 tablet (25 mg total) by mouth once as needed for migraine for up to 90 doses, Disp: 30 tablet, Rfl: 2  •  topiramate (TOPAMAX) 100 mg tablet, Take 1 tablet (100 mg total) by mouth 2 (two) times a day, Disp: 90 tablet, Rfl: 1  •  gabapentin (NEURONTIN) 100 mg capsule, Take 3 capsules (300 mg total) by mouth daily at bedtime, Disp: 270 capsule, Rfl: 5  •  oxybutynin (DITROPAN) 5 mg tablet, Take 1 tablet (5 mg total) by mouth daily at bedtime, Disp: 90 tablet, Rfl: 3  •  topiramate (TOPAMAX) 50 MG tablet, Take 1 tablet (50 mg total) by mouth 2 (two) times a day, Disp: 180 tablet, Rfl: 1  •  TRULICITY 1.5 MG/0.5ML SOPN, INJECT SUBCUTANEOUSLY 1.5MG EVERY WEEK, Disp: , Rfl: 1    ACTIVE PROBLEM LIST:  Patient Active Problem List   Diagnosis   • Aftercare following surgery of the genitourinary system   • Asthma, mild intermittent   • Benign essential hypertension   • Borderline abnormal thyroid function test   • Diabetes (HCC)   • Diabetes mellitus with proteinuria    • Dysplastic nevi   • Neuropathy, peripheral   • Nonalcoholic fatty liver disease   • Numbness and tingling in right hand   • Obesity   • Overactive bladder   • Type 2 diabetes mellitus with microalbuminuria    • Vitamin D deficiency   • Urinary, incontinence, stress female   • Persistent proteinuria   • Family history of MI (myocardial infarction)   • Hyperlipidemia, unspecified   • Multiple benign nevi   • Prolapse of female pelvic organs   • Migraine without aura and without status migrainosus, not intractable       PAST MEDICAL HISTORY:  Past Medical History:   Diagnosis Date   • Allergic    • Angina pectoris (HCC)    • Anxiety    • Asthma    • Atypical chest pain     Last Assessed: 9/3/2014   • Diabetes  mellitus (HCC)    • Disease of thyroid gland    • Endometriosis     Last Assessed:9/13/2016    • Fatty liver    • Headache(784.0)    • History of transfusion 2018   • Hypercalcemia     Last Assessed: 4/27/2017   • Hyperlipidemia    • Hyponatremia     Last assessed: 8/17/2016   • Kidney stone    • Metrorrhagia     Last Assessed: 9/3/2014   • Microscopic hematuria     Last Assessed: 9/14/2016   • Obesity    • PONV (postoperative nausea and vomiting)    • Proteinuria    • Shortness of breath    • TIA (transient ischemic attack)        PAST SURGICAL HISTORY:  Past Surgical History:   Procedure Laterality Date   • HYSTERECTOMY      Resolved: 12/2013   • LAPAROSCOPIC CHOLECYSTECTOMY      Last Assessed: 10/17/2017   • OOPHORECTOMY      Last Assessed: 7/6/2016   • OVARIAN CYST REMOVAL Right    • IA CYSTO W/REMOVAL OF LESIONS SMALL N/A 03/23/2017    Procedure: CYSTOSCOPY; BLADDER BIOPSY; FULGERATION ;  Surgeon: Everardo Morales MD;  Location: [facility];  Service: Urology   • SCALP EXCISION N/A 10/25/2022    Procedure: EXCISION LESION/MASS SCALP;  Surgeon: Maida Valle MD;  Location: [facility];  Service: General   • TUBAL LIGATION         FAMILY HISTORY:  Family History   Problem Relation Age of Onset   • Diabetes Mother    • Hyperlipidemia Mother    • Hypertension Mother    • Heart disease Mother    • Anxiety disorder Mother    • Arthritis Mother    • Depression Mother    • Heart attack Mother    • Stroke Mother    • Hyperlipidemia Father    • Hypertension Father    • Alcohol abuse Father    • Colon cancer Father    • Diabetes Sister    • Asthma Sister    • Melanoma Sister    • Breast cancer Sister    • Depression Son    • Anxiety disorder Daughter    • Colon cancer Maternal Grandmother    • Colon cancer Paternal Grandmother    • Arthritis Maternal Aunt    • Depression Maternal Aunt    • Diabetes Maternal Aunt    • Hypertension Maternal Aunt    • Breast cancer Maternal Aunt    • Diabetes Maternal Uncle    •  Arthritis Paternal Aunt    • Diabetes Paternal Aunt    • Hypertension Paternal Aunt    • Breast cancer Paternal Aunt    • Vision loss Paternal Aunt    • Diabetes Paternal Uncle    • Vision loss Paternal Uncle    • Arthritis Cousin    • Hypertension Cousin    • Substance Abuse Cousin    • Depression Child    • Breast cancer Maternal Aunt    • Colon cancer Paternal Uncle    • Cancer Cousin        SOCIAL HISTORY:  Social History     Socioeconomic History   • Marital status:      Spouse name: Not on file   • Number of children: Not on file   • Years of education: Not on file   • Highest education level: Not on file   Occupational History   • Not on file   Tobacco Use   • Smoking status: Never   • Smokeless tobacco: Never   Vaping Use   • Vaping status: Never Used   Substance and Sexual Activity   • Alcohol use: Yes     Comment: occ   • Drug use: No   • Sexual activity: Not Currently     Partners: Male     Birth control/protection: Abstinence   Other Topics Concern   • Not on file   Social History Narrative    Caffeine use     Social Determinants of Health     Financial Resource Strain: Not on file   Food Insecurity: Not on file   Transportation Needs: Not on file   Physical Activity: Not on file   Stress: Not on file   Social Connections: Not on file   Intimate Partner Violence: Not on file   Housing Stability: Not on file       Review of Systems   Constitutional:  Negative for chills and fever.   HENT:  Negative for ear pain and sore throat.    Eyes:  Negative for pain and visual disturbance.   Respiratory:  Negative for cough and shortness of breath.    Cardiovascular:  Negative for chest pain and palpitations.   Gastrointestinal:  Negative for abdominal pain and vomiting.   Genitourinary:  Negative for dysuria and hematuria.   Musculoskeletal:  Negative for arthralgias and back pain.   Skin:  Negative for color change and rash.   Neurological:  Negative for seizures and syncope.   All other systems reviewed  "and are negative.        Objective:  Vitals:    01/29/24 1304   BP: 128/88   BP Location: Left arm   Patient Position: Sitting   Cuff Size: Standard   Pulse: 62   Temp: 98.7 °F (37.1 °C)   TempSrc: Tympanic   SpO2: 100%   Weight: 77.4 kg (170 lb 9.6 oz)   Height: 5' 3\" (1.6 m)     Body mass index is 30.22 kg/m².     Physical Exam  Vitals and nursing note reviewed.   Constitutional:       Appearance: Normal appearance. She is obese.   HENT:      Head: Normocephalic and atraumatic.   Cardiovascular:      Rate and Rhythm: Normal rate and regular rhythm.      Heart sounds: Normal heart sounds.   Pulmonary:      Effort: Pulmonary effort is normal.      Breath sounds: Normal breath sounds.   Musculoskeletal:         General: Normal range of motion.      Cervical back: Normal range of motion.      Right lower leg: No edema.      Left lower leg: No edema.   Lymphadenopathy:      Cervical: No cervical adenopathy.   Skin:     General: Skin is warm and dry.   Neurological:      General: No focal deficit present.      Mental Status: She is alert and oriented to person, place, and time. Mental status is at baseline.   Psychiatric:         Mood and Affect: Mood normal.         RESULTS:  Hemoglobin A1C   Date/Time Value Ref Range Status   09/21/2023 11:01 AM 5.8 (H) <5.7 % Final     Comment:     Reference Range  Non-diabetic                     <5.7  Pre-diabetic                     5.7-6.4  Diabetic                         >=6.5  ADA target for diabetic control  <=7     Cholesterol   Date/Time Value Ref Range Status   05/09/2022 09:41  See Comment mg/dL Final     Comment:     Cholesterol:         Pediatric <18 Years        Desirable          <170 mg/dL      Borderline High    170-199 mg/dL      High               >=200 mg/dL        Adult >=18 Years            Desirable        <200 mg/dL      Borderline High  200-239 mg/dL      High             >239 mg/dL       Triglycerides   Date/Time Value Ref Range Status   05/09/2022 " 09:41 AM 61 See Comment mg/dL Final     Comment:     Triglyceride:     0-9Y            <75mg/dL     10Y-17Y         <90 mg/dL       >=18Y     Normal          <150 mg/dL     Borderline High 150-199 mg/dL     High            200-499 mg/dL        Very High       >499 mg/dL    Specimen collection should occur prior to N-Acetylcysteine or Metamizole administration due to the potential for falsely depressed results.     HDL, Direct   Date/Time Value Ref Range Status   05/09/2022 09:41 AM 42 (L) >=50 mg/dL Final     Comment:     Specimen collection should occur prior to Metamizole administration due to the potential for falsley depressed results.     LDL Calculated   Date/Time Value Ref Range Status   05/09/2022 09:41 AM 53 0 - 100 mg/dL Final     Comment:     LDL Cholesterol:     Optimal           <100 mg/dl     Near Optimal      100-129 mg/dl     Above Optimal       Borderline High 130-159 mg/dl       High            160-189 mg/dl       Very High       >189 mg/dl         This screening LDL is a calculated result.   It does not have the accuracy of the Direct Measured LDL in the monitoring of patients with hyperlipidemia and/or statin therapy.   Direct Measure LDL ([id number]) must be ordered separately in these patients.     Hemoglobin   Date/Time Value Ref Range Status   05/09/2022 09:41 AM 14.3 11.5 - 15.4 g/dL Final     Hematocrit   Date/Time Value Ref Range Status   05/09/2022 09:41 AM 44.0 34.8 - 46.1 % Final     Platelets   Date/Time Value Ref Range Status   05/09/2022 09:41  149 - 390 Thousands/uL Final     TSH 3RD GENERATON   Date/Time Value Ref Range Status   01/28/2021 07:38 AM 2.130 0.358 - 3.740 uIU/mL Final     Comment:     The recommended reference ranges for TSH during pregnancy are as follows:   First trimester 0.1 to 2.5 uIU/mL   Second trimester  0.2 to 3.0 uIU/mL   Third trimester 0.3 to 3.0 uIU/m    Note: Normal ranges may not apply to patients who are transgender, non-binary, or whose legal sex,  sex at birth, and gender identity differ.     Free T4   Date/Time Value Ref Range Status   12/06/2018 10:44 AM 0.91 0.76 - 1.46 ng/dL Final     Comment:       Specimen collection should occur prior to Sulfasalazine administration due to the potential for falsely elevated results.     Sodium   Date/Time Value Ref Range Status   05/09/2022 09:41  136 - 145 mmol/L Final     BUN   Date/Time Value Ref Range Status   05/09/2022 09:41 AM 13 5 - 25 mg/dL Final     Creatinine   Date/Time Value Ref Range Status   05/09/2022 09:41 AM 0.90 0.60 - 1.30 mg/dL Final     Comment:     Standardized to IDMS reference method      In chart    This note was created with voice recognition software.  Phonic, grammatical and spelling errors may be present within the note as a result.

## 2024-02-13 ENCOUNTER — TELEPHONE (OUTPATIENT)
Age: 49
End: 2024-02-13

## 2024-02-13 NOTE — TELEPHONE ENCOUNTER
02/13/24  Screened by: Hollie Fitzgerald    Referring Provider Dr. Mcdonald    Pre- Screening: Yes    There is no height or weight on file to calculate BMI.  Has patient been referred for a routine screening Colonoscopy? yes  Is the patient between 45-75 years old? yes      Previous Colonoscopy yes   If yes:    Date: 4yrs    Facility:     Reason:       Does the patient want to see a Gastroenterologist prior to their procedure OR are they having any GI symptoms? no    Has the patient been hospitalized or had abdominal surgery in the past 6 months? no    Does the patient use supplemental oxygen? no    Does the patient take Coumadin, Lovenox, Plavix, Elliquis, Xarelto, or other blood thinning medication? no    Has the patient had a stroke, cardiac event, or stent placed in the past year? no  .

## 2024-02-13 NOTE — TELEPHONE ENCOUNTER
Scheduled date of EGD/colonoscopy (as of today): 04/08/2024  Physician performing EGD/colonoscopy: Dr. Torrez  Location of EGD/colonoscopy: [facility]  Desired bowel prep reviewed with patient: LUKASZ/DUL  Prep instructions sent via [employer]  Instructions reviewed with patient by: BRIAN  Clearances: pt is diabetic, AM apt needed

## 2024-02-24 DIAGNOSIS — G43.009 MIGRAINE WITHOUT AURA AND WITHOUT STATUS MIGRAINOSUS, NOT INTRACTABLE: ICD-10-CM

## 2024-02-26 RX ORDER — TOPIRAMATE 100 MG/1
100 TABLET, FILM COATED ORAL 2 TIMES DAILY
Qty: 180 TABLET | Refills: 1 | Status: SHIPPED | OUTPATIENT
Start: 2024-02-26

## 2024-04-08 ENCOUNTER — ANESTHESIA (OUTPATIENT)
Dept: GASTROENTEROLOGY | Facility: HOSPITAL | Age: 49
End: 2024-04-08

## 2024-04-08 ENCOUNTER — ANESTHESIA EVENT (OUTPATIENT)
Dept: GASTROENTEROLOGY | Facility: HOSPITAL | Age: 49
End: 2024-04-08

## 2024-04-08 ENCOUNTER — HOSPITAL ENCOUNTER (OUTPATIENT)
Dept: GASTROENTEROLOGY | Facility: HOSPITAL | Age: 49
Setting detail: OUTPATIENT SURGERY
Discharge: HOME | End: 2024-04-08
Attending: INTERNAL MEDICINE
Payer: COMMERCIAL

## 2024-04-08 VITALS
WEIGHT: 160.94 LBS | DIASTOLIC BLOOD PRESSURE: 78 MMHG | BODY MASS INDEX: 28.52 KG/M2 | OXYGEN SATURATION: 96 % | HEART RATE: 65 BPM | TEMPERATURE: 97.4 F | RESPIRATION RATE: 20 BRPM | SYSTOLIC BLOOD PRESSURE: 122 MMHG | HEIGHT: 63 IN

## 2024-04-08 DIAGNOSIS — Z80.0 FAMILY HISTORY OF COLON CANCER: ICD-10-CM

## 2024-04-08 LAB — GLUCOSE SERPL-MCNC: 95 MG/DL (ref 65–140)

## 2024-04-08 PROCEDURE — 88305 TISSUE EXAM BY PATHOLOGIST: CPT | Performed by: PATHOLOGY

## 2024-04-08 PROCEDURE — 82948 REAGENT STRIP/BLOOD GLUCOSE: CPT

## 2024-04-08 RX ORDER — PROPOFOL 10 MG/ML
INJECTION, EMULSION INTRAVENOUS AS NEEDED
Status: DISCONTINUED | OUTPATIENT
Start: 2024-04-08 | End: 2024-04-08

## 2024-04-08 RX ORDER — SODIUM CHLORIDE, SODIUM LACTATE, POTASSIUM CHLORIDE, CALCIUM CHLORIDE 600; 310; 30; 20 MG/100ML; MG/100ML; MG/100ML; MG/100ML
INJECTION, SOLUTION INTRAVENOUS CONTINUOUS PRN
Status: DISCONTINUED | OUTPATIENT
Start: 2024-04-08 | End: 2024-04-08

## 2024-04-08 RX ORDER — LIDOCAINE HYDROCHLORIDE 20 MG/ML
INJECTION, SOLUTION EPIDURAL; INFILTRATION; INTRACAUDAL; PERINEURAL AS NEEDED
Status: DISCONTINUED | OUTPATIENT
Start: 2024-04-08 | End: 2024-04-08

## 2024-04-08 RX ADMIN — SODIUM CHLORIDE, SODIUM LACTATE, POTASSIUM CHLORIDE, AND CALCIUM CHLORIDE: .6; .31; .03; .02 INJECTION, SOLUTION INTRAVENOUS at 08:01

## 2024-04-08 RX ADMIN — PROPOFOL 50 MG: 10 INJECTION, EMULSION INTRAVENOUS at 08:08

## 2024-04-08 RX ADMIN — PROPOFOL 100 MG: 10 INJECTION, EMULSION INTRAVENOUS at 08:04

## 2024-04-08 RX ADMIN — PROPOFOL 20 MG: 10 INJECTION, EMULSION INTRAVENOUS at 08:10

## 2024-04-08 RX ADMIN — LIDOCAINE HYDROCHLORIDE 60 MG: 20 INJECTION, SOLUTION EPIDURAL; INFILTRATION; INTRACAUDAL; PERINEURAL at 08:04

## 2024-04-08 RX ADMIN — PROPOFOL 50 MG: 10 INJECTION, EMULSION INTRAVENOUS at 08:06

## 2024-04-08 RX ADMIN — PROPOFOL 20 MG: 10 INJECTION, EMULSION INTRAVENOUS at 08:12

## 2024-04-08 NOTE — H&P
History and Physical - [facility]  Nancie Cordoba 48 y.o. female MRN: [id number]                  HPI: Nancie Cordoba is a 48 y.o. year old female who presents for colonoscopy for colon cancer screening.      REVIEW OF SYSTEMS: Per the HPI, and otherwise unremarkable.    Historical Information   Past Medical History:   Diagnosis Date    Allergic     Angina pectoris (HCC)     Anxiety     Asthma     Atypical chest pain     Last Assessed: 9/3/2014    Diabetes mellitus (HCC)     Disease of thyroid gland     Endometriosis     Last Assessed:9/13/2016     Fatty liver     Headache(784.0)     History of transfusion 2018    Hypercalcemia     Last Assessed: 4/27/2017    Hyperlipidemia     Hyponatremia     Last assessed: 8/17/2016    Kidney stone     Metrorrhagia     Last Assessed: 9/3/2014    Microscopic hematuria     Last Assessed: 9/14/2016    Obesity     PONV (postoperative nausea and vomiting)     Proteinuria     Shortness of breath     TIA (transient ischemic attack)      Past Surgical History:   Procedure Laterality Date    HYSTERECTOMY      Resolved: 12/2013    LAPAROSCOPIC CHOLECYSTECTOMY      Last Assessed: 10/17/2017    OOPHORECTOMY      Last Assessed: 7/6/2016    OVARIAN CYST REMOVAL Right     AK CYSTO W/REMOVAL OF LESIONS SMALL N/A 03/23/2017    Procedure: CYSTOSCOPY; BLADDER BIOPSY; FULGERATION ;  Surgeon: Everardo Morales MD;  Location: [facility];  Service: Urology    SCALP EXCISION N/A 10/25/2022    Procedure: EXCISION LESION/MASS SCALP;  Surgeon: Maida Valle MD;  Location: [facility];  Service: General    TUBAL LIGATION       Social History   Social History     Substance and Sexual Activity   Alcohol Use Yes    Comment: occ     Social History     Substance and Sexual Activity   Drug Use No     Social History     Tobacco Use   Smoking Status Never   Smokeless Tobacco Never     Family History   Problem Relation Age of Onset    Diabetes Mother     Hyperlipidemia  Mother     Hypertension Mother     Heart disease Mother     Anxiety disorder Mother     Arthritis Mother     Depression Mother     Heart attack Mother     Stroke Mother     Hyperlipidemia Father     Hypertension Father     Alcohol abuse Father     Colon cancer Father     Diabetes Sister     Asthma Sister     Melanoma Sister     Breast cancer Sister     Depression Son     Anxiety disorder Daughter     Colon cancer Maternal Grandmother     Colon cancer Paternal Grandmother     Arthritis Maternal Aunt     Depression Maternal Aunt     Diabetes Maternal Aunt     Hypertension Maternal Aunt     Breast cancer Maternal Aunt     Diabetes Maternal Uncle     Arthritis Paternal Aunt     Diabetes Paternal Aunt     Hypertension Paternal Aunt     Breast cancer Paternal Aunt     Vision loss Paternal Aunt     Diabetes Paternal Uncle     Vision loss Paternal Uncle     Arthritis Cousin     Hypertension Cousin     Substance Abuse Cousin     Depression Child     Breast cancer Maternal Aunt     Colon cancer Paternal Uncle     Cancer Cousin        Meds/Allergies       Current Outpatient Medications:     atorvastatin (LIPITOR) 20 mg tablet    buPROPion (WELLBUTRIN XL) 150 mg 24 hr tablet    levothyroxine 75 mcg tablet    montelukast (SINGULAIR) 10 mg tablet    SUMAtriptan (IMITREX) 25 mg tablet    topiramate (TOPAMAX) 100 mg tablet    topiramate (TOPAMAX) 50 MG tablet    TRULICITY 1.5 MG/0.5ML SOPN    ACCU-CHEK FASTCLIX LANCETS MISC    albuterol (PROVENTIL HFA,VENTOLIN HFA) 90 mcg/act inhaler    gabapentin (NEURONTIN) 100 mg capsule    glucagon 1 MG injection    glucose blood test strip    glucose blood test strip    lisinopril (ZESTRIL) 2.5 mg tablet    oxybutynin (DITROPAN) 5 mg tablet    Allergies   Allergen Reactions    Cephalosporins Anaphylaxis    Norflex [Orphenadrine] Anaphylaxis    Rocephin [Ceftriaxone] Anaphylaxis    Sulfa Antibiotics Hives    Ultracet [Tramadol-Acetaminophen] Hives       Objective     /85   Pulse 74    "Temp 97.7 °F (36.5 °C) (Temporal)   Resp 16   Ht 5' 3\" (1.6 m)   Wt 73 kg (160 lb 15 oz)   SpO2 100%   BMI 28.51 kg/m²       PHYSICAL EXAM    Gen: NAD  Head: NCAT  CV: RRR  CHEST: Clear  ABD: soft, NT/ND  EXT: no edema      ASSESSMENT/PLAN:   Nancie Cordoba is a 48 y.o. year old female who presents for colonoscopy for colon cancer screening. The patient is stable and optimized for the procedure, we reviewed risk and benefits. Risk include but not limited to infection, bleeding, perforation and missing a lesion.          "

## 2024-04-08 NOTE — ANESTHESIA POSTPROCEDURE EVALUATION
Post-Op Assessment Note    CV Status:  Stable  Pain Score: 0    Pain management: adequate       Mental Status:  Alert and awake   Hydration Status:  Euvolemic   PONV Controlled:  Controlled   Airway Patency:  Patent     Post Op Vitals Reviewed: Yes    No anethesia notable event occurred.    Staff: GWENDOLYN               /70 (04/08/24 0820)    Temp (!) 97.4 °F (36.3 °C) (04/08/24 0820)    Pulse 65 (04/08/24 0820)   Resp 20 (04/08/24 0820)    SpO2 96 % (04/08/24 0820)

## 2024-04-08 NOTE — ANESTHESIA PREPROCEDURE EVALUATION
Procedure:  COLONOSCOPY    Relevant Problems   CARDIO   (+) Benign essential hypertension   (+) Hyperlipidemia, unspecified   (+) Migraine without aura and without status migrainosus, not intractable      ENDO   (+) Type 2 diabetes mellitus with microalbuminuria (HCC)      GI/HEPATIC   (+) Nonalcoholic fatty liver disease      NEURO/PSYCH   (+) Migraine without aura and without status migrainosus, not intractable   (+) Numbness and tingling in right hand      PULMONARY   (+) Asthma, mild intermittent        Physical Exam    Airway    Mallampati score: II  TM Distance: >3 FB  Neck ROM: full     Dental   No notable dental hx     Cardiovascular  Rhythm: regular, Rate: normal, Cardiovascular exam normal    Pulmonary  Pulmonary exam normal     Other Findings  post-pubertal.      Anesthesia Plan  ASA Score- 2     Anesthesia Type- IV sedation with anesthesia with ASA Monitors.         Additional Monitors:     Airway Plan:            Plan Factors-Exercise tolerance (METS): >4 METS.    Chart reviewed. EKG reviewed. Imaging results reviewed. Existing labs reviewed. Patient summary reviewed.    Patient is not a current smoker.  Patient did not smoke on day of surgery.            Induction- intravenous.    Postoperative Plan-     Informed Consent- Anesthetic plan and risks discussed with patient.  I personally reviewed this patient with the CRNA. Discussed and agreed on the Anesthesia Plan with the CRNA..

## 2024-04-10 PROCEDURE — 88305 TISSUE EXAM BY PATHOLOGIST: CPT | Performed by: PATHOLOGY

## 2024-04-19 DIAGNOSIS — J45.909 UNCOMPLICATED ASTHMA, UNSPECIFIED ASTHMA SEVERITY, UNSPECIFIED WHETHER PERSISTENT: ICD-10-CM

## 2024-04-19 RX ORDER — MONTELUKAST SODIUM 10 MG/1
10 TABLET ORAL
Qty: 90 TABLET | Refills: 1 | Status: SHIPPED | OUTPATIENT
Start: 2024-04-19

## 2024-05-24 DIAGNOSIS — J45.20 MILD INTERMITTENT ASTHMA, UNSPECIFIED WHETHER COMPLICATED: ICD-10-CM

## 2024-05-24 DIAGNOSIS — E11.29 TYPE 2 DIABETES MELLITUS WITH MICROALBUMINURIA, WITHOUT LONG-TERM CURRENT USE OF INSULIN (HCC): ICD-10-CM

## 2024-05-24 DIAGNOSIS — N39.3 URINARY, INCONTINENCE, STRESS FEMALE: ICD-10-CM

## 2024-05-24 DIAGNOSIS — E66.9 OBESITY WITH SERIOUS COMORBIDITY, UNSPECIFIED CLASSIFICATION, UNSPECIFIED OBESITY TYPE: ICD-10-CM

## 2024-05-24 DIAGNOSIS — I10 BENIGN ESSENTIAL HYPERTENSION: ICD-10-CM

## 2024-05-24 DIAGNOSIS — G62.9 PERIPHERAL POLYNEUROPATHY: ICD-10-CM

## 2024-05-24 DIAGNOSIS — R80.9 TYPE 2 DIABETES MELLITUS WITH MICROALBUMINURIA, WITHOUT LONG-TERM CURRENT USE OF INSULIN (HCC): ICD-10-CM

## 2024-05-24 DIAGNOSIS — N30.01 ACUTE CYSTITIS WITH HEMATURIA: ICD-10-CM

## 2024-05-24 RX ORDER — GABAPENTIN 100 MG/1
300 CAPSULE ORAL
Qty: 270 CAPSULE | Refills: 5 | Status: SHIPPED | OUTPATIENT
Start: 2024-05-24

## 2024-07-17 DIAGNOSIS — N32.81 OAB (OVERACTIVE BLADDER): ICD-10-CM

## 2024-07-17 RX ORDER — OXYBUTYNIN CHLORIDE 5 MG/1
5 TABLET ORAL
Qty: 90 TABLET | Refills: 3 | Status: SHIPPED | OUTPATIENT
Start: 2024-07-17 | End: 2024-10-15

## 2024-08-12 DIAGNOSIS — G43.009 MIGRAINE WITHOUT AURA AND WITHOUT STATUS MIGRAINOSUS, NOT INTRACTABLE: ICD-10-CM

## 2024-08-12 RX ORDER — TOPIRAMATE 100 MG/1
100 TABLET, FILM COATED ORAL 2 TIMES DAILY
Qty: 180 TABLET | Refills: 1 | Status: SHIPPED | OUTPATIENT
Start: 2024-08-12

## 2024-08-27 ENCOUNTER — APPOINTMENT (OUTPATIENT)
Age: 49
End: 2024-08-27
Payer: COMMERCIAL

## 2024-08-27 DIAGNOSIS — E11.21 TYPE 2 DIABETES MELLITUS WITH DIABETIC NEPHROPATHY, WITHOUT LONG-TERM CURRENT USE OF INSULIN (HCC): ICD-10-CM

## 2024-08-27 LAB
BASOPHILS # BLD AUTO: 0.06 THOUSANDS/ÂΜL (ref 0–0.1)
BASOPHILS NFR BLD AUTO: 1 % (ref 0–1)
EOSINOPHIL # BLD AUTO: 0.11 THOUSAND/ÂΜL (ref 0–0.61)
EOSINOPHIL NFR BLD AUTO: 2 % (ref 0–6)
ERYTHROCYTE [DISTWIDTH] IN BLOOD BY AUTOMATED COUNT: 13.7 % (ref 11.6–15.1)
HCT VFR BLD AUTO: 43.1 % (ref 34.8–46.1)
HGB BLD-MCNC: 14.2 G/DL (ref 11.5–15.4)
IMM GRANULOCYTES # BLD AUTO: 0.03 THOUSAND/UL (ref 0–0.2)
IMM GRANULOCYTES NFR BLD AUTO: 0 % (ref 0–2)
LYMPHOCYTES # BLD AUTO: 1.82 THOUSANDS/ÂΜL (ref 0.6–4.47)
LYMPHOCYTES NFR BLD AUTO: 26 % (ref 14–44)
MCH RBC QN AUTO: 32.5 PG (ref 26.8–34.3)
MCHC RBC AUTO-ENTMCNC: 32.9 G/DL (ref 31.4–37.4)
MCV RBC AUTO: 99 FL (ref 82–98)
MONOCYTES # BLD AUTO: 0.46 THOUSAND/ÂΜL (ref 0.17–1.22)
MONOCYTES NFR BLD AUTO: 7 % (ref 4–12)
NEUTROPHILS # BLD AUTO: 4.46 THOUSANDS/ÂΜL (ref 1.85–7.62)
NEUTS SEG NFR BLD AUTO: 64 % (ref 43–75)
NRBC BLD AUTO-RTO: 0 /100 WBCS
PLATELET # BLD AUTO: 311 THOUSANDS/UL (ref 149–390)
PMV BLD AUTO: 11.3 FL (ref 8.9–12.7)
RBC # BLD AUTO: 4.37 MILLION/UL (ref 3.81–5.12)
T4 FREE SERPL-MCNC: 0.68 NG/DL (ref 0.61–1.12)
TSH SERPL DL<=0.05 MIU/L-ACNC: 4.86 UIU/ML (ref 0.45–4.5)
WBC # BLD AUTO: 6.94 THOUSAND/UL (ref 4.31–10.16)

## 2024-08-27 PROCEDURE — 80061 LIPID PANEL: CPT

## 2024-08-27 PROCEDURE — 80053 COMPREHEN METABOLIC PANEL: CPT

## 2024-08-27 PROCEDURE — 36415 COLL VENOUS BLD VENIPUNCTURE: CPT

## 2024-08-27 PROCEDURE — 84443 ASSAY THYROID STIM HORMONE: CPT

## 2024-08-27 PROCEDURE — 85025 COMPLETE CBC W/AUTO DIFF WBC: CPT

## 2024-08-27 PROCEDURE — 84439 ASSAY OF FREE THYROXINE: CPT

## 2024-08-28 ENCOUNTER — OFFICE VISIT (OUTPATIENT)
Dept: INTERNAL MEDICINE CLINIC | Facility: CLINIC | Age: 49
End: 2024-08-28
Payer: COMMERCIAL

## 2024-08-28 VITALS
HEIGHT: 63 IN | BODY MASS INDEX: 29.77 KG/M2 | HEART RATE: 72 BPM | DIASTOLIC BLOOD PRESSURE: 82 MMHG | OXYGEN SATURATION: 98 % | SYSTOLIC BLOOD PRESSURE: 120 MMHG | WEIGHT: 168 LBS

## 2024-08-28 DIAGNOSIS — N81.10 CYSTOCELE WITH RECTOCELE: ICD-10-CM

## 2024-08-28 DIAGNOSIS — I10 BENIGN ESSENTIAL HYPERTENSION: ICD-10-CM

## 2024-08-28 DIAGNOSIS — G62.9 PERIPHERAL POLYNEUROPATHY: ICD-10-CM

## 2024-08-28 DIAGNOSIS — E11.21 TYPE 2 DIABETES MELLITUS WITH DIABETIC NEPHROPATHY, WITHOUT LONG-TERM CURRENT USE OF INSULIN (HCC): ICD-10-CM

## 2024-08-28 DIAGNOSIS — E78.5 HYPERLIPIDEMIA, UNSPECIFIED HYPERLIPIDEMIA TYPE: ICD-10-CM

## 2024-08-28 DIAGNOSIS — N81.6 CYSTOCELE WITH RECTOCELE: ICD-10-CM

## 2024-08-28 DIAGNOSIS — N39.3 URINARY, INCONTINENCE, STRESS FEMALE: ICD-10-CM

## 2024-08-28 DIAGNOSIS — N32.81 OAB (OVERACTIVE BLADDER): ICD-10-CM

## 2024-08-28 DIAGNOSIS — R80.9 TYPE 2 DIABETES MELLITUS WITH MICROALBUMINURIA, WITHOUT LONG-TERM CURRENT USE OF INSULIN (HCC): ICD-10-CM

## 2024-08-28 DIAGNOSIS — M25.561 ACUTE PAIN OF RIGHT KNEE: Primary | ICD-10-CM

## 2024-08-28 DIAGNOSIS — N32.81 OVERACTIVE BLADDER: ICD-10-CM

## 2024-08-28 DIAGNOSIS — E11.29 TYPE 2 DIABETES MELLITUS WITH MICROALBUMINURIA, WITHOUT LONG-TERM CURRENT USE OF INSULIN (HCC): ICD-10-CM

## 2024-08-28 PROBLEM — E53.8 B12 DEFICIENCY: Status: ACTIVE | Noted: 2019-12-02

## 2024-08-28 PROBLEM — E11.69 MIXED HYPERLIPIDEMIA DUE TO TYPE 2 DIABETES MELLITUS  (HCC): Status: ACTIVE | Noted: 2019-12-02

## 2024-08-28 PROBLEM — E78.2 MIXED HYPERLIPIDEMIA DUE TO TYPE 2 DIABETES MELLITUS  (HCC): Status: ACTIVE | Noted: 2019-12-02

## 2024-08-28 LAB
ALBUMIN SERPL BCG-MCNC: 4.3 G/DL (ref 3.5–5)
ALP SERPL-CCNC: 104 U/L (ref 34–104)
ALT SERPL W P-5'-P-CCNC: 24 U/L (ref 7–52)
ANION GAP SERPL CALCULATED.3IONS-SCNC: 11 MMOL/L (ref 4–13)
AST SERPL W P-5'-P-CCNC: 16 U/L (ref 13–39)
BILIRUB SERPL-MCNC: 0.49 MG/DL (ref 0.2–1)
BUN SERPL-MCNC: 15 MG/DL (ref 5–25)
CALCIUM SERPL-MCNC: 9.8 MG/DL (ref 8.4–10.2)
CHLORIDE SERPL-SCNC: 109 MMOL/L (ref 96–108)
CHOLEST SERPL-MCNC: 227 MG/DL
CO2 SERPL-SCNC: 20 MMOL/L (ref 21–32)
CREAT SERPL-MCNC: 0.83 MG/DL (ref 0.6–1.3)
GFR SERPL CREATININE-BSD FRML MDRD: 83 ML/MIN/1.73SQ M
GLUCOSE P FAST SERPL-MCNC: 101 MG/DL (ref 65–99)
HDLC SERPL-MCNC: 39 MG/DL
LDLC SERPL CALC-MCNC: 149 MG/DL (ref 0–100)
POTASSIUM SERPL-SCNC: 3.8 MMOL/L (ref 3.5–5.3)
PROT SERPL-MCNC: 7.1 G/DL (ref 6.4–8.4)
SL AMB POCT HEMOGLOBIN AIC: 5.8 (ref ?–6.5)
SODIUM SERPL-SCNC: 140 MMOL/L (ref 135–147)
TRIGL SERPL-MCNC: 194 MG/DL

## 2024-08-28 PROCEDURE — 99214 OFFICE O/P EST MOD 30 MIN: CPT | Performed by: INTERNAL MEDICINE

## 2024-08-28 PROCEDURE — 83036 HEMOGLOBIN GLYCOSYLATED A1C: CPT | Performed by: INTERNAL MEDICINE

## 2024-08-28 RX ORDER — ATORVASTATIN CALCIUM 20 MG/1
20 TABLET, FILM COATED ORAL
Qty: 90 TABLET | Refills: 2 | Status: SHIPPED | OUTPATIENT
Start: 2024-08-28

## 2024-08-28 RX ORDER — GABAPENTIN 100 MG/1
400 CAPSULE ORAL
Start: 2024-08-28

## 2024-08-28 RX ORDER — SEMAGLUTIDE 0.68 MG/ML
INJECTION, SOLUTION SUBCUTANEOUS
COMMUNITY
Start: 2024-08-12

## 2024-08-28 NOTE — PROGRESS NOTES
Ambulatory Visit  Name: Nancie Cordoba      : 1975      MRN: [id number]  Encounter Provider: Heide Mcdonald MD  Encounter Date: 2024   Encounter department: [facility] INTERNAL MEDICINE [place]    Assessment & Plan     1. Acute pain of right knee  -     Ambulatory Referral to Orthopedic Surgery; Future  2. Type 2 diabetes mellitus with microalbuminuria, without long-term current use of insulin (HCC)  -     gabapentin (NEURONTIN) 100 mg capsule; Take 4 capsules (400 mg total) by mouth daily at bedtime  3. Benign essential hypertension  Assessment & Plan:  Controlled. Continue current regimen.    Orders:  -     gabapentin (NEURONTIN) 100 mg capsule; Take 4 capsules (400 mg total) by mouth daily at bedtime  4. Peripheral polyneuropathy  Assessment & Plan:  Not controlled. Increase gabapentin.    Orders:  -     gabapentin (NEURONTIN) 100 mg capsule; Take 4 capsules (400 mg total) by mouth daily at bedtime  5. Cystocele with rectocele  -     Ambulatory Referral to Urology; Future  6. OAB (overactive bladder)  -     Ambulatory Referral to Urology; Future  7. Overactive bladder  Assessment & Plan:  Cystocele, midline   [facility]  8. Urinary, incontinence, stress female  Assessment & Plan:  Cystocele, midline   S/p anterior colporrhaphy by [facility] in 2018  9. Type 2 diabetes mellitus with diabetic nephropathy, without long-term current use of insulin (HCC)  Assessment & Plan:    Lab Results   Component Value Date    HGBA1C 6.2 (H) 2024     Orders:  -     POCT hemoglobin A1c  10. Hyperlipidemia, unspecified hyperlipidemia type  Assessment & Plan:  Restart statin. She says she ran out of the statin. LDL not at goal.    Orders:  -     atorvastatin (LIPITOR) 20 mg tablet; Take 1 tablet (20 mg total) by mouth daily at bedtime      Depression Screening and Follow-up Plan: Patient was screened for depression during today's encounter. They screened negative with a  PHQ-2 score of 0.      History of Present Illness   Patient is here for routine follow up, reviewed chronic medical problems.        Review of Systems   Constitutional:  Negative for chills and fever.   HENT:  Negative for ear pain and sore throat.    Eyes:  Negative for pain and visual disturbance.   Respiratory:  Negative for cough and shortness of breath.    Cardiovascular:  Negative for chest pain and palpitations.   Gastrointestinal:  Negative for abdominal pain and vomiting.   Genitourinary:  Negative for dysuria and hematuria.   Musculoskeletal:  Positive for arthralgias. Negative for back pain.        Right knee pain   Skin:  Negative for color change and rash.   Neurological:  Negative for seizures and syncope.   All other systems reviewed and are negative.    Past Medical History   Past Medical History:   Diagnosis Date    Allergic     Angina pectoris (HCC)     Anxiety     Asthma     Atypical chest pain     Last Assessed: 9/3/2014    Diabetes mellitus (HCC)     Disease of thyroid gland     Endometriosis     Last Assessed:9/13/2016     Fatty liver     Headache(784.0)     History of transfusion 2018    Hypercalcemia     Last Assessed: 4/27/2017    Hyperlipidemia     Hyponatremia     Last assessed: 8/17/2016    Kidney stone     Metrorrhagia     Last Assessed: 9/3/2014    Microscopic hematuria     Last Assessed: 9/14/2016    Obesity     PONV (postoperative nausea and vomiting)     Proteinuria     Shortness of breath     TIA (transient ischemic attack)      Past Surgical History:   Procedure Laterality Date    HYSTERECTOMY      Resolved: 12/2013    LAPAROSCOPIC CHOLECYSTECTOMY      Last Assessed: 10/17/2017    OOPHORECTOMY      Last Assessed: 7/6/2016    OVARIAN CYST REMOVAL Right     [place] CYSTO W/REMOVAL OF LESIONS SMALL N/A 03/23/2017    Procedure: CYSTOSCOPY; BLADDER BIOPSY; FULGERATION ;  Surgeon: Everardo Morales MD;  Location: [facility];  Service: Urology    SCALP EXCISION N/A 10/25/2022    Procedure:  EXCISION LESION/MASS SCALP;  Surgeon: Maida Valle MD;  Location: [facility];  Service: General    TUBAL LIGATION       Family History   Problem Relation Age of Onset    Diabetes Mother     Hyperlipidemia Mother     Hypertension Mother     Heart disease Mother     Anxiety disorder Mother     Arthritis Mother     Depression Mother     Heart attack Mother     Stroke Mother     Hyperlipidemia Father     Hypertension Father     Alcohol abuse Father     Colon cancer Father     Diabetes Sister     Asthma Sister     Melanoma Sister     Breast cancer Sister     Depression Son     Anxiety disorder Daughter     Colon cancer Maternal Grandmother     Colon cancer Paternal Grandmother     Arthritis Maternal Aunt     Depression Maternal Aunt     Diabetes Maternal Aunt     Hypertension Maternal Aunt     Breast cancer Maternal Aunt     Diabetes Maternal Uncle     Arthritis Paternal Aunt     Diabetes Paternal Aunt     Hypertension Paternal Aunt     Breast cancer Paternal Aunt     Vision loss Paternal Aunt     Diabetes Paternal Uncle     Vision loss Paternal Uncle     Arthritis Cousin     Hypertension Cousin     Substance Abuse Cousin     Depression Child     Breast cancer Maternal Aunt     Colon cancer Paternal Uncle     Cancer Cousin      Current Outpatient Medications on File Prior to Visit   Medication Sig Dispense Refill    ACCU-CHEK FASTCLIX LANCETS MISC TEST 2 TIMES A DAY      albuterol (PROVENTIL HFA,VENTOLIN HFA) 90 mcg/act inhaler Inhale 2 puffs every 6 (six) hours as needed for wheezing 1 Inhaler 5    glucagon 1 MG injection Inject as directed      glucose blood test strip by In Vitro route 4 (four) times a day      glucose blood test strip BY MISCELLANEOUS ROUTE 3 (THREE) TIMES A DAY. ONE TOUCH STRIPS 11.65      levothyroxine 75 mcg tablet Take 1 tablet (75 mcg total) by mouth daily in the early morning 90 tablet 2    montelukast (SINGULAIR) 10 mg tablet Take 1 tablet (10 mg total) by mouth daily at bedtime 90 tablet  "1    oxybutynin (DITROPAN) 5 mg tablet Take 1 tablet (5 mg total) by mouth daily at bedtime 90 tablet 3    Ozempic, 0.25 or 0.5 MG/DOSE, 2 MG/3ML injection pen INJECT 0.5MG UNDER THE SKIN EVERY 7 DAYS      SUMAtriptan (IMITREX) 25 mg tablet Take 1 tablet (25 mg total) by mouth once as needed for migraine for up to 90 doses 30 tablet 2    topiramate (TOPAMAX) 100 mg tablet Take 1 tablet (100 mg total) by mouth 2 (two) times a day 180 tablet 1    [DISCONTINUED] gabapentin (NEURONTIN) 100 mg capsule Take 3 capsules (300 mg total) by mouth daily at bedtime 270 capsule 5    buPROPion (WELLBUTRIN XL) 150 mg 24 hr tablet Take 150 mg by mouth every morning (Patient not taking: Reported on 8/28/2024)      topiramate (TOPAMAX) 50 MG tablet Take 1 tablet (50 mg total) by mouth 2 (two) times a day (Patient not taking: Reported on 8/28/2024) 180 tablet 1    TRULICITY 1.5 MG/0.5ML SOPN INJECT SUBCUTANEOUSLY 1.5MG EVERY WEEK (Patient not taking: Reported on 8/28/2024)  1    [DISCONTINUED] atorvastatin (LIPITOR) 20 mg tablet Take 1 tablet (20 mg total) by mouth daily at bedtime (Patient not taking: Reported on 8/28/2024) 90 tablet 2    [DISCONTINUED] lisinopril (ZESTRIL) 2.5 mg tablet Take 1 tablet (2.5 mg total) by mouth daily (Patient not taking: Reported on 8/28/2024) 90 tablet 2     No current facility-administered medications on file prior to visit.     Allergies   Allergen Reactions    Cephalosporins Anaphylaxis    Norflex [Orphenadrine] Anaphylaxis    Rocephin [Ceftriaxone] Anaphylaxis    Sulfa Antibiotics Hives    Ultracet [Tramadol-Acetaminophen] Hives      Objective   /82 (BP Location: Left arm, Patient Position: Sitting, Cuff Size: Standard)   Pulse 72   Ht 5' 3\" (1.6 m)   Wt 76.2 kg (168 lb)   SpO2 98%   BMI 29.76 kg/m²     Physical Exam  Vitals and nursing note reviewed.   Constitutional:       General: She is not in acute distress.     Appearance: She is well-developed.   HENT:      Head: Normocephalic and " atraumatic.   Eyes:      Conjunctiva/sclera: Conjunctivae normal.   Cardiovascular:      Rate and Rhythm: Normal rate and regular rhythm.      Heart sounds: No murmur heard.  Pulmonary:      Effort: Pulmonary effort is normal. No respiratory distress.      Breath sounds: Normal breath sounds.   Abdominal:      Tenderness: There is no abdominal tenderness.   Musculoskeletal:         General: No swelling. Normal range of motion.      Cervical back: Neck supple.      Right knee: Crepitus present.   Skin:     General: Skin is warm and dry.      Capillary Refill: Capillary refill takes less than 2 seconds.   Neurological:      Mental Status: She is alert and oriented to person, place, and time. Mental status is at baseline.   Psychiatric:         Mood and Affect: Mood normal.       I have spent a total time of 15 minutes in caring for this patient on the day of the visit/encounter including Instructions for management, Importance of tx compliance, Documenting in the medical record, and Reviewing / ordering tests, medicine, procedures  .

## 2024-10-07 ENCOUNTER — APPOINTMENT (OUTPATIENT)
Dept: RADIOLOGY | Facility: CLINIC | Age: 49
End: 2024-10-07
Payer: COMMERCIAL

## 2024-10-07 ENCOUNTER — OFFICE VISIT (OUTPATIENT)
Dept: OBGYN CLINIC | Facility: CLINIC | Age: 49
End: 2024-10-07
Payer: COMMERCIAL

## 2024-10-07 VITALS
WEIGHT: 174.4 LBS | HEART RATE: 66 BPM | SYSTOLIC BLOOD PRESSURE: 129 MMHG | BODY MASS INDEX: 30.9 KG/M2 | DIASTOLIC BLOOD PRESSURE: 76 MMHG | HEIGHT: 63 IN | OXYGEN SATURATION: 100 %

## 2024-10-07 DIAGNOSIS — M25.561 ACUTE PAIN OF RIGHT KNEE: ICD-10-CM

## 2024-10-07 DIAGNOSIS — R29.898 WEAKNESS OF BOTH HIPS: ICD-10-CM

## 2024-10-07 DIAGNOSIS — M75.81 TENDINITIS OF RIGHT ROTATOR CUFF: ICD-10-CM

## 2024-10-07 DIAGNOSIS — M22.2X1 PATELLOFEMORAL SYNDROME OF RIGHT KNEE: ICD-10-CM

## 2024-10-07 DIAGNOSIS — M75.41 SUBACROMIAL IMPINGEMENT OF RIGHT SHOULDER: ICD-10-CM

## 2024-10-07 DIAGNOSIS — M25.511 ACUTE PAIN OF RIGHT SHOULDER: ICD-10-CM

## 2024-10-07 DIAGNOSIS — M75.01 ADHESIVE CAPSULITIS OF RIGHT SHOULDER: Primary | ICD-10-CM

## 2024-10-07 PROCEDURE — 20610 DRAIN/INJ JOINT/BURSA W/O US: CPT | Performed by: FAMILY MEDICINE

## 2024-10-07 PROCEDURE — 99244 OFF/OP CNSLTJ NEW/EST MOD 40: CPT | Performed by: FAMILY MEDICINE

## 2024-10-07 PROCEDURE — 73562 X-RAY EXAM OF KNEE 3: CPT

## 2024-10-07 PROCEDURE — 73030 X-RAY EXAM OF SHOULDER: CPT

## 2024-10-07 RX ORDER — TRIAMCINOLONE ACETONIDE 40 MG/ML
40 INJECTION, SUSPENSION INTRA-ARTICULAR; INTRAMUSCULAR
Status: COMPLETED | OUTPATIENT
Start: 2024-10-07 | End: 2024-10-07

## 2024-10-07 RX ORDER — NAPROXEN 500 MG/1
500 TABLET ORAL 2 TIMES DAILY WITH MEALS
Qty: 30 TABLET | Refills: 0 | Status: SHIPPED | OUTPATIENT
Start: 2024-10-07

## 2024-10-07 RX ORDER — BUPIVACAINE HYDROCHLORIDE 2.5 MG/ML
1 INJECTION, SOLUTION INFILTRATION; PERINEURAL
Status: COMPLETED | OUTPATIENT
Start: 2024-10-07 | End: 2024-10-07

## 2024-10-07 RX ORDER — BUPIVACAINE HYDROCHLORIDE 2.5 MG/ML
4 INJECTION, SOLUTION INFILTRATION; PERINEURAL
Status: COMPLETED | OUTPATIENT
Start: 2024-10-07 | End: 2024-10-07

## 2024-10-07 RX ADMIN — BUPIVACAINE HYDROCHLORIDE 1 ML: 2.5 INJECTION, SOLUTION INFILTRATION; PERINEURAL at 10:00

## 2024-10-07 RX ADMIN — TRIAMCINOLONE ACETONIDE 40 MG: 40 INJECTION, SUSPENSION INTRA-ARTICULAR; INTRAMUSCULAR at 10:00

## 2024-10-07 RX ADMIN — BUPIVACAINE HYDROCHLORIDE 4 ML: 2.5 INJECTION, SOLUTION INFILTRATION; PERINEURAL at 10:00

## 2024-10-07 NOTE — LETTER
October 7, 2024     Heide Mcdonald MD  [address]  [address] [address]    Patient: Nancie Cordoba   YOB: 1975   Date of Visit: 10/7/2024       Dear Dr. Mcdonald:    Thank you for referring Nancie Cordoba to me for evaluation. Below are my notes for this consultation.    If you have questions, please do not hesitate to call me. I look forward to following your patient along with you.         Sincerely,        Radha Sutton DO        CC: No Recipients    Radha Sutton DO  10/7/2024 11:33 AM  Sign when Signing Visit  Assessment/Plan:  Assessment & Plan  Diagnoses and all orders for this visit:    Adhesive capsulitis of right shoulder  -     XR shoulder 2+ vw right; Future  -     Ambulatory Referral to Physical Therapy; Future  -     naproxen (Naprosyn) 500 mg tablet; Take 1 tablet (500 mg total) by mouth 2 (two) times a day with meals    Subacromial impingement of right shoulder  -     Ambulatory Referral to Physical Therapy; Future    Tendinitis of right rotator cuff  -     Ambulatory Referral to Physical Therapy; Future  -     Large joint arthrocentesis: R subacromial bursa    Patellofemoral syndrome of right knee  -     XR knee 3 vw right non injury; Future  -     Brace  -     Ambulatory Referral to Physical Therapy; Future    Weakness of both hips  -     Ambulatory Referral to Physical Therapy; Future        49-year-old right-hand-dominant female with right shoulder and right knee pain 3 months duration.  Discussed with patient physical exam, imaging studies, impression, and plan.  X-rays of the right shoulder are unremarkable for significant degenerative changes.  X-rays right knee unremarkable for significant degenerative changes, noted for lateral tilt of patella.  Imaging studies, clinical exam, and history suggest that she has symptoms from right shoulder rotator cuff tendinopathy contributing to impingement and adhesive capsulitis.  She  also has abnormal patellofemoral mechanics.  I discussed treatment regimen of steroid injection, anti-inflammatory, bracing, and formal therapy.  Surgery is not warranted.  I administered mixture 3 cc 0.25% bupivacaine and 1 cc Kenalog to the right shoulder subacromial space without complication.  Upon reassessment demonstrated improved range of motion.  I provided right knee patellar stabilizing brace to wear during physical activity.  She is to start physical therapy as soon as possible and do home exercises as directed.  She is to take naproxen 500 mg twice daily with food for 2 weeks.  She will follow-up as needed.        Subjective:   Patient ID: Nancie Cordoba is a 49 y.o. female.  Chief Complaint   Patient presents with   • Right Shoulder - Pain   • Right Knee - Pain        49-year-old right-hand-dominant female presents for evaluation right shoulder and right knee pain 3 months duration.  She denies trauma or inciting event.  Pain of the right shoulder described as gradual in onset, generalized to the shoulder, achy and sore, constant, radiating distally to the upper arm, worse with moving the arm particular elevating above show level and reaching behind the back, associated with limited range of motion, and improved with resting.  She had managing symptoms with taking Tylenol and ibuprofen.  She was seen by primary care physician and referred to orthopedic care.    Shoulder Pain  This is a new problem. The current episode started more than 1 month ago. The problem occurs constantly. The problem has been gradually worsening. Associated symptoms include arthralgias. Pertinent negatives include no joint swelling, numbness or weakness. Exacerbated by: Arm movement. She has tried rest, position changes, NSAIDs and acetaminophen for the symptoms. The treatment provided mild relief.           The following portions of the patient's history were reviewed and updated as appropriate: She  has a past medical  "history of Allergic, Angina pectoris (HCC), Anxiety, Asthma, Atypical chest pain, Diabetes mellitus (HCC), Disease of thyroid gland, Endometriosis, Fatty liver, Headache(784.0), History of transfusion (2018), Hypercalcemia, Hyperlipidemia, Hyponatremia, Kidney stone, Metrorrhagia, Microscopic hematuria, Obesity, PONV (postoperative nausea and vomiting), Proteinuria, Shortness of breath, and TIA (transient ischemic attack).  She is allergic to cephalosporins, norflex [orphenadrine], rocephin [ceftriaxone], sulfa antibiotics, and ultracet [tramadol-acetaminophen]..    Review of Systems   Musculoskeletal:  Positive for arthralgias. Negative for joint swelling.   Neurological:  Negative for weakness and numbness.       Objective:  Vitals:    10/07/24 1003   BP: 129/76   Pulse: 66   SpO2: 100%   Weight: 79.1 kg (174 lb 6.4 oz)   Height: 5' 3\" (1.6 m)      Right Hand Exam     Muscle Strength   Wrist extension: 5/5   Wrist flexion: 5/5   : 5/5     Other   Sensation: normal  Pulse: present      Left Hand Exam     Muscle Strength   Wrist extension: 5/5   Wrist flexion: 5/5   :  5/5     Other   Sensation: normal  Pulse: present      Right Elbow Exam     Tenderness   The patient is experiencing no tenderness.     Range of Motion   The patient has normal right elbow ROM.    Muscle Strength   The patient has normal right elbow strength (5/5 flexion and extension).    Other   Sensation: normal      Left Elbow Exam     Other   Sensation: normal      Right Shoulder Exam     Tenderness   Right shoulder tenderness location: Anterior, lateral.    Range of Motion   Active abduction:  100   Forward flexion:  110   Right shoulder internal rotation 0 degrees: Lateral hip.     Muscle Strength   Abduction: 5/5   Internal rotation: 5/5   External rotation: 5/5   Supraspinatus: 5/5     Tests   Garcia test: positive    Other   Sensation: normal    Comments:  Pain with empty can      Left Shoulder Exam     Other   Sensation: normal " "          Strength/Myotome Testing     Left Wrist/Hand   Wrist extension: 5  Wrist flexion: 5    Right Wrist/Hand   Wrist extension: 5  Wrist flexion: 5      Physical Exam  Vitals and nursing note reviewed.   Constitutional:       Appearance: Normal appearance. She is well-developed. She is not ill-appearing or diaphoretic.   HENT:      Head: Normocephalic and atraumatic.      Right Ear: External ear normal.      Left Ear: External ear normal.   Eyes:      Conjunctiva/sclera: Conjunctivae normal.   Neck:      Trachea: No tracheal deviation.   Cardiovascular:      Rate and Rhythm: Normal rate.   Pulmonary:      Effort: Pulmonary effort is normal. No respiratory distress.   Abdominal:      General: There is no distension.   Musculoskeletal:         General: Tenderness present.   Skin:     General: Skin is warm and dry.      Coloration: Skin is not jaundiced or pale.   Neurological:      Mental Status: She is alert and oriented to person, place, and time.   Psychiatric:         Mood and Affect: Mood normal.         Behavior: Behavior normal.         Thought Content: Thought content normal.         Judgment: Judgment normal.         I have personally reviewed pertinent films in PACS and my interpretation is  .  X-rays of the right shoulder are unremarkable for significant degenerative changes.  X-rays right knee unremarkable for significant degenerative changes, noted for lateral tilt of patella.    Large joint arthrocentesis: R subacromial bursa  [place] Protocol:  procedure performed by consultantConsent: Verbal consent obtained.  Risks and benefits: risks, benefits and alternatives were discussed  Consent given by: patient  Time out: Immediately prior to procedure a \"time out\" was called to verify the correct patient, procedure, equipment, support staff and site/side marked as required.  Patient understanding: patient states understanding of the procedure being performed  Patient consent: the patient's " "understanding of the procedure matches consent given  Procedure consent: procedure consent matches procedure scheduled  Relevant documents: relevant documents present and verified  Test results: test results available and properly labeled  Site marked: the operative site was marked  Radiology Images displayed and confirmed. If images not available, report reviewed: imaging studies available  Required items: required blood products, implants, devices, and special equipment available  Patient identity confirmed: verbally with patient  Supporting Documentation  Indications: pain   Procedure Details  Location: shoulder - R subacromial bursa  Preparation: Patient was prepped and draped in the usual sterile fashion  Needle gauge: 21G 2\"  Ultrasound guidance: no  Approach: posterolateral  Medications administered: 4 mL bupivacaine 0.25 %; 1 mL bupivacaine 0.25 %; 40 mg triamcinolone acetonide 40 mg/mL    Patient tolerance: patient tolerated the procedure well with no immediate complications  Dressing:  Sterile dressing applied        More than 41 minutes were spent with reviewing patient chart, reviewing and interpreting imaging studies, obtaining history from patient, examining patient, discussing and implementing treatment plan.    "

## 2024-10-07 NOTE — PROGRESS NOTES
Assessment/Plan:  Assessment & Plan   Diagnoses and all orders for this visit:    Adhesive capsulitis of right shoulder  -     XR shoulder 2+ vw right; Future  -     Ambulatory Referral to Physical Therapy; Future  -     naproxen (Naprosyn) 500 mg tablet; Take 1 tablet (500 mg total) by mouth 2 (two) times a day with meals    Subacromial impingement of right shoulder  -     Ambulatory Referral to Physical Therapy; Future    Tendinitis of right rotator cuff  -     Ambulatory Referral to Physical Therapy; Future  -     Large joint arthrocentesis: R subacromial bursa    Patellofemoral syndrome of right knee  -     XR knee 3 vw right non injury; Future  -     Brace  -     Ambulatory Referral to Physical Therapy; Future    Weakness of both hips  -     Ambulatory Referral to Physical Therapy; Future        49-year-old right-hand-dominant female with right shoulder and right knee pain 3 months duration.  Discussed with patient physical exam, imaging studies, impression, and plan.  X-rays of the right shoulder are unremarkable for significant degenerative changes.  X-rays right knee unremarkable for significant degenerative changes, noted for lateral tilt of patella.  Imaging studies, clinical exam, and history suggest that she has symptoms from right shoulder rotator cuff tendinopathy contributing to impingement and adhesive capsulitis.  She also has abnormal patellofemoral mechanics.  I discussed treatment regimen of steroid injection, anti-inflammatory, bracing, and formal therapy.  Surgery is not warranted.  I administered mixture 3 cc 0.25% bupivacaine and 1 cc Kenalog to the right shoulder subacromial space without complication.  Upon reassessment demonstrated improved range of motion.  I provided right knee patellar stabilizing brace to wear during physical activity.  She is to start physical therapy as soon as possible and do home exercises as directed.  She is to take naproxen 500 mg twice daily with food for 2  weeks.  She will follow-up as needed.        Subjective:   Patient ID: Nancie Cordoba is a 49 y.o. female.  Chief Complaint   Patient presents with    Right Shoulder - Pain    Right Knee - Pain        49-year-old right-hand-dominant female presents for evaluation right shoulder and right knee pain 3 months duration.  She denies trauma or inciting event.  Pain of the right shoulder described as gradual in onset, generalized to the shoulder, achy and sore, constant, radiating distally to the upper arm, worse with moving the arm particular elevating above show level and reaching behind the back, associated with limited range of motion, and improved with resting.  She had managing symptoms with taking Tylenol and ibuprofen.  She was seen by primary care physician and referred to orthopedic care.    Shoulder Pain  This is a new problem. The current episode started more than 1 month ago. The problem occurs constantly. The problem has been gradually worsening. Associated symptoms include arthralgias. Pertinent negatives include no joint swelling, numbness or weakness. Exacerbated by: Arm movement. She has tried rest, position changes, NSAIDs and acetaminophen for the symptoms. The treatment provided mild relief.           The following portions of the patient's history were reviewed and updated as appropriate: She  has a past medical history of Allergic, Angina pectoris (HCC), Anxiety, Asthma, Atypical chest pain, Diabetes mellitus (HCC), Disease of thyroid gland, Endometriosis, Fatty liver, Headache(784.0), History of transfusion (2018), Hypercalcemia, Hyperlipidemia, Hyponatremia, Kidney stone, Metrorrhagia, Microscopic hematuria, Obesity, PONV (postoperative nausea and vomiting), Proteinuria, Shortness of breath, and TIA (transient ischemic attack).  She is allergic to cephalosporins, norflex [orphenadrine], rocephin [ceftriaxone], sulfa antibiotics, and ultracet [tramadol-acetaminophen]..    Review of Systems  "  Musculoskeletal:  Positive for arthralgias. Negative for joint swelling.   Neurological:  Negative for weakness and numbness.       Objective:  Vitals:    10/07/24 1003   BP: 129/76   Pulse: 66   SpO2: 100%   Weight: 79.1 kg (174 lb 6.4 oz)   Height: 5' 3\" (1.6 m)      Right Hand Exam     Muscle Strength   Wrist extension: 5/5   Wrist flexion: 5/5   : 5/5     Other   Sensation: normal  Pulse: present      Left Hand Exam     Muscle Strength   Wrist extension: 5/5   Wrist flexion: 5/5   :  5/5     Other   Sensation: normal  Pulse: present      Right Elbow Exam     Tenderness   The patient is experiencing no tenderness.     Range of Motion   The patient has normal right elbow ROM.    Muscle Strength   The patient has normal right elbow strength (5/5 flexion and extension).    Other   Sensation: normal      Left Elbow Exam     Other   Sensation: normal      Right Shoulder Exam     Tenderness   Right shoulder tenderness location: Anterior, lateral.    Range of Motion   Active abduction:  100   Forward flexion:  110   Right shoulder internal rotation 0 degrees: Lateral hip.     Muscle Strength   Abduction: 5/5   Internal rotation: 5/5   External rotation: 5/5   Supraspinatus: 5/5     Tests   Garcia test: positive    Other   Sensation: normal    Comments:  Pain with empty can      Left Shoulder Exam     Other   Sensation: normal           Strength/Myotome Testing     Left Wrist/Hand   Wrist extension: 5  Wrist flexion: 5    Right Wrist/Hand   Wrist extension: 5  Wrist flexion: 5      Physical Exam  Vitals and nursing note reviewed.   Constitutional:       Appearance: Normal appearance. She is well-developed. She is not ill-appearing or diaphoretic.   HENT:      Head: Normocephalic and atraumatic.      Right Ear: External ear normal.      Left Ear: External ear normal.   Eyes:      Conjunctiva/sclera: Conjunctivae normal.   Neck:      Trachea: No tracheal deviation.   Cardiovascular:      Rate and Rhythm: Normal " "rate.   Pulmonary:      Effort: Pulmonary effort is normal. No respiratory distress.   Abdominal:      General: There is no distension.   Musculoskeletal:         General: Tenderness present.   Skin:     General: Skin is warm and dry.      Coloration: Skin is not jaundiced or pale.   Neurological:      Mental Status: She is alert and oriented to person, place, and time.   Psychiatric:         Mood and Affect: Mood normal.         Behavior: Behavior normal.         Thought Content: Thought content normal.         Judgment: Judgment normal.         I have personally reviewed pertinent films in PACS and my interpretation is  .  X-rays of the right shoulder are unremarkable for significant degenerative changes.  X-rays right knee unremarkable for significant degenerative changes, noted for lateral tilt of patella.    Large joint arthrocentesis: R subacromial bursa  [place] Protocol:  procedure performed by consultantConsent: Verbal consent obtained.  Risks and benefits: risks, benefits and alternatives were discussed  Consent given by: patient  Time out: Immediately prior to procedure a \"time out\" was called to verify the correct patient, procedure, equipment, support staff and site/side marked as required.  Patient understanding: patient states understanding of the procedure being performed  Patient consent: the patient's understanding of the procedure matches consent given  Procedure consent: procedure consent matches procedure scheduled  Relevant documents: relevant documents present and verified  Test results: test results available and properly labeled  Site marked: the operative site was marked  Radiology Images displayed and confirmed. If images not available, report reviewed: imaging studies available  Required items: required blood products, implants, devices, and special equipment available  Patient identity confirmed: verbally with patient  Supporting Documentation  Indications: pain   Procedure " "Details  Location: shoulder - R subacromial bursa  Preparation: Patient was prepped and draped in the usual sterile fashion  Needle gauge: 21G 2\"  Ultrasound guidance: no  Approach: posterolateral  Medications administered: 4 mL bupivacaine 0.25 %; 1 mL bupivacaine 0.25 %; 40 mg triamcinolone acetonide 40 mg/mL    Patient tolerance: patient tolerated the procedure well with no immediate complications  Dressing:  Sterile dressing applied        More than 41 minutes were spent with reviewing patient chart, reviewing and interpreting imaging studies, obtaining history from patient, examining patient, discussing and implementing treatment plan.    "

## 2024-10-14 DIAGNOSIS — J45.909 UNCOMPLICATED ASTHMA, UNSPECIFIED ASTHMA SEVERITY, UNSPECIFIED WHETHER PERSISTENT: ICD-10-CM

## 2024-10-15 RX ORDER — MONTELUKAST SODIUM 10 MG/1
10 TABLET ORAL
Qty: 90 TABLET | Refills: 1 | Status: SHIPPED | OUTPATIENT
Start: 2024-10-15

## 2024-10-24 ENCOUNTER — EVALUATION (OUTPATIENT)
Dept: PHYSICAL THERAPY | Facility: CLINIC | Age: 49
End: 2024-10-24
Payer: COMMERCIAL

## 2024-10-24 DIAGNOSIS — M22.2X1 PATELLOFEMORAL SYNDROME OF RIGHT KNEE: ICD-10-CM

## 2024-10-24 DIAGNOSIS — R29.898 WEAKNESS OF BOTH HIPS: ICD-10-CM

## 2024-10-24 PROCEDURE — 97161 PT EVAL LOW COMPLEX 20 MIN: CPT

## 2024-10-24 PROCEDURE — 97110 THERAPEUTIC EXERCISES: CPT

## 2024-10-24 NOTE — PROGRESS NOTES
PT Evaluation     Today's date: 10/24/2024  Patient name: Nancie Cordoba  : 1975  MRN: [id number]  Referring provider: Radha Sutton*  Dx:   Encounter Diagnosis     ICD-10-CM    1. Patellofemoral syndrome of right knee  M22.2X1 Ambulatory Referral to Physical Therapy      2. Weakness of both hips  R29.898 Ambulatory Referral to Physical Therapy          Start Time: 1800  Stop Time: 1845  Total time in clinic (min): 45 minutes    Assessment  Impairments: abnormal gait, abnormal or restricted ROM, activity intolerance, impaired balance, impaired physical strength, lacks appropriate home exercise program, pain with function, poor posture , participation limitations, activity limitations and endurance    Assessment details: Pt is a 49 y.o. female presenting to PT services with c/o R knee pain. Pt has limited and painful R knee flexion. Pt has impaired RLE strength. PT notes lateral tracking of R patella with knee flexion. Pt has increased tone in R quadriceps and ITB. PT added quad stretch, ITB stretch, and LAQ to pt's HEP, pt verbalized and demonstrated understanding of proper form. Pt is a good candidate for skilled physical therapy in order to improve knee stability, increase knee mobility, and increase functional ability.       Goals  STG (3 weeks):  1. Pt will improve R knee flexion to be at least 90*  2. Pt will report pain at worst as 4/10 or less   3. Pt will improve R knee extension strength to be 4+/5     LTG (6 weeks):  1. Pt will be independent in HEP  2. Pt will improve R knee flexion AROM to be 115*   3. Pt will report pain at worst as 2/10 or less  4. Pt will be able to negotiate stairs without increase in pain.    Plan  Patient would benefit from: skilled physical therapy and home program  Planned modality interventions: biofeedback, cryotherapy, neuromuscular electric stimulation, TENS, thermotherapy: hydrocollator packs and unattended electrical stimulation    Planned therapy  "interventions: IASTM, joint mobilization, kinesiology taping, manual therapy, massage, abdominal trunk stabilization, balance, Petty taping, nerve gliding, neuromuscular re-education, patient/caregiver education, postural training, stretching, strengthening, therapeutic activities, therapeutic exercise, home exercise program, functional ROM exercises and flexibility    Frequency: 2x week  Duration in weeks: 8  Plan of Care beginning date: 10/24/2024  Plan of Care expiration date: 2024  Treatment plan discussed with: patient        Subjective Evaluation    History of Present Illness  Mechanism of injury: Pt reports that 8-9 years ago she worked at [employer] and she grabbed something heavy and move and her knee didn't pivot with her and she went to the work doctor and they said everything was fine, they gave her a immobilizer and told her not to walk, eventually things got better. Then about a year later she went to the doctor because she had clicking in her knee and was told she has arthritis and was just given pain killers. She states years went by and she just chalked her pain up to arthritis and when she would walk her knee would swell. In the last year, the clacking went to crunching. She went to see an ortho and they said she doesn't have arthritis. She was told her patella is out of place and was told she needs to wear a brace and to go to PT to get it back into place.   Patient Goals  Patient goals for therapy: increased strength, decreased pain, improved balance, increased motion and return to sport/leisure activities  Patient goal: \"to get rid of the pain, stop my knee from crunching, to get back to wearing heels.\"  Pain  Current pain ratin  At best pain rating: 3  At worst pain ratin  Location: medial aspect of R knee  Quality: throbbing and dull ache  Relieving factors: medications (lay down and elevate)  Aggravating factors: walking and stair climbing (bending, sitting liv cross)    Social " "Support  Stairs in house: yes (3 flights, bilateral hand rails)   Lives in: multiple-level home  Lives with: spouse (2 sons (26 and 22 year olds), 3 cats)    Employment status: working ([employer])  Hand dominance: right      Diagnostic Tests  X-ray: normal  Treatments  Previous treatment: medication        Objective     Active Range of Motion     Right Knee   Flexion: 68 degrees with pain  Extension: 0 degrees     Mobility   Patellar Mobility:     Right Knee   WFL: medial and lateral  Hypomobile: superior and inferior     Patellar Mobility Comments   Right inferior tendon comments: pain.     Strength/Myotome Testing     Left Hip   Planes of Motion   Flexion: 4+    Right Hip   Planes of Motion   Flexion: 4    Left Knee   Flexion: 4  Extension: 5    Right Knee   Flexion: 4-  Extension: 4-             Precautions: anxiety, asthma, DMII, hx TIA  Access Code: [email or web address]    POC expires Unit limit Auth Expiration date PT/OT/ST + Visit Limit?   12/22/24 BOMN 12/31/24 BOMN                           Visit/Unit Tracking  AUTH Status:  Date 10/24              Required after 24th v Used 1               Remaining  23                    Date 10/24            Re-Eval             FOTO             Manuals             Kinesiotape                                                    Neuro Re-Ed             Quad set                                                                                           Ther Ex             Bike             Quad stretch supine c strap 30\"x4            ITB stretch supine c strap 30\"x4            LAQ 2x10            Hip abduction             Hip extension             Hamstring curl                          Ther Activity                                       Gait Training                                       Modalities                                            "

## 2024-10-31 ENCOUNTER — OFFICE VISIT (OUTPATIENT)
Dept: PHYSICAL THERAPY | Facility: CLINIC | Age: 49
End: 2024-10-31
Payer: COMMERCIAL

## 2024-10-31 DIAGNOSIS — R29.898 WEAKNESS OF BOTH HIPS: ICD-10-CM

## 2024-10-31 DIAGNOSIS — M22.2X1 PATELLOFEMORAL SYNDROME OF RIGHT KNEE: Primary | ICD-10-CM

## 2024-10-31 PROCEDURE — 97110 THERAPEUTIC EXERCISES: CPT

## 2024-10-31 NOTE — PROGRESS NOTES
"Daily Note     Today's date: 10/31/2024  Patient name: Nancie Cordoba  : 1975  MRN: [id number]  Referring provider: Radha Sutton*  Dx:   Encounter Diagnosis     ICD-10-CM    1. Patellofemoral syndrome of right knee  M22.2X1       2. Weakness of both hips  R29.898           Start Time: 1552  Stop Time: 1630  Total time in clinic (min): 38 minutes    Subjective: Pt reports that she is getting looser.      Objective: See treatment diary below      Assessment: Pt is challenged with addition of weight for strengthening exercises. Pt responds favorably to kinesiotape, will monitor prolonged response and will trial Petty tape NV to compare responses. Will continue to benefit from skilled physical therapy.       Plan: Continue per plan of care.  Progress treatment as tolerated.       Precautions: anxiety, asthma, DMII, hx TIA  Access Code: [email or web address]    POC expires Unit limit Auth Expiration date PT/OT/ST + Visit Limit?   24 BOMN 24 BOMN                           Visit/Unit Tracking  AUTH Status:  Date 10/24 10/31             Required after  v Used 1 2              Remaining                     Date 10/24 10/31           Re-Eval             FOTO             Manuals             Kinesiotape  SY lateral to medial                                                   Neuro Re-Ed             Quad set             Hip add set   HL 5\"x20                                                                            Ther Ex             Bike  5'           Quad stretch supine c strap 30\"x4            ITB stretch supine c strap 30\"x4            LAQ 2x10 2# 4x10           Hip abduction  S/l 2# 2x10           Hip extension  Prone 2# 2x10           Hamstring curl  Prone 2# 2x10                        Ther Activity                                       Gait Training                                       Modalities                                            "

## 2024-11-05 ENCOUNTER — OFFICE VISIT (OUTPATIENT)
Dept: PHYSICAL THERAPY | Facility: CLINIC | Age: 49
End: 2024-11-05
Payer: COMMERCIAL

## 2024-11-05 DIAGNOSIS — R29.898 WEAKNESS OF BOTH HIPS: ICD-10-CM

## 2024-11-05 DIAGNOSIS — M22.2X1 PATELLOFEMORAL SYNDROME OF RIGHT KNEE: Primary | ICD-10-CM

## 2024-11-05 PROCEDURE — 97140 MANUAL THERAPY 1/> REGIONS: CPT

## 2024-11-05 PROCEDURE — 97110 THERAPEUTIC EXERCISES: CPT

## 2024-11-05 NOTE — PROGRESS NOTES
"Daily Note     Today's date: 2024  Patient name: Nancie Cordoba  : 1975  MRN: [id number]  Referring provider: Radha Sutton*  Dx:   Encounter Diagnosis     ICD-10-CM    1. Patellofemoral syndrome of right knee  M22.2X1       2. Weakness of both hips  R29.898                      Subjective: Pt reports her R knee was painful after last session.        Objective: See treatment diary below      Assessment: Patient with improvement in ability to ascend step with L-M Petty taping this session.  She demonstrates patellar tendon fatigue with addition of SLR today.  Will monitor response to taping NV.  Patient would benefit from continued PT.      Plan: Continue per plan of care.      Precautions: anxiety, asthma, DMII, hx TIA  Access Code: [email or web address]    POC expires Unit limit Auth Expiration date PT/OT/ST + Visit Limit?   24 BOMN 24 BOMN                           Visit/Unit Tracking  AUTH Status:  Date 10/24 10/31 11/5            Required after  v Used 1 2 3             Remaining  23 22 21                  Date 10/24 10/31 11/5          Re-Eval             FOTO             Manuals             Kinesiotape  SY lateral to medial  Petty tape AF                                                 Neuro Re-Ed             Quad set             Hip add set   HL 5\"x20                                                                            Ther Ex             Bike  5' 5'          Quad stretch supine c strap 30\"x4            ITB stretch supine c strap 30\"x4            LAQ 2x10 2# 4x10 2# 3x12 (90-45*)          Hip abduction  S/l 2# 2x10 S/l 2# 2x10          Hip extension  Prone 2# 2x10 Prone 2# 2x10          Hamstring curl  Prone 2# 2x10 Prone 2# 2x10          SLR   2x10          Ther Activity                                       Gait Training                                       Modalities                                              "

## 2024-11-21 ENCOUNTER — EVALUATION (OUTPATIENT)
Dept: PHYSICAL THERAPY | Facility: CLINIC | Age: 49
End: 2024-11-21
Payer: COMMERCIAL

## 2024-11-21 DIAGNOSIS — M22.2X1 PATELLOFEMORAL SYNDROME OF RIGHT KNEE: Primary | ICD-10-CM

## 2024-11-21 DIAGNOSIS — R29.898 WEAKNESS OF BOTH HIPS: ICD-10-CM

## 2024-11-21 PROCEDURE — 97110 THERAPEUTIC EXERCISES: CPT

## 2024-11-21 NOTE — PROGRESS NOTES
PT Discharge    Today's date: 2024  Patient name: Nancie Cordoba  : 1975  MRN: [id number]  Referring provider: Radha Sutton*  Dx:   Encounter Diagnosis     ICD-10-CM    1. Patellofemoral syndrome of right knee  M22.2X1       2. Weakness of both hips  R29.898             Start Time: 1415  Stop Time: 1500  Total time in clinic (min): 45 minutes    Assessment    Assessment details: Pt is a 49 y.o. female presenting to PT services with c/o R knee pain. Pt has been participating in PT for 4 weeks and has made significant improvement in regards to R knee mobility, RLE strength, decreased pain and improved functional ability. PT and pt have discussed and agreed that pt has met maximum benefit of skilled physical therapy and will continue to benefit from independent performance of HEP. Pt was informed that if she has any questions or concerns she is welcome to contact facility at any time. Pt is discharged from skilled physical therapy.     Goals  STG (3 weeks):  1. Pt will improve R knee flexion to be at least 90* GOAL MET  2. Pt will report pain at worst as 4/10 or less PARTIALLY MET  3. Pt will improve R knee extension strength to be 4+/5 GOAL MET    LTG (6 weeks):  1. Pt will be independent in HEP GOAL MET  2. Pt will improve R knee flexion AROM to be 115* GOAL MET  3. Pt will report pain at worst as 2/10 or less NOT MET  4. Pt will be able to negotiate stairs without increase in pain. GOAL MET    Plan  Patient would benefit from: home program    Planned therapy interventions: patient/caregiver education and home exercise program    Treatment plan discussed with: patient        Subjective Evaluation    History of Present Illness  Mechanism of injury: Pt reports that she is feeling about 50% better since beginning PT. She states that she was able to walk [place] the mall without getting too much pain. But she states that she is still getting some pain.   Patient Goals  Patient goals for  "therapy: increased strength, decreased pain, improved balance, increased motion and return to sport/leisure activities  Patient goal: \"to get rid of the pain, stop my knee from crunching, to get back to wearing heels.\"  Pain  Current pain ratin  At best pain ratin  At worst pain ratin  Location: medial aspect of R knee  Quality: dull ache  Alleviating factors: lay down and elevate.  Aggravating factors: stair climbing (prolonged walking)  Progression: improved    Social Support  Stairs in house: yes (3 flights, bilateral hand rails)   Lives in: multiple-level home  Lives with: spouse (2 sons (26 and 22 year olds), 3 cats)    Employment status: working ([employer])  Hand dominance: right      Diagnostic Tests  X-ray: normal  Treatments  Previous treatment: medication        Objective     Active Range of Motion     Right Knee   Flexion: 121 degrees   Extension: 0 degrees     Mobility   Patellar Mobility:     Right Knee   WFL: medial, lateral, superior and inferior    Strength/Myotome Testing     Left Hip   Planes of Motion   Flexion: 5    Right Hip   Planes of Motion   Flexion: 5    Left Knee   Flexion: 5  Extension: 5    Right Knee   Flexion: 5  Extension: 5             Precautions: anxiety, asthma, DMII, hx TIA  Access Code: [email or web address]    POC expires Unit limit Auth Expiration date PT/OT/ST + Visit Limit?   24 BOMN 24 BOMN                           Visit/Unit Tracking  AUTH Status:  Date 10/24 10/31 11/5 11/21           Required after  v Used 1 2 3 4            Remaining  23 22 21 20                 Date 10/24 10/31 11/5 11/21         Re-Eval    SY         FOTO             Manuals             Kinesiotape  SY lateral to medial  Petty tape AF                                                 Neuro Re-Ed             Quad set             Hip add set   HL 5\"x20                                                                            Ther Ex             Bike  5' 5' 5'         Quad stretch " "supine c strap 30\"x4            ITB stretch supine c strap 30\"x4            LAQ 2x10 2# 4x10 2# 3x12 (90-45*) GTB 3x12         Hip abduction  S/l 2# 2x10 S/l 2# 2x10 GTB 2x10         Hip extension  Prone 2# 2x10 Prone 2# 2x10 GTB 2x10         Hamstring curl  Prone 2# 2x10 Prone 2# 2x10 GTB 2x10         SLR   2x10 3x12         Ther Activity                                       Gait Training                                       Modalities                                              "

## 2025-02-05 DIAGNOSIS — G43.009 MIGRAINE WITHOUT AURA AND WITHOUT STATUS MIGRAINOSUS, NOT INTRACTABLE: ICD-10-CM

## 2025-02-06 RX ORDER — TOPIRAMATE 100 MG/1
100 TABLET, FILM COATED ORAL 2 TIMES DAILY
Qty: 180 TABLET | Refills: 1 | Status: SHIPPED | OUTPATIENT
Start: 2025-02-06

## 2025-02-28 DIAGNOSIS — E11.29 TYPE 2 DIABETES MELLITUS WITH MICROALBUMINURIA, WITHOUT LONG-TERM CURRENT USE OF INSULIN (HCC): Primary | ICD-10-CM

## 2025-02-28 DIAGNOSIS — R80.9 TYPE 2 DIABETES MELLITUS WITH MICROALBUMINURIA, WITHOUT LONG-TERM CURRENT USE OF INSULIN (HCC): Primary | ICD-10-CM

## 2025-03-04 ENCOUNTER — APPOINTMENT (OUTPATIENT)
Age: 50
End: 2025-03-04
Payer: COMMERCIAL

## 2025-03-04 DIAGNOSIS — R80.9 TYPE 2 DIABETES MELLITUS WITH MICROALBUMINURIA, WITHOUT LONG-TERM CURRENT USE OF INSULIN (HCC): ICD-10-CM

## 2025-03-04 DIAGNOSIS — E11.29 TYPE 2 DIABETES MELLITUS WITH MICROALBUMINURIA, WITHOUT LONG-TERM CURRENT USE OF INSULIN (HCC): ICD-10-CM

## 2025-03-04 LAB
ALBUMIN SERPL BCG-MCNC: 4.6 G/DL (ref 3.5–5)
ALP SERPL-CCNC: 119 U/L (ref 34–104)
ALT SERPL W P-5'-P-CCNC: 32 U/L (ref 7–52)
ANION GAP SERPL CALCULATED.3IONS-SCNC: 10 MMOL/L (ref 4–13)
AST SERPL W P-5'-P-CCNC: 20 U/L (ref 13–39)
BASOPHILS # BLD AUTO: 0.08 THOUSANDS/ÂΜL (ref 0–0.1)
BASOPHILS NFR BLD AUTO: 1 % (ref 0–1)
BILIRUB SERPL-MCNC: 0.61 MG/DL (ref 0.2–1)
BUN SERPL-MCNC: 16 MG/DL (ref 5–25)
CALCIUM SERPL-MCNC: 10.2 MG/DL (ref 8.4–10.2)
CHLORIDE SERPL-SCNC: 107 MMOL/L (ref 96–108)
CHOLEST SERPL-MCNC: 117 MG/DL (ref ?–200)
CO2 SERPL-SCNC: 25 MMOL/L (ref 21–32)
CREAT SERPL-MCNC: 0.81 MG/DL (ref 0.6–1.3)
CREAT UR-MCNC: 252.4 MG/DL
EOSINOPHIL # BLD AUTO: 0.34 THOUSAND/ÂΜL (ref 0–0.61)
EOSINOPHIL NFR BLD AUTO: 5 % (ref 0–6)
ERYTHROCYTE [DISTWIDTH] IN BLOOD BY AUTOMATED COUNT: 12.9 % (ref 11.6–15.1)
EST. AVERAGE GLUCOSE BLD GHB EST-MCNC: 126 MG/DL
GFR SERPL CREATININE-BSD FRML MDRD: 86 ML/MIN/1.73SQ M
GLUCOSE P FAST SERPL-MCNC: 110 MG/DL (ref 65–99)
HBA1C MFR BLD: 6 %
HCT VFR BLD AUTO: 44.1 % (ref 34.8–46.1)
HDLC SERPL-MCNC: 40 MG/DL
HGB BLD-MCNC: 14.5 G/DL (ref 11.5–15.4)
IMM GRANULOCYTES # BLD AUTO: 0.02 THOUSAND/UL (ref 0–0.2)
IMM GRANULOCYTES NFR BLD AUTO: 0 % (ref 0–2)
LDLC SERPL CALC-MCNC: 59 MG/DL (ref 0–100)
LYMPHOCYTES # BLD AUTO: 2.13 THOUSANDS/ÂΜL (ref 0.6–4.47)
LYMPHOCYTES NFR BLD AUTO: 29 % (ref 14–44)
MCH RBC QN AUTO: 31.6 PG (ref 26.8–34.3)
MCHC RBC AUTO-ENTMCNC: 32.9 G/DL (ref 31.4–37.4)
MCV RBC AUTO: 96 FL (ref 82–98)
MICROALBUMIN UR-MCNC: 22.4 MG/L
MICROALBUMIN/CREAT 24H UR: 9 MG/G CREATININE (ref 0–30)
MONOCYTES # BLD AUTO: 0.46 THOUSAND/ÂΜL (ref 0.17–1.22)
MONOCYTES NFR BLD AUTO: 6 % (ref 4–12)
NEUTROPHILS # BLD AUTO: 4.39 THOUSANDS/ÂΜL (ref 1.85–7.62)
NEUTS SEG NFR BLD AUTO: 59 % (ref 43–75)
NRBC BLD AUTO-RTO: 0 /100 WBCS
PLATELET # BLD AUTO: 303 THOUSANDS/UL (ref 149–390)
PMV BLD AUTO: 11.4 FL (ref 8.9–12.7)
POTASSIUM SERPL-SCNC: 3.9 MMOL/L (ref 3.5–5.3)
PROT SERPL-MCNC: 7.7 G/DL (ref 6.4–8.4)
RBC # BLD AUTO: 4.59 MILLION/UL (ref 3.81–5.12)
SODIUM SERPL-SCNC: 142 MMOL/L (ref 135–147)
TRIGL SERPL-MCNC: 92 MG/DL (ref ?–150)
WBC # BLD AUTO: 7.42 THOUSAND/UL (ref 4.31–10.16)

## 2025-03-04 PROCEDURE — 85025 COMPLETE CBC W/AUTO DIFF WBC: CPT

## 2025-03-04 PROCEDURE — 80053 COMPREHEN METABOLIC PANEL: CPT

## 2025-03-04 PROCEDURE — 82043 UR ALBUMIN QUANTITATIVE: CPT

## 2025-03-04 PROCEDURE — 83036 HEMOGLOBIN GLYCOSYLATED A1C: CPT

## 2025-03-04 PROCEDURE — 80061 LIPID PANEL: CPT

## 2025-03-04 PROCEDURE — 36415 COLL VENOUS BLD VENIPUNCTURE: CPT

## 2025-03-04 PROCEDURE — 82570 ASSAY OF URINE CREATININE: CPT

## 2025-03-05 ENCOUNTER — RESULTS FOLLOW-UP (OUTPATIENT)
Dept: INTERNAL MEDICINE CLINIC | Facility: CLINIC | Age: 50
End: 2025-03-05

## 2025-03-06 ENCOUNTER — TELEPHONE (OUTPATIENT)
Dept: ADMINISTRATIVE | Facility: OTHER | Age: 50
End: 2025-03-06

## 2025-03-06 ENCOUNTER — OFFICE VISIT (OUTPATIENT)
Dept: INTERNAL MEDICINE CLINIC | Facility: CLINIC | Age: 50
End: 2025-03-06
Payer: COMMERCIAL

## 2025-03-06 VITALS
HEIGHT: 63 IN | RESPIRATION RATE: 20 BRPM | DIASTOLIC BLOOD PRESSURE: 72 MMHG | HEART RATE: 99 BPM | OXYGEN SATURATION: 96 % | BODY MASS INDEX: 30.89 KG/M2 | SYSTOLIC BLOOD PRESSURE: 104 MMHG

## 2025-03-06 DIAGNOSIS — R20.0 NUMBNESS AND TINGLING IN BOTH HANDS: ICD-10-CM

## 2025-03-06 DIAGNOSIS — R06.09 DOE (DYSPNEA ON EXERTION): ICD-10-CM

## 2025-03-06 DIAGNOSIS — E78.5 HYPERLIPIDEMIA, UNSPECIFIED HYPERLIPIDEMIA TYPE: ICD-10-CM

## 2025-03-06 DIAGNOSIS — R20.2 NUMBNESS AND TINGLING IN BOTH HANDS: ICD-10-CM

## 2025-03-06 DIAGNOSIS — N32.81 OVERACTIVE BLADDER: ICD-10-CM

## 2025-03-06 DIAGNOSIS — R20.2 PARESTHESIAS: ICD-10-CM

## 2025-03-06 DIAGNOSIS — E11.21 TYPE 2 DIABETES MELLITUS WITH DIABETIC NEPHROPATHY, WITHOUT LONG-TERM CURRENT USE OF INSULIN (HCC): Primary | ICD-10-CM

## 2025-03-06 DIAGNOSIS — R07.89 CHEST PRESSURE: ICD-10-CM

## 2025-03-06 PROCEDURE — 99214 OFFICE O/P EST MOD 30 MIN: CPT | Performed by: INTERNAL MEDICINE

## 2025-03-06 RX ORDER — SEMAGLUTIDE 1.34 MG/ML
1 INJECTION, SOLUTION SUBCUTANEOUS WEEKLY
COMMUNITY
Start: 2025-01-24 | End: 2025-03-06

## 2025-03-06 RX ORDER — SEMAGLUTIDE 1.34 MG/ML
1 INJECTION, SOLUTION SUBCUTANEOUS WEEKLY
COMMUNITY
Start: 2025-02-21

## 2025-03-06 NOTE — TELEPHONE ENCOUNTER
----- Message from Candi DOUGLAS sent at 3/6/2025 10:43 AM EST -----  03/06/25 10:43 AM    Hello, our patient Nancie Cordoba has had Diabetic Eye Exam completed/performed. Please assist in updating the patient chart by making an External outreach to [facility] facility located in [place]. Phone number [phone]. The date of service is 5/2024.    Thank you,  Candi Atkins LPN  [facility] [place]

## 2025-03-06 NOTE — TELEPHONE ENCOUNTER
Upon review of the In Basket request and the patient's chart, initial outreach has been made via fax to facility. Please see Contacts section for details.     Thank you  Josr Ríos MA

## 2025-03-06 NOTE — ASSESSMENT & PLAN NOTE
Following with endocrine. Eye exam and foot exam up to date.  Lab Results   Component Value Date    HGBA1C 6.0 (H) 03/04/2025

## 2025-03-06 NOTE — PROGRESS NOTES
Name: Nancie Cordoba      : 1975      MRN: [id number]  Encounter Provider: Heide Mcdonald MD  Encounter Date: 3/6/2025   Encounter department: [facility] [place]  :  Assessment & Plan  Type 2 diabetes mellitus with diabetic nephropathy, without long-term current use of insulin (HCC)  Following with endocrine. Eye exam and foot exam up to date.  Lab Results   Component Value Date    HGBA1C 6.0 (H) 2025            Overactive bladder  Cystocele, midline   S/p anterior colporrhaphy by VN in 2018         Hyperlipidemia, unspecified hyperlipidemia type  Continue statin. LDL not at goal.           Chest pressure  Chest pressure, reports happens at rest, denies radiation, associated sob, denies n/v/d/c, denies food association, reports she does get winded going up the stairs.  Orders:    Stress test only, exercise; Future    PHAM (dyspnea on exertion)  Chest pressure, reports happens at rest, denies radiation, associated sob, denies n/v/d/c, denies food association, reports she does get winded going up the stairs.  Orders:    Stress test only, exercise; Future    Numbness and tingling in both hands  H/o neuropathy in b/l feet, 2/2 DM    She reports numbness and tingling to both hands, states that she has a in her neck which radiates down.   Orders:    EMG; Future    Paresthesias    Orders:    EMG; Future          Depression Screening and Follow-up Plan: Patient was screened for depression during today's encounter. They screened negative with a PHQ-2 score of 0.        History of Present Illness   HPI  Review of Systems   Constitutional:  Negative for chills and fever.   HENT:  Negative for ear pain and sore throat.    Eyes:  Negative for pain and visual disturbance.   Respiratory:  Negative for cough and shortness of breath.    Cardiovascular:  Negative for chest pain and palpitations.   Gastrointestinal:  Negative for abdominal pain and vomiting.   Genitourinary:   "Negative for dysuria and hematuria.   Musculoskeletal:  Negative for arthralgias and back pain.   Skin:  Negative for color change and rash.   Neurological:  Negative for seizures and syncope.   All other systems reviewed and are negative.      Objective   /72 (BP Location: Left arm, Patient Position: Sitting, Cuff Size: Adult)   Pulse 99   Resp 20   Ht 5' 3\" (1.6 m)   SpO2 96%   BMI 30.89 kg/m²      Physical Exam  Vitals and nursing note reviewed.   Constitutional:       General: She is not in acute distress.     Appearance: She is well-developed.   HENT:      Head: Normocephalic and atraumatic.   Cardiovascular:      Rate and Rhythm: Normal rate and regular rhythm.      Heart sounds: No murmur heard.  Pulmonary:      Effort: Pulmonary effort is normal. No respiratory distress.      Breath sounds: Normal breath sounds.   Abdominal:      Palpations: Abdomen is soft.      Tenderness: There is no abdominal tenderness.   Musculoskeletal:         General: No swelling.      Cervical back: Neck supple.   Skin:     General: Skin is warm and dry.      Capillary Refill: Capillary refill takes less than 2 seconds.   Neurological:      Mental Status: She is alert.   Psychiatric:         Mood and Affect: Mood normal.         "

## 2025-03-06 NOTE — ASSESSMENT & PLAN NOTE
H/o neuropathy in b/l feet, 2/2 DM    She reports numbness and tingling to both hands, states that she has a in her neck which radiates down.   Orders:    EMG; Future

## 2025-03-06 NOTE — LETTER
Diabetic Eye Exam Form    Date Requested: 25  Patient: Nancie Cordoba    Please complete form  Patient : 1975   Referring Provider: Heide Mcdonald MD      DIABETIC Eye Exam Date _______________________________      Type of Exam MUST be documented for Diabetic Eye Exams. Please CHECK ONE.     Retinal Exam       Dilated Retinal Exam       OCT       Optomap-Iris Exam      Fundus Photography       Left Eye - Please check Retinopathy or No Retinopathy        Exam did show retinopathy    Exam did not show retinopathy       Right Eye - Please check Retinopathy or No Retinopathy       Exam did show retinopathy    Exam did not show retinopathy       Comments __________________________________________________________    Practice Providing Exam ______________________________________________    Exam Performed By (print name) _______________________________________      Provider Signature ___________________________________________________      These reports are needed for  compliance.  Please fax this completed form and a copy of the Diabetic Eye Exam report to our office located at [address] [place] [address] as soon as possible via Fax [phone] attention Josr: Phone [phone]  We thank you for your assistance in treating our mutual patient.

## 2025-03-06 NOTE — PROGRESS NOTES
Patient's shoes and socks removed.    Right Foot/Ankle   Right Foot Inspection  Skin Exam: skin normal, skin intact and dry skin. No warmth, no callus, no erythema, no maceration, no abnormal color, no pre-ulcer, no ulcer and no callus.     Toe Exam: ROM and strength within normal limits.     Sensory   Monofilament testing: intact    Vascular  The right DP pulse is 2+.     Right Toe  - Comprehensive Exam  Ecchymosis: none  Swelling: none       Left Foot/Ankle  Left Foot Inspection  Skin Exam: skin normal, skin intact and dry skin. No warmth, no erythema, no maceration, normal color, no pre-ulcer, no ulcer and no callus.     Toe Exam: ROM and strength within normal limits.     Sensory   Monofilament testing: intact    Vascular  The left DP pulse is 2+.     Left Toe  - Comprehensive Exam  Ecchymosis: none  Swelling: none       Assign Risk Category  No deformity present  No loss of protective sensation  No weak pulses  Risk: 0

## 2025-03-10 NOTE — TELEPHONE ENCOUNTER
Upon review of the In Basket request we  found as per [facility], the most recent DM eye exam is on file.  2023    Any additional questions or concerns should be emailed to the Practice Liaisons via the appropriate education email address, please do not reply via In Basket.    Thank you  Josr Ríos MA   [facility]

## 2025-05-12 ENCOUNTER — PROCEDURE VISIT (OUTPATIENT)
Dept: NEUROLOGY | Facility: CLINIC | Age: 50
End: 2025-05-12
Payer: COMMERCIAL

## 2025-05-12 ENCOUNTER — RESULTS FOLLOW-UP (OUTPATIENT)
Dept: INTERNAL MEDICINE CLINIC | Facility: CLINIC | Age: 50
End: 2025-05-12

## 2025-05-12 DIAGNOSIS — R20.2 PARESTHESIAS: ICD-10-CM

## 2025-05-12 DIAGNOSIS — R20.2 NUMBNESS AND TINGLING IN BOTH HANDS: ICD-10-CM

## 2025-05-12 DIAGNOSIS — R20.0 NUMBNESS AND TINGLING IN BOTH HANDS: ICD-10-CM

## 2025-05-12 PROCEDURE — 95912 NRV CNDJ TEST 11-12 STUDIES: CPT | Performed by: PHYSICAL MEDICINE & REHABILITATION

## 2025-05-12 PROCEDURE — 95886 MUSC TEST DONE W/N TEST COMP: CPT | Performed by: PHYSICAL MEDICINE & REHABILITATION

## 2025-05-28 DIAGNOSIS — G62.9 PERIPHERAL POLYNEUROPATHY: ICD-10-CM

## 2025-05-28 DIAGNOSIS — R80.9 TYPE 2 DIABETES MELLITUS WITH MICROALBUMINURIA, WITHOUT LONG-TERM CURRENT USE OF INSULIN (HCC): ICD-10-CM

## 2025-05-28 DIAGNOSIS — E11.29 TYPE 2 DIABETES MELLITUS WITH MICROALBUMINURIA, WITHOUT LONG-TERM CURRENT USE OF INSULIN (HCC): ICD-10-CM

## 2025-05-28 DIAGNOSIS — E78.5 HYPERLIPIDEMIA, UNSPECIFIED HYPERLIPIDEMIA TYPE: ICD-10-CM

## 2025-05-28 DIAGNOSIS — I10 BENIGN ESSENTIAL HYPERTENSION: ICD-10-CM

## 2025-05-28 DIAGNOSIS — J45.909 UNCOMPLICATED ASTHMA, UNSPECIFIED ASTHMA SEVERITY, UNSPECIFIED WHETHER PERSISTENT: ICD-10-CM

## 2025-05-28 RX ORDER — MONTELUKAST SODIUM 10 MG/1
10 TABLET ORAL
Qty: 90 TABLET | Refills: 1 | Status: SHIPPED | OUTPATIENT
Start: 2025-05-28

## 2025-05-28 RX ORDER — GABAPENTIN 100 MG/1
400 CAPSULE ORAL
Qty: 360 CAPSULE | Refills: 1 | Status: SHIPPED | OUTPATIENT
Start: 2025-05-28

## 2025-05-28 RX ORDER — ATORVASTATIN CALCIUM 20 MG/1
20 TABLET, FILM COATED ORAL
Qty: 90 TABLET | Refills: 1 | Status: SHIPPED | OUTPATIENT
Start: 2025-05-28

## 2025-05-28 NOTE — TELEPHONE ENCOUNTER
Reason for call:   [x] Refill   [] Prior Auth  [] Other:     Office:   [x] PCP/Provider - Heide Mcdonald MD   [] Specialty/Provider -     Medication:   gabapentin (NEURONTIN) 100 mg capsule       montelukast (SINGULAIR) 10 mg tablet   atorvastatin (LIPITOR) 20 mg tablet   Dose/Frequency:     400 mg, Oral, Daily at bedtime         10 mg, Oral, Daily at bedtime   20 mg, Oral, Daily at bedtime       Quantity:   360  90  90    Pharmacy: [facility]pharm[id number] - [place] - [address] [facility]   Does the patient have enough for 3 days?   [] Yes   [x] No - Send as HP to POD    Mail Away Pharmacy   Does the patient have enough for 10 days?   [] Yes   [] No - Send as HP to POD

## 2025-07-14 ENCOUNTER — VBI (OUTPATIENT)
Dept: ADMINISTRATIVE | Facility: OTHER | Age: 50
End: 2025-07-14

## 2025-07-14 NOTE — TELEPHONE ENCOUNTER
07/14/25 1:40 PM     Chart reviewed for Diabetic Eye Exam ; nothing is submitted to the patient's insurance at this time.     Giulia Casiano   [facility]

## 2025-08-04 DIAGNOSIS — N32.81 OAB (OVERACTIVE BLADDER): ICD-10-CM

## 2025-08-04 RX ORDER — OXYBUTYNIN CHLORIDE 5 MG/1
5 TABLET ORAL
Qty: 90 TABLET | Refills: 3 | Status: SHIPPED | OUTPATIENT
Start: 2025-08-04 | End: 2025-11-02

## (undated) DEVICE — GAUZE SPONGES,8 PLY: Brand: CURITY

## (undated) DEVICE — LIGHT HANDLE COVER SLEEVE DISP BLUE STELLAR

## (undated) DEVICE — GLOVE SRG BIOGEL 7

## (undated) DEVICE — PACK UNIVERSAL NECK

## (undated) DEVICE — CHLORAPREP HI-LITE 10.5ML ORANGE

## (undated) DEVICE — REM POLYHESIVE ADULT PATIENT RETURN ELECTRODE: Brand: VALLEYLAB

## (undated) DEVICE — 3M™ TEGADERM™ TRANSPARENT FILM DRESSING FRAME STYLE, 1624W, 2-3/8 IN X 2-3/4 IN (6 CM X 7 CM), 100/CT 4CT/CASE: Brand: 3M™ TEGADERM™

## (undated) DEVICE — TUBING SUCTION 5MM X 12 FT

## (undated) DEVICE — NEEDLE 25G X 1 1/2

## (undated) DEVICE — CATH URETERAL 5FR X 70 CM FLEX TIP POLYUR BARD

## (undated) DEVICE — GUIDEWIRE STRGHT TIP 0.035 IN  SOLO PLUS

## (undated) DEVICE — SPECIMEN CONTAINER STERILE PEEL PACK

## (undated) DEVICE — LIGHT HANDLE COVER CAMERA DISP

## (undated) DEVICE — BASIC SINGLE BASIN 2-LF: Brand: MEDLINE INDUSTRIES, INC.

## (undated) DEVICE — DRAPE EQUIPMENT RF WAND

## (undated) DEVICE — CHLORHEXIDINE 4PCT 4 OZ

## (undated) DEVICE — SCD SEQUENTIAL COMPRESSION COMFORT SLEEVE MEDIUM KNEE LENGTH: Brand: KENDALL SCD

## (undated) DEVICE — Device: Brand: OLYMPUS

## (undated) DEVICE — X-RAY DETECTABLE SPONGES,16 PLY: Brand: VISTEC

## (undated) DEVICE — 3M™ STERI-STRIP™ REINFORCED ADHESIVE SKIN CLOSURES, R1546, 1/4 IN X 4 IN (6 MM X 100 MM), 10 STRIPS/ENVELOPE: Brand: 3M™ STERI-STRIP™

## (undated) DEVICE — MINOR PROCEDURE DRAPE: Brand: CONVERTORS

## (undated) DEVICE — PAD GROUNDING ADULT

## (undated) DEVICE — POOLE SUCTION HANDLE: Brand: CARDINAL HEALTH

## (undated) DEVICE — INTENDED FOR TISSUE SEPARATION, AND OTHER PROCEDURES THAT REQUIRE A SHARP SURGICAL BLADE TO PUNCTURE OR CUT.: Brand: BARD-PARKER SAFETY BLADES SIZE 15, STERILE

## (undated) DEVICE — PACK TUR

## (undated) DEVICE — INVIEW CLEAR LEGGINGS: Brand: CONVERTORS